# Patient Record
Sex: FEMALE | Race: BLACK OR AFRICAN AMERICAN | URBAN - METROPOLITAN AREA
[De-identification: names, ages, dates, MRNs, and addresses within clinical notes are randomized per-mention and may not be internally consistent; named-entity substitution may affect disease eponyms.]

---

## 2017-08-22 ENCOUNTER — ED HISTORICAL/CONVERTED ENCOUNTER (OUTPATIENT)
Dept: OTHER | Age: 57
End: 2017-08-22

## 2018-02-01 ENCOUNTER — ED HISTORICAL/CONVERTED ENCOUNTER (OUTPATIENT)
Dept: OTHER | Age: 58
End: 2018-02-01

## 2023-02-13 ENCOUNTER — APPOINTMENT (OUTPATIENT)
Dept: GENERAL RADIOLOGY | Age: 63
DRG: 137 | End: 2023-02-13
Attending: EMERGENCY MEDICINE
Payer: MEDICAID

## 2023-02-13 ENCOUNTER — APPOINTMENT (OUTPATIENT)
Dept: CT IMAGING | Age: 63
DRG: 137 | End: 2023-02-13
Attending: EMERGENCY MEDICINE
Payer: MEDICAID

## 2023-02-13 ENCOUNTER — HOSPITAL ENCOUNTER (INPATIENT)
Age: 63
LOS: 4 days | Discharge: HOME | DRG: 137 | End: 2023-02-17
Attending: EMERGENCY MEDICINE | Admitting: FAMILY MEDICINE
Payer: MEDICAID

## 2023-02-13 DIAGNOSIS — J96.01 ACUTE RESPIRATORY FAILURE WITH HYPOXIA (HCC): Primary | ICD-10-CM

## 2023-02-13 PROBLEM — J18.9 PNEUMONIA: Status: ACTIVE | Noted: 2023-02-13

## 2023-02-13 PROBLEM — J96.91 RESPIRATORY FAILURE WITH HYPOXIA (HCC): Status: ACTIVE | Noted: 2023-02-13

## 2023-02-13 LAB
ALBUMIN SERPL-MCNC: 3.3 G/DL (ref 3.5–5)
ALBUMIN/GLOB SERPL: 0.7 (ref 1.1–2.2)
ALP SERPL-CCNC: 68 U/L (ref 45–117)
ALT SERPL-CCNC: 38 U/L (ref 12–78)
ANION GAP SERPL CALC-SCNC: 9 MMOL/L (ref 5–15)
ARTERIAL PATENCY WRIST A: YES
ARTERIAL PATENCY WRIST A: YES
AST SERPL W P-5'-P-CCNC: 33 U/L (ref 15–37)
BASE EXCESS BLDA CALC-SCNC: 3.5 MMOL/L (ref 0–3)
BASE EXCESS BLDA CALC-SCNC: 4.9 MMOL/L (ref 0–3)
BASOPHILS # BLD: 0 K/UL (ref 0–0.1)
BASOPHILS NFR BLD: 1 % (ref 0–1)
BDY SITE: ABNORMAL
BDY SITE: ABNORMAL
BILIRUB SERPL-MCNC: 0.6 MG/DL (ref 0.2–1)
BODY TEMPERATURE: 98.3
BODY TEMPERATURE: 98.6
BUN SERPL-MCNC: 14 MG/DL (ref 6–20)
BUN/CREAT SERPL: 14 (ref 12–20)
CA-I BLD-MCNC: 9.2 MG/DL (ref 8.5–10.1)
CHLORIDE SERPL-SCNC: 89 MMOL/L (ref 97–108)
CO2 SERPL-SCNC: 28 MMOL/L (ref 21–32)
CREAT SERPL-MCNC: 0.97 MG/DL (ref 0.55–1.02)
DIFFERENTIAL METHOD BLD: ABNORMAL
EOSINOPHIL # BLD: 0 K/UL (ref 0–0.4)
EOSINOPHIL NFR BLD: 0 % (ref 0–7)
ERYTHROCYTE [DISTWIDTH] IN BLOOD BY AUTOMATED COUNT: 13.6 % (ref 11.5–14.5)
FIO2 ON VENT: 36 %
FLUAV AG NPH QL IA: NEGATIVE
FLUBV AG NOSE QL IA: NEGATIVE
GAS FLOW.O2 O2 DELIVERY SYS: 4 L/MIN
GAS FLOW.O2 O2 DELIVERY SYS: 4 L/MIN
GLOBULIN SER CALC-MCNC: 4.9 G/DL (ref 2–4)
GLUCOSE BLD STRIP.AUTO-MCNC: 195 MG/DL (ref 65–100)
GLUCOSE SERPL-MCNC: 209 MG/DL (ref 65–100)
HCO3 BLDA-SCNC: 27 MMOL/L (ref 22–26)
HCO3 BLDA-SCNC: 30 MMOL/L (ref 22–26)
HCT VFR BLD AUTO: 37.6 % (ref 35–47)
HGB BLD-MCNC: 12.5 G/DL (ref 11.5–16)
IMM GRANULOCYTES # BLD AUTO: 0 K/UL (ref 0–0.04)
IMM GRANULOCYTES NFR BLD AUTO: 1 % (ref 0–0.5)
LACTATE SERPL-SCNC: 1.2 MMOL/L (ref 0.4–2)
LYMPHOCYTES # BLD: 0.5 K/UL (ref 0.8–3.5)
LYMPHOCYTES NFR BLD: 18 % (ref 12–49)
MCH RBC QN AUTO: 28.8 PG (ref 26–34)
MCHC RBC AUTO-ENTMCNC: 33.2 G/DL (ref 30–36.5)
MCV RBC AUTO: 86.6 FL (ref 80–99)
MONOCYTES # BLD: 0.1 K/UL (ref 0–1)
MONOCYTES NFR BLD: 3 % (ref 5–13)
NEUTS SEG # BLD: 2.1 K/UL (ref 1.8–8)
NEUTS SEG NFR BLD: 77 % (ref 32–75)
NRBC # BLD: 0 K/UL (ref 0–0.01)
NRBC BLD-RTO: 0 PER 100 WBC
PCO2 BLDA: 39 MMHG (ref 35–45)
PCO2 BLDA: 46 MMHG (ref 35–45)
PERFORMED BY, TECHID: ABNORMAL
PH BLDA: 7.43 (ref 7.35–7.45)
PH BLDA: 7.47 (ref 7.35–7.45)
PLATELET # BLD AUTO: 196 K/UL (ref 150–400)
PMV BLD AUTO: 10.4 FL (ref 8.9–12.9)
PO2 BLDA: 54 MMHG (ref 80–100)
PO2 BLDA: 70 MMHG (ref 80–100)
POTASSIUM SERPL-SCNC: 3.4 MMOL/L (ref 3.5–5.1)
PROT SERPL-MCNC: 8.2 G/DL (ref 6.4–8.2)
RBC # BLD AUTO: 4.34 M/UL (ref 3.8–5.2)
SAO2% DEVICE SAO2% SENSOR NAME: ABNORMAL
SAO2% DEVICE SAO2% SENSOR NAME: ABNORMAL
SARS-COV-2 RDRP RESP QL NAA+PROBE: NOT DETECTED
SODIUM SERPL-SCNC: 126 MMOL/L (ref 136–145)
SPECIMEN SITE: ABNORMAL
SPECIMEN SITE: ABNORMAL
WBC # BLD AUTO: 2.6 K/UL (ref 3.6–11)

## 2023-02-13 PROCEDURE — 74011250636 HC RX REV CODE- 250/636: Performed by: FAMILY MEDICINE

## 2023-02-13 PROCEDURE — 80053 COMPREHEN METABOLIC PANEL: CPT

## 2023-02-13 PROCEDURE — 74011000258 HC RX REV CODE- 258: Performed by: FAMILY MEDICINE

## 2023-02-13 PROCEDURE — 83605 ASSAY OF LACTIC ACID: CPT

## 2023-02-13 PROCEDURE — 74011250636 HC RX REV CODE- 250/636: Performed by: EMERGENCY MEDICINE

## 2023-02-13 PROCEDURE — 74011250637 HC RX REV CODE- 250/637: Performed by: EMERGENCY MEDICINE

## 2023-02-13 PROCEDURE — 71275 CT ANGIOGRAPHY CHEST: CPT

## 2023-02-13 PROCEDURE — 74011250637 HC RX REV CODE- 250/637: Performed by: FAMILY MEDICINE

## 2023-02-13 PROCEDURE — 74011000636 HC RX REV CODE- 636: Performed by: FAMILY MEDICINE

## 2023-02-13 PROCEDURE — 87635 SARS-COV-2 COVID-19 AMP PRB: CPT

## 2023-02-13 PROCEDURE — 82962 GLUCOSE BLOOD TEST: CPT

## 2023-02-13 PROCEDURE — 99285 EMERGENCY DEPT VISIT HI MDM: CPT

## 2023-02-13 PROCEDURE — 36600 WITHDRAWAL OF ARTERIAL BLOOD: CPT

## 2023-02-13 PROCEDURE — 85025 COMPLETE CBC W/AUTO DIFF WBC: CPT

## 2023-02-13 PROCEDURE — 36415 COLL VENOUS BLD VENIPUNCTURE: CPT

## 2023-02-13 PROCEDURE — 71045 X-RAY EXAM CHEST 1 VIEW: CPT

## 2023-02-13 PROCEDURE — 96374 THER/PROPH/DIAG INJ IV PUSH: CPT

## 2023-02-13 PROCEDURE — 87804 INFLUENZA ASSAY W/OPTIC: CPT

## 2023-02-13 PROCEDURE — 87040 BLOOD CULTURE FOR BACTERIA: CPT

## 2023-02-13 PROCEDURE — 81003 URINALYSIS AUTO W/O SCOPE: CPT

## 2023-02-13 PROCEDURE — 82803 BLOOD GASES ANY COMBINATION: CPT

## 2023-02-13 PROCEDURE — 74011000250 HC RX REV CODE- 250: Performed by: EMERGENCY MEDICINE

## 2023-02-13 PROCEDURE — 83036 HEMOGLOBIN GLYCOSYLATED A1C: CPT

## 2023-02-13 PROCEDURE — 65270000029 HC RM PRIVATE

## 2023-02-13 RX ORDER — INSULIN LISPRO 100 [IU]/ML
INJECTION, SOLUTION INTRAVENOUS; SUBCUTANEOUS
Status: DISCONTINUED | OUTPATIENT
Start: 2023-02-13 | End: 2023-02-17 | Stop reason: HOSPADM

## 2023-02-13 RX ORDER — ENOXAPARIN SODIUM 100 MG/ML
40 INJECTION SUBCUTANEOUS DAILY
Status: DISCONTINUED | OUTPATIENT
Start: 2023-02-14 | End: 2023-02-17 | Stop reason: HOSPADM

## 2023-02-13 RX ORDER — POTASSIUM CHLORIDE 20 MEQ/1
40 TABLET, EXTENDED RELEASE ORAL
Status: COMPLETED | OUTPATIENT
Start: 2023-02-13 | End: 2023-02-13

## 2023-02-13 RX ORDER — LEVOFLOXACIN 5 MG/ML
750 INJECTION, SOLUTION INTRAVENOUS EVERY 24 HOURS
Status: DISCONTINUED | OUTPATIENT
Start: 2023-02-13 | End: 2023-02-17 | Stop reason: HOSPADM

## 2023-02-13 RX ORDER — ONDANSETRON 4 MG/1
4 TABLET, ORALLY DISINTEGRATING ORAL
Status: DISCONTINUED | OUTPATIENT
Start: 2023-02-13 | End: 2023-02-17 | Stop reason: HOSPADM

## 2023-02-13 RX ORDER — SODIUM CHLORIDE 0.9 % (FLUSH) 0.9 %
5-10 SYRINGE (ML) INJECTION AS NEEDED
Status: DISCONTINUED | OUTPATIENT
Start: 2023-02-13 | End: 2023-02-17 | Stop reason: HOSPADM

## 2023-02-13 RX ORDER — ACETAMINOPHEN 500 MG
1000 TABLET ORAL
Status: COMPLETED | OUTPATIENT
Start: 2023-02-13 | End: 2023-02-13

## 2023-02-13 RX ORDER — POLYETHYLENE GLYCOL 3350 17 G/17G
17 POWDER, FOR SOLUTION ORAL DAILY PRN
Status: DISCONTINUED | OUTPATIENT
Start: 2023-02-13 | End: 2023-02-17 | Stop reason: HOSPADM

## 2023-02-13 RX ORDER — IPRATROPIUM BROMIDE AND ALBUTEROL SULFATE 2.5; .5 MG/3ML; MG/3ML
3 SOLUTION RESPIRATORY (INHALATION)
Status: COMPLETED | OUTPATIENT
Start: 2023-02-13 | End: 2023-02-13

## 2023-02-13 RX ORDER — IBUPROFEN 200 MG
4 TABLET ORAL AS NEEDED
Status: DISCONTINUED | OUTPATIENT
Start: 2023-02-13 | End: 2023-02-17 | Stop reason: HOSPADM

## 2023-02-13 RX ORDER — ACETAMINOPHEN 650 MG/1
650 SUPPOSITORY RECTAL
Status: DISCONTINUED | OUTPATIENT
Start: 2023-02-13 | End: 2023-02-17 | Stop reason: HOSPADM

## 2023-02-13 RX ORDER — BUDESONIDE AND FORMOTEROL FUMARATE DIHYDRATE 160; 4.5 UG/1; UG/1
2 AEROSOL RESPIRATORY (INHALATION) 2 TIMES DAILY
Status: DISCONTINUED | OUTPATIENT
Start: 2023-02-13 | End: 2023-02-17 | Stop reason: HOSPADM

## 2023-02-13 RX ORDER — ACETAMINOPHEN 325 MG/1
650 TABLET ORAL
Status: DISCONTINUED | OUTPATIENT
Start: 2023-02-13 | End: 2023-02-17 | Stop reason: HOSPADM

## 2023-02-13 RX ORDER — ONDANSETRON 2 MG/ML
4 INJECTION INTRAMUSCULAR; INTRAVENOUS
Status: DISCONTINUED | OUTPATIENT
Start: 2023-02-13 | End: 2023-02-17 | Stop reason: HOSPADM

## 2023-02-13 RX ORDER — IPRATROPIUM BROMIDE AND ALBUTEROL SULFATE 2.5; .5 MG/3ML; MG/3ML
3 SOLUTION RESPIRATORY (INHALATION)
Status: DISCONTINUED | OUTPATIENT
Start: 2023-02-13 | End: 2023-02-17

## 2023-02-13 RX ORDER — SODIUM CHLORIDE 9 MG/ML
75 INJECTION, SOLUTION INTRAVENOUS CONTINUOUS
Status: DISCONTINUED | OUTPATIENT
Start: 2023-02-13 | End: 2023-02-16

## 2023-02-13 RX ADMIN — LEVOFLOXACIN 750 MG: 5 INJECTION, SOLUTION INTRAVENOUS at 18:35

## 2023-02-13 RX ADMIN — ACETAMINOPHEN 1000 MG: 500 TABLET ORAL at 18:35

## 2023-02-13 RX ADMIN — IOPAMIDOL 100 ML: 755 INJECTION, SOLUTION INTRAVENOUS at 19:35

## 2023-02-13 RX ADMIN — IPRATROPIUM BROMIDE AND ALBUTEROL SULFATE 3 ML: 2.5; .5 SOLUTION RESPIRATORY (INHALATION) at 18:03

## 2023-02-13 RX ADMIN — SODIUM CHLORIDE 75 ML/HR: 9 INJECTION, SOLUTION INTRAVENOUS at 22:38

## 2023-02-13 RX ADMIN — PIPERACILLIN AND TAZOBACTAM 4.5 G: 4; .5 INJECTION, POWDER, FOR SOLUTION INTRAVENOUS at 22:35

## 2023-02-13 RX ADMIN — POTASSIUM CHLORIDE 40 MEQ: 1500 TABLET, EXTENDED RELEASE ORAL at 22:43

## 2023-02-13 NOTE — ED TRIAGE NOTES
C/o SOB, diarrhea, decreased appetite x 4 days  Hx lung Ca  Bg 224    75% SpO2 on room air- duoneb given by EMS.  Now up to 94% on room air

## 2023-02-13 NOTE — Clinical Note
Status[de-identified] INPATIENT [101]   Type of Bed: Remote Telemetry [29]   Cardiac Monitoring Required?: Yes   Inpatient Hospitalization Certified Necessary for the Following Reasons: 9.  Other (further clarification in H&P documentation)   Admitting Diagnosis: Respiratory failure with hypoxia [facility]) [[id number]]   Admitting Physician: Yfn Garcia [[id number]]   Attending Physician: Yfn Garcia [[id number]]   Estimated Length of Stay: 2 Midnights   Discharge Plan[de-identified] Home with Office Follow-up

## 2023-02-14 LAB
ALBUMIN SERPL-MCNC: 2.8 G/DL (ref 3.5–5)
ALBUMIN/GLOB SERPL: 0.6 (ref 1.1–2.2)
ALP SERPL-CCNC: 59 U/L (ref 45–117)
ALT SERPL-CCNC: 51 U/L (ref 12–78)
ANION GAP SERPL CALC-SCNC: 4 MMOL/L (ref 5–15)
APPEARANCE UR: CLEAR
AST SERPL W P-5'-P-CCNC: 55 U/L (ref 15–37)
BILIRUB SERPL-MCNC: 0.4 MG/DL (ref 0.2–1)
BILIRUB UR QL: NEGATIVE
BUN SERPL-MCNC: 9 MG/DL (ref 6–20)
BUN/CREAT SERPL: 10 (ref 12–20)
CA-I BLD-MCNC: 8.6 MG/DL (ref 8.5–10.1)
CHLORIDE SERPL-SCNC: 100 MMOL/L (ref 97–108)
CO2 SERPL-SCNC: 29 MMOL/L (ref 21–32)
COLOR UR: ABNORMAL
CREAT SERPL-MCNC: 0.93 MG/DL (ref 0.55–1.02)
EST. AVERAGE GLUCOSE BLD GHB EST-MCNC: 186 MG/DL
GLOBULIN SER CALC-MCNC: 4.9 G/DL (ref 2–4)
GLUCOSE BLD STRIP.AUTO-MCNC: 109 MG/DL (ref 65–100)
GLUCOSE BLD STRIP.AUTO-MCNC: 220 MG/DL (ref 65–100)
GLUCOSE BLD STRIP.AUTO-MCNC: 244 MG/DL (ref 65–100)
GLUCOSE BLD STRIP.AUTO-MCNC: 298 MG/DL (ref 65–100)
GLUCOSE SERPL-MCNC: 211 MG/DL (ref 65–100)
GLUCOSE UR STRIP.AUTO-MCNC: >1000 MG/DL
HBA1C MFR BLD: 8.1 % (ref 4–5.6)
HGB UR QL STRIP: NEGATIVE
KETONES UR QL STRIP.AUTO: NEGATIVE MG/DL
LACTATE SERPL-SCNC: 1.1 MMOL/L (ref 0.4–2)
LEUKOCYTE ESTERASE UR QL STRIP.AUTO: NEGATIVE
NITRITE UR QL STRIP.AUTO: NEGATIVE
PERFORMED BY, TECHID: ABNORMAL
PH UR STRIP: 5
POTASSIUM SERPL-SCNC: 3.4 MMOL/L (ref 3.5–5.1)
PROT SERPL-MCNC: 7.7 G/DL (ref 6.4–8.2)
PROT UR STRIP-MCNC: NEGATIVE MG/DL
SODIUM SERPL-SCNC: 133 MMOL/L (ref 136–145)
SP GR UR REFRACTOMETRY: 1.02 (ref 1–1.03)
UROBILINOGEN UR QL STRIP.AUTO: 0.1 EU/DL (ref 0.2–1)

## 2023-02-14 PROCEDURE — 74011250636 HC RX REV CODE- 250/636: Performed by: FAMILY MEDICINE

## 2023-02-14 PROCEDURE — 36415 COLL VENOUS BLD VENIPUNCTURE: CPT

## 2023-02-14 PROCEDURE — 65270000029 HC RM PRIVATE

## 2023-02-14 PROCEDURE — 74011000258 HC RX REV CODE- 258: Performed by: FAMILY MEDICINE

## 2023-02-14 PROCEDURE — 74011636637 HC RX REV CODE- 636/637: Performed by: FAMILY MEDICINE

## 2023-02-14 PROCEDURE — 94640 AIRWAY INHALATION TREATMENT: CPT

## 2023-02-14 PROCEDURE — 74011000250 HC RX REV CODE- 250: Performed by: FAMILY MEDICINE

## 2023-02-14 PROCEDURE — 83605 ASSAY OF LACTIC ACID: CPT

## 2023-02-14 PROCEDURE — 77010033678 HC OXYGEN DAILY

## 2023-02-14 PROCEDURE — 94761 N-INVAS EAR/PLS OXIMETRY MLT: CPT

## 2023-02-14 PROCEDURE — 74011250636 HC RX REV CODE- 250/636: Performed by: INTERNAL MEDICINE

## 2023-02-14 PROCEDURE — 80053 COMPREHEN METABOLIC PANEL: CPT

## 2023-02-14 PROCEDURE — 74011250637 HC RX REV CODE- 250/637: Performed by: FAMILY MEDICINE

## 2023-02-14 PROCEDURE — 82962 GLUCOSE BLOOD TEST: CPT

## 2023-02-14 RX ORDER — POTASSIUM CHLORIDE 750 MG/1
40 TABLET, FILM COATED, EXTENDED RELEASE ORAL
Status: COMPLETED | OUTPATIENT
Start: 2023-02-14 | End: 2023-02-14

## 2023-02-14 RX ADMIN — ENOXAPARIN SODIUM 40 MG: 100 INJECTION SUBCUTANEOUS at 08:18

## 2023-02-14 RX ADMIN — PIPERACILLIN AND TAZOBACTAM 3.38 G: 3; .375 INJECTION, POWDER, FOR SOLUTION INTRAVENOUS at 12:39

## 2023-02-14 RX ADMIN — LEVOFLOXACIN 750 MG: 5 INJECTION, SOLUTION INTRAVENOUS at 17:21

## 2023-02-14 RX ADMIN — INSULIN LISPRO 4 UNITS: 100 INJECTION, SOLUTION INTRAVENOUS; SUBCUTANEOUS at 17:21

## 2023-02-14 RX ADMIN — IPRATROPIUM BROMIDE AND ALBUTEROL SULFATE 3 ML: 2.5; .5 SOLUTION RESPIRATORY (INHALATION) at 02:12

## 2023-02-14 RX ADMIN — METHYLPREDNISOLONE SODIUM SUCCINATE 40 MG: 40 INJECTION, POWDER, FOR SOLUTION INTRAMUSCULAR; INTRAVENOUS at 20:47

## 2023-02-14 RX ADMIN — PIPERACILLIN AND TAZOBACTAM 3.38 G: 3; .375 INJECTION, POWDER, FOR SOLUTION INTRAVENOUS at 04:48

## 2023-02-14 RX ADMIN — ACETAMINOPHEN 650 MG: 325 TABLET ORAL at 12:39

## 2023-02-14 RX ADMIN — BUDESONIDE AND FORMOTEROL FUMARATE DIHYDRATE 2 PUFF: 160; 4.5 AEROSOL RESPIRATORY (INHALATION) at 09:23

## 2023-02-14 RX ADMIN — ACETAMINOPHEN 650 MG: 325 TABLET ORAL at 23:53

## 2023-02-14 RX ADMIN — BUDESONIDE AND FORMOTEROL FUMARATE DIHYDRATE 2 PUFF: 160; 4.5 AEROSOL RESPIRATORY (INHALATION) at 20:11

## 2023-02-14 RX ADMIN — SODIUM CHLORIDE 75 ML/HR: 9 INJECTION, SOLUTION INTRAVENOUS at 12:40

## 2023-02-14 RX ADMIN — IPRATROPIUM BROMIDE AND ALBUTEROL SULFATE 3 ML: 2.5; .5 SOLUTION RESPIRATORY (INHALATION) at 09:23

## 2023-02-14 RX ADMIN — METHYLPREDNISOLONE SODIUM SUCCINATE 40 MG: 40 INJECTION, POWDER, FOR SOLUTION INTRAMUSCULAR; INTRAVENOUS at 14:35

## 2023-02-14 RX ADMIN — INSULIN LISPRO 4 UNITS: 100 INJECTION, SOLUTION INTRAVENOUS; SUBCUTANEOUS at 08:18

## 2023-02-14 RX ADMIN — POTASSIUM CHLORIDE 40 MEQ: 750 TABLET, FILM COATED, EXTENDED RELEASE ORAL at 14:38

## 2023-02-14 RX ADMIN — IPRATROPIUM BROMIDE AND ALBUTEROL SULFATE 3 ML: 2.5; .5 SOLUTION RESPIRATORY (INHALATION) at 14:48

## 2023-02-14 RX ADMIN — IPRATROPIUM BROMIDE AND ALBUTEROL SULFATE 3 ML: 2.5; .5 SOLUTION RESPIRATORY (INHALATION) at 20:11

## 2023-02-14 RX ADMIN — PIPERACILLIN AND TAZOBACTAM 3.38 G: 3; .375 INJECTION, POWDER, FOR SOLUTION INTRAVENOUS at 20:47

## 2023-02-14 RX ADMIN — ACETAMINOPHEN 650 MG: 325 TABLET ORAL at 04:39

## 2023-02-14 RX ADMIN — INSULIN LISPRO 4 UNITS: 100 INJECTION, SOLUTION INTRAVENOUS; SUBCUTANEOUS at 20:46

## 2023-02-14 NOTE — H&P
History and Physical    NAME: Cheryl Puri   :  1960   MRN:  [id number]     Date/Time:  2023 9:21 PM    Patient PCP: Shonna Nick MD  ______________________________________________________________________             Subjective:     CHIEF COMPLAINT:     Shortness of breath    HISTORY OF PRESENT ILLNESS:       Patient is a 60y.o. year old female signal past medical history of type 2 diabetes hypertension history of lung cancer status post chemoradiation patient on as needed home oxygen on 4 L came to emergency room complaining of shortness of breath decreased appetite for last 4 days denies any fever no chills no chest pain  Came to emergency room with temperature of 102.1 patient oxygen saturation was 75% initially DuoNeb was given by the EMS saturation improved to 94%CT scan of the chest done shows perihilar basilar opacity suggestive of airspace disease    No past medical history on file. No past surgical history on file. Social History     Tobacco Use    Smoking status: Not on file    Smokeless tobacco: Not on file   Substance Use Topics    Alcohol use: Not on file        No family history on file.     Allergies   Allergen Reactions    Lisinopril Unknown (comments)        Prior to Admission medications    Not on File         Current Facility-Administered Medications:     sodium chloride (NS) flush 5-10 mL, 5-10 mL, IntraVENous, PRN, Suzi Benavidez MD    levoFLOXacin (LEVAQUIN) 750 mg in D5W IVPB, 750 mg, IntraVENous, Q24H, Suzi Benavidez MD, IV Completed at 23    insulin lispro (HUMALOG) injection, , SubCUTAneous, AC&HS, Suzi Benavidez MD    glucose chewable tablet 16 g, 4 Tablet, Oral, PRN, Suzi Benavidez MD    glucagon (GLUCAGEN) injection 1 mg, 1 mg, IntraMUSCular, PRN, Radha Rollins MD    0.9% sodium chloride infusion, 75 mL/hr, IntraVENous, CONTINUOUS, Suzi Benavidez MD    acetaminophen (TYLENOL) tablet 650 mg, 650 mg, Oral, Q6H PRN **OR** acetaminophen (TYLENOL) suppository 650 mg, 650 mg, Rectal, Q6H PRN, Suzi Benavidez MD    polyethylene glycol (MIRALAX) packet 17 g, 17 g, Oral, DAILY PRN, Suzi Benavidez MD    ondansetron (ZOFRAN ODT) tablet 4 mg, 4 mg, Oral, Q8H PRN **OR** ondansetron (ZOFRAN) injection 4 mg, 4 mg, IntraVENous, Q6H PRN, Suzi Benavidez MD    [START ON 2/14/2023] enoxaparin (LOVENOX) injection 40 mg, 40 mg, SubCUTAneous, DAILY, Suzi Benavidez MD    piperacillin-tazobactam (ZOSYN) 3.375 g in 0.9% sodium chloride (MBP/ADV) 100 mL MBP, 3.375 g, IntraVENous, Q8H, Suzi Benavidez MD    albuterol-ipratropium (DUO-NEB) 2.5 MG-0.5 MG/3 ML, 3 mL, Nebulization, Q6H RT, Suzi Benavidez MD    budesonide-formoteroL (SYMBICORT) 160-4.5 mcg/actuation HFA inhaler 2 Puff, 2 Puff, Inhalation, BID, Suzi Benavidez MD    potassium chloride SR (KLOR-CON 10) tablet 40 mEq, 40 mEq, Oral, NOW, Demetrio Benavidez MD  No current outpatient medications on file. LAB DATA REVIEWED:    Recent Results (from the past 24 hour(s))   LACTIC ACID    Collection Time: 02/13/23  4:53 PM   Result Value Ref Range    Lactic acid 1.2 0.4 - 2.0 mmol/L   METABOLIC PANEL, COMPREHENSIVE    Collection Time: 02/13/23  4:53 PM   Result Value Ref Range    Sodium 126 (L) 136 - 145 mmol/L    Potassium 3.4 (L) 3.5 - 5.1 mmol/L    Chloride 89 (L) 97 - 108 mmol/L    CO2 28 21 - 32 mmol/L    Anion gap 9 5 - 15 mmol/L    Glucose 209 (H) 65 - 100 mg/dL    BUN 14 6 - 20 mg/dL    Creatinine 0.97 0.55 - 1.02 mg/dL    BUN/Creatinine ratio 14 12 - 20      eGFR >60 >60 ml/min/1.73m2    Calcium 9.2 8.5 - 10.1 mg/dL    Bilirubin, total 0.6 0.2 - 1.0 mg/dL    AST (SGOT) 33 15 - 37 U/L    ALT (SGPT) 38 12 - 78 U/L    Alk.  phosphatase 68 45 - 117 U/L    Protein, total 8.2 6.4 - 8.2 g/dL    Albumin 3.3 (L) 3.5 - 5.0 g/dL    Globulin 4.9 (H) 2.0 - 4.0 g/dL    A-G Ratio 0.7 (L) 1.1 - 2.2     CBC WITH AUTOMATED DIFF    Collection Time: 02/13/23  4:53 PM   Result Value Ref Range    WBC 2.6 (L) 3.6 - 11.0 K/uL    RBC 4.34 3.80 - 5.20 M/uL    HGB 12.5 11.5 - 16.0 g/dL    HCT 37.6 35.0 - 47.0 %    MCV 86.6 80.0 - 99.0 FL    MCH 28.8 26.0 - 34.0 PG    MCHC 33.2 30.0 - 36.5 g/dL    RDW 13.6 11.5 - 14.5 %    PLATELET 340 303 - 137 K/uL    MPV 10.4 8.9 - 12.9 FL    NRBC 0.0 0.0  WBC    ABSOLUTE NRBC 0.00 0.00 - 0.01 K/uL    NEUTROPHILS 77 (H) 32 - 75 %    LYMPHOCYTES 18 12 - 49 %    MONOCYTES 3 (L) 5 - 13 %    EOSINOPHILS 0 0 - 7 %    BASOPHILS 1 0 - 1 %    IMMATURE GRANULOCYTES 1 (H) 0 - 0.5 %    ABS. NEUTROPHILS 2.1 1.8 - 8.0 K/UL    ABS. LYMPHOCYTES 0.5 (L) 0.8 - 3.5 K/UL    ABS. MONOCYTES 0.1 0.0 - 1.0 K/UL    ABS. EOSINOPHILS 0.0 0.0 - 0.4 K/UL    ABS. BASOPHILS 0.0 0.0 - 0.1 K/UL    ABS. IMM. GRANS. 0.0 0.00 - 0.04 K/UL    DF AUTOMATED     COVID-19 RAPID TEST    Collection Time: 02/13/23  4:53 PM   Result Value Ref Range    COVID-19 rapid test Not Detected Not Detected     INFLUENZA A & B AG (RAPID TEST)    Collection Time: 02/13/23  4:53 PM   Result Value Ref Range    Influenza A Antigen Negative Negative      Influenza B Antigen Negative Negative     BLOOD GAS, ARTERIAL    Collection Time: 02/13/23  6:36 PM   Result Value Ref Range    pH 7.47 (H) 7.35 - 7.45      PCO2 39 35 - 45 mmHg    PO2 70 (L) 80 - 100 mmHg    BICARBONATE 27 (H) 22 - 26 mmol/L    BASE EXCESS 3.5 (H) 0 - 3 mmol/L    O2 METHOD Nasal Cannula      O2 FLOW RATE 4.00 L/min    FIO2 36.0 %    Sample source Arterial      SITE Right Radial      JUANY'S TEST YES      Carboxy-Hgb PENDING %    Oxyhemoglobin PENDING %    Performed by Melva Ill     TEMPERATURE 98.6         XR Results (most recent):  Results from Hospital Encounter encounter on 02/13/23    XR CHEST PORT    Narrative  INDICATION: . Sepsis  Additional history:  COMPARISON: None. LIMITATIONS: Portable technique. Dinah Loco   FINDINGS: Single frontal view of the chest.  .  Lines/tubes/surgical: There is a port in the right chest with a subclavian  approach catheter that projects to terminate in the mid SVC. Cardiac monitor  leads overly the patient  Heart/mediastinum: Calcifications in the aortic arch. .  Lungs/pleura: Hazy opacity over the midlungs and bases with partially obscured  hemidiaphragms. Minimal linear opacities in both bases suggesting  scarring/atelectasis. No visible pneumothorax. Additional Comments: None. .    Impression  1. Bilateral pleural effusions with associated passive atelectasis and/or  consolidation. 2. Bibasilar opacities most suggestive of scarring/atelectasis. Developing  airspace disease cannot be excluded         CTA CHEST W OR W WO CONT   Final Result   Respiratory motion limits evaluation   1. No visualized pulmonary embolus. 2. Perihilar and bibasilar opacity suggesting airspace disease. 3. Left basilar mass. 4. Incidental findings as above      XR CHEST PORT   Final Result   1. Bilateral pleural effusions with associated passive atelectasis and/or   consolidation. 2. Bibasilar opacities most suggestive of scarring/atelectasis. Developing   airspace disease cannot be excluded                  Review of Systems:  Constitutional: Negative for chills and fever. HENT: Negative. Eyes: Negative. Respiratory: Shortness of breath  Cardiovascular: Negative. Gastrointestinal: Negative for abdominal pain and nausea. Skin: Negative. Neurological: Negative. Objective:   VITALS:    Visit Vitals  BP (!) 135/49   Pulse 83   Temp 98.3 °F (36.8 °C)   Resp (!) 34   Ht 5' 6\" (1.676 m)   Wt 93.4 kg (206 lb)   SpO2 93%   BMI 33.25 kg/m²       Physical Exam:   Constitutional: pt is oriented to person, place, and time. HENT:   Head: Normocephalic and atraumatic. Eyes: Pupils are equal, round, and reactive to light. EOM are normal.   Cardiovascular: Normal rate, regular rhythm and normal heart sounds. Pulmonary/Chest: Breath sounds normal. No wheezes. No rales. Exhibits no tenderness. Abdominal: Soft.  Bowel sounds are normal. There is no abdominal tenderness. There is no rebound and no guarding. Musculoskeletal: Normal range of motion. Neurological: pt is alert and oriented to person, place, and time. Alert. Normal strength. No cranial nerve deficit or sensory deficit. Displays a negative Romberg sign.         ASSESSMENT & PLAN:  Acute hypoxic respiratory failure  Gram-negative pneumonia  History of lung cancer  Type 2 diabetes  Hypertension  Hyponatremia  Hypokalemia        Current Facility-Administered Medications:     sodium chloride (NS) flush 5-10 mL, 5-10 mL, IntraVENous, PRN, Dimas Benavidez MD    levoFLOXacin (LEVAQUIN) 750 mg in D5W IVPB, 750 mg, IntraVENous, Q24H, Suzi Benavidez MD, IV Completed at 02/13/23 2005    insulin lispro (HUMALOG) injection, , SubCUTAneous, AC&HS, Dimas Benavidez MD    glucose chewable tablet 16 g, 4 Tablet, Oral, PRN, Dimas Benavidez MD    glucagon (GLUCAGEN) injection 1 mg, 1 mg, IntraMUSCular, PRN, Maddison Blackburn MD    0.9% sodium chloride infusion, 75 mL/hr, IntraVENous, CONTINUOUS, Dimas Benavidez MD    acetaminophen (TYLENOL) tablet 650 mg, 650 mg, Oral, Q6H PRN **OR** acetaminophen (TYLENOL) suppository 650 mg, 650 mg, Rectal, Q6H PRN, Suzi Benavidez MD    polyethylene glycol (MIRALAX) packet 17 g, 17 g, Oral, DAILY PRN, Suzi Benavidez MD    ondansetron (ZOFRAN ODT) tablet 4 mg, 4 mg, Oral, Q8H PRN **OR** ondansetron (ZOFRAN) injection 4 mg, 4 mg, IntraVENous, Q6H PRN, Suzi Benavidez MD    [START ON 2/14/2023] enoxaparin (LOVENOX) injection 40 mg, 40 mg, SubCUTAneous, DAILY, Suzi Benavidez MD    piperacillin-tazobactam (ZOSYN) 3.375 g in 0.9% sodium chloride (MBP/ADV) 100 mL MBP, 3.375 g, IntraVENous, Q8H, Suzi Benavidez MD    albuterol-ipratropium (DUO-NEB) 2.5 MG-0.5 MG/3 ML, 3 mL, Nebulization, Q6H RT, Dimas Benavidez MD    budesonide-formoteroL (SYMBICORT) 160-4.5 mcg/actuation HFA inhaler 2 Puff, 2 Puff, Inhalation, BID, Dimas Benavidez MD    potassium chloride SR (KLOR-CON 10) tablet 40 mEq, 40 mEq, Oral, NOW, Emory Benavidez MD  No current outpatient medications on file.     ________________________________________________________________________    Signed: Micheal Fuentes MD

## 2023-02-14 NOTE — PROGRESS NOTES
Reason for Admission:  diarrhea and dizziness                     RUR Score:          5%           Plan for utilizing home health:      n/a    PCP: First and Last name:  [address]   Name of Practice:    Are you a current patient: Yes/No: 2 months ago   Approximate date of last visit:    Can you participate in a virtual visit with your PCP:                     Current Advanced Directive/Advance Care Plan: Full Code      Healthcare Decision Maker:   Click here to complete [address] including selection of the Healthcare Decision Maker Relationship (ie \"Primary\")         DaughterTino [phone]                    Transition of Care Plan:                    Patient lives at home with her daughter in a 3 story home, but patient states she primarily stays on the first and second floor and the third level is dedicated to her grandson. Patient states she has been so dizzy that she feels she needs something to assist her in getting around the home. Patient reports she has oxygen at home at 2 LPM and adjusts as needed. Verified demographic info with patient. Patient reports her daughter will transport her home at discharge. Patient uses the [facility] on [address] For her scripts. [facility]  Cardiology  [address] [place] [id number]  Phone: [phone]  Fax:   Follow Up Time:

## 2023-02-14 NOTE — CONSULTS
PULMONARY CONSULT  [facility]    Name: Corrine Asif MRN: [id number]   : 1960 Hospital: [facility]   Date: 2023  Admission date: 2023 Hospital Day: 2       HPI:     Hospital Problems  Never Reviewed            Codes Class Noted POA    Respiratory failure with hypoxia [facility]) ICD-10-CM: J96.91  ICD-9-CM: 518.81  2023 Unknown        Pneumonia ICD-10-CM: J18.9  ICD-9-CM: 597  2023 Unknown                [x] High complexity decision making was performed  [x] See my orders for details      Subjective/Initial History:     I was asked by Marjorie Pierce MD to see Corrine Asif  a 60 y.o.    female in consultation     Excerpts from admission 2023 or consult notes as follows:   58-year-old lady came to hospital because of shortness of breath and dyspnea, she was seen at the oncology office recommend to come to the hospital she is chronically on home oxygen history of lung cancer status post chemoradiation treatment she was short of breath came to the hospital now she is on 4 L nasal cannula she was also febrile temperature of 102 CAT scan of the chest was done which shows left basilar mass and perihilar infiltrate possible atelectasis she is weak and tired so admitted and pulmonary consult was called, she is complaining about diarrhea      Allergies   Allergen Reactions    Lisinopril Unknown (comments)        MAR reviewed and pertinent medications noted or modified as needed     Current Facility-Administered Medications   Medication    sodium chloride (NS) flush 5-10 mL    levoFLOXacin (LEVAQUIN) 750 mg in D5W IVPB    insulin lispro (HUMALOG) injection    glucose chewable tablet 16 g    glucagon (GLUCAGEN) injection 1 mg    0.9% sodium chloride infusion    acetaminophen (TYLENOL) tablet 650 mg    Or    acetaminophen (TYLENOL) suppository 650 mg    polyethylene glycol (MIRALAX) packet 17 g    ondansetron (ZOFRAN ODT) tablet 4 mg    Or    ondansetron [facility]) injection 4 mg    enoxaparin (LOVENOX) injection 40 mg    albuterol-ipratropium (DUO-NEB) 2.5 MG-0.5 MG/3 ML    budesonide-formoteroL (SYMBICORT) 160-4.5 mcg/actuation HFA inhaler 2 Puff    piperacillin-tazobactam (ZOSYN) 3.375 g in 0.9% sodium chloride (MBP/ADV) 100 mL MBP      Patient PCP: Shonna Nick MD  PMH:  has no past medical history on file. PSH:   has no past surgical history on file. FHX: family history is not on file. SHX:       ROS:    Review of system as per HPI and physical exam    Objective:     Vital Signs: Telemetry:    normal sinus rhythm Intake/Output:   Visit Vitals  BP (!) 119/56 (BP 1 Location: Right upper arm, BP Patient Position: Semi fowlers)   Pulse (!) 102   Temp 99.1 °F (37.3 °C)   Resp 18   Ht 5' 6\" (1.676 m)   Wt 93.4 kg (206 lb)   SpO2 94%   BMI 33.25 kg/m²       Temp (24hrs), Av °F (37.8 °C), Min:98.3 °F (36.8 °C), Max:102.5 °F (39.2 °C)        O2 Device: Nasal cannula O2 Flow Rate (L/min): 2 l/min       Wt Readings from Last 4 Encounters:   23 93.4 kg (206 lb)          Intake/Output Summary (Last 24 hours) at 2023 1013  Last data filed at 2023 2353  Gross per 24 hour   Intake 150 ml   Output 750 ml   Net -600 ml       Last shift:      No intake/output data recorded. Last 3 shifts:  1901 -  0700  In: 150 [I.V.:150]  Out: 750 [Urine:750]       Physical Exam:     Physical Exam  Constitutional:       Appearance: She is obese. HENT:      Head: Normocephalic and atraumatic. Nose: Nose normal.      Mouth/Throat:      Mouth: Mucous membranes are moist.   Cardiovascular:      Rate and Rhythm: Normal rate and regular rhythm. Pulses: Normal pulses. Heart sounds: Normal heart sounds. Pulmonary:      Effort: Pulmonary effort is normal.      Breath sounds: Rhonchi and rales present. Abdominal:      General: Abdomen is flat. Bowel sounds are normal.      Palpations: Abdomen is soft.    Musculoskeletal:         General: Normal range of motion. Cervical back: Normal range of motion and neck supple. Skin:     General: Skin is warm. Neurological:      General: No focal deficit present. Mental Status: She is alert. Labs:    Recent Labs     02/13/23  1653   WBC 2.6*   HGB 12.5        Recent Labs     02/14/23  0905 02/13/23  1653   * 126*   K 3.4* 3.4*    89*   CO2 29 28   * 209*   BUN 9 14   CREA 0.93 0.97   CA 8.6 9.2   LAC 1.1 1.2   ALB 2.8* 3.3*   ALT 51 38     Recent Labs     02/13/23  2238 02/13/23  1836   PH 7.43 7.47*   PCO2 46* 39   PO2 54* 70*   HCO3 30* 27*   FIO2  --  36.0     No results for input(s): CPK, CKNDX, TROIQ in the last 72 hours. No lab exists for component: CPKMB  No results found for: BNPP, BNP   No results found for: CULTNo results found for: TSH, TSHEXT    Imaging:    CXR Results  (Last 48 hours)                 02/13/23 1718  XR CHEST PORT Final result    Impression:  1. Bilateral pleural effusions with associated passive atelectasis and/or   consolidation. 2. Bibasilar opacities most suggestive of scarring/atelectasis. Developing   airspace disease cannot be excluded               Narrative:  INDICATION: . Sepsis   Additional history:   COMPARISON: None. LIMITATIONS: Portable technique. Srikanth Cast FINDINGS: Single frontal view of the chest.    .   Lines/tubes/surgical: There is a port in the right chest with a subclavian   approach catheter that projects to terminate in the mid SVC. Cardiac monitor   leads overly the patient   Heart/mediastinum: Calcifications in the aortic arch. .    Lungs/pleura: Hazy opacity over the midlungs and bases with partially obscured   hemidiaphragms. Minimal linear opacities in both bases suggesting   scarring/atelectasis. No visible pneumothorax. Additional Comments: None.     .             Results from [place] encounter on 02/13/23    XR CHEST PORT    Narrative  INDICATION: . Sepsis  Additional history:  COMPARISON: None.  LIMITATIONS: Portable technique. [facility] FINDINGS: Single frontal view of the chest.  .  Lines/tubes/surgical: There is a port in the right chest with a subclavian  approach catheter that projects to terminate in the mid SVC. Cardiac monitor  leads overly the patient  Heart/mediastinum: Calcifications in the aortic arch. .  Lungs/pleura: Hazy opacity over the midlungs and bases with partially obscured  hemidiaphragms. Minimal linear opacities in both bases suggesting  scarring/atelectasis. No visible pneumothorax. Additional Comments: None. .    Impression  1. Bilateral pleural effusions with associated passive atelectasis and/or  consolidation. 2. Bibasilar opacities most suggestive of scarring/atelectasis. Developing  airspace disease cannot be excluded    Results from Hospital Encounter encounter on 02/13/23    CTA CHEST W OR W WO CONT    Narrative  EXAM:  CTA CHEST W OR W WO CONT  INDICATION:  hypoxia, hx Cancer. Additional history:  COMPARISON: Chest x-ray, 2/13/2023. Carly Ceredo TECHNIQUE:  Precontrast  images were obtained to localize the volume for acquisition. Multislice helical CT arteriography was performed from the diaphragm to the  thoracic inlet during uneventful rapid bolus intravenous contrast  administration. Lung and soft tissue windows were generated. Coronal and  sagittal images were generated and 3D post processing consisting of coronal  maximum intensity images was performed. CT dose reduction was achieved through use of a standardized protocol tailored  for this examination and automatic exposure control for dose modulation. [facility] FINDINGS:  CHEST:  Chest wall/thoracic inlet: Within normal limits. Thyroid: Within normal limits. Mediastinum/manolo: Bilateral hilar adenopathy. Subcarinal adenopathy Calcified  lymph nodes in the right hilum and in the mediastinum. Patulous esophagus. Heart/vessels:  There is a port in the right chest with a right subclavian  approach catheter that terminates in the mid SVC. Coronary artery  calcifications: Not able to be assessed  Lungs/Pleura: Respiratory motion limiting visualization. Mass in the left lower  lobe at the left hemidiaphragm laterally measuring 2.7 x 3.9 x 3 cm. Perihilar  opacities in both lungs. Bibasilar opacities. .  INCIDENTALLY IMAGED ABDOMEN:  Status post cholecystectomy  . MSK:  Minimal degenerative change in the mid thoracic spine. .    Impression  Respiratory motion limits evaluation  1. No visualized pulmonary embolus. 2. Perihilar and bibasilar opacity suggesting airspace disease. 3. Left basilar mass.   4. Incidental findings as above        IMPRESSION:   Acute on chronic hypoxic respiratory failure  Chronic Obstructive Pulmonary Disease   History of stage IV small cell lung cancer  Diarrhea  Postobstructive pneumonia  Hyponatremia and hypokalemia  Pt is requiring Drug therapy requiring intensive monitoring for toxicity  Pt is unstable, unpredictable needing inpatient monitoring; is acutely ill and at high risk of sudden decline and decompensation with severe consequenses and continued end organ dysfunction and failure  Prognosis guarded       RECOMMENDATIONS/PLAN:     28-year-old lady with stage IV small cell lung cancer  She is on chronically on home oxygen we will continue to wean to her baseline oxygen  Arterial blood gases shows mild CO2 retention PCO2 was low she is on 4 L nasal cannula we will continue to wean  Patient is on Zosyn and Levaquin  Oncology evaluation  Getting IV fluid we will check repeat lab for low sodium and potassium supplement  Diarrhea start patient on antidiarrheal medication possibility of C. difficile  Supplemental O2 to keep sats > 93%  Aspiration precautions  Labs to follow electrolytes, renal function and and blood counts  Glucose monitoring and SSI  Bronchial hygiene with respiratory therapy techniques, bronchodilators  DVT, SUP prophylaxis  Pt needs IV fluids with additives and Drug therapy requiring intensive monitoring for toxicity  Prescription drug management with home med reconciliation reviewed       This care involved high complexity medical decision making: I personally:  Reviewed the flowsheet and previous days notes  Reviewed and summarized records or history from previous days note or discussions with staff, family  High Risk Drug therapy requiring intensive monitoring for toxicity: eg steroids, pressors, antibiotics  Reviewed and/or ordered Clinical lab tests  Reviewed images and/or ordered Radiology tests  Reviewed the patients ECG / Telemetry  Reviewed and/or adjusted NiPPV settings  Called and arranged for Radiologic procedures or interventions  performed or ordered Diagnostic endoscopies with identified risk factors.   discussed my assessment/management with : Nursing, Hospitalist and Family for coordination of care          Joe Walker MD

## 2023-02-14 NOTE — ED PROVIDER NOTES
[facility] EMERGENCY DEPT  EMERGENCY DEPARTMENT HISTORY AND PHYSICAL EXAM      Date: 2/13/2023  Patient Name: Kaden Harkins  MRN: [id number]  YOB: 1960  Date of evaluation: 2/13/2023  Provider: Francisco Mccann DO   Note Started: 8:28 PM 2/13/23    HISTORY OF PRESENT ILLNESS     Chief Complaint   Patient presents with    Shortness of Breath       History Provided By: Patient    HPI: Kaden Harkins, 60 y.o. female presenting to the emergency department from her oncology office for further evaluation of shortness of breath and hypoxia. Patient has a history of stage IV small cell lung cancer as well as COPD. To be hypoxic into the 70% at oncology office and referred to the ED for further evaluation/treatment. Patient reports cough, shortness of breath ongoing for the last 3 days. Reports associated fever. No confirmed sick contacts. PAST MEDICAL HISTORY   Past Medical History:  No past medical history on file. Past Surgical History:  No past surgical history on file. Family History:  No family history on file. Social History: Allergies: Allergies   Allergen Reactions    Lisinopril Unknown (comments)       PCP: Shonna Nick MD    Current Meds:   Previous Medications    No medications on file       REVIEW OF SYSTEMS   Review of Systems   Constitutional:  Positive for fatigue and fever. Negative for chills. HENT:  Negative for congestion and sore throat. Eyes:  Negative for pain and visual disturbance. Respiratory:  Positive for cough and shortness of breath. Cardiovascular:  Negative for chest pain and palpitations. Gastrointestinal:  Negative for constipation, diarrhea, nausea and vomiting. Genitourinary:  Negative for dysuria and frequency. Musculoskeletal:  Negative for arthralgias and myalgias. Skin:  Negative for color change and rash. Neurological:  Negative for dizziness, weakness, light-headedness and headaches.    Positives and Pertinent negatives as per HPI.    PHYSICAL EXAM     ED Triage Vitals [02/13/23 1610]   ED Encounter Vitals Group      BP (!) 152/72      Pulse (Heart Rate) (!) 102      Resp Rate 22      Temp (!) 102.1 °F (38.9 °C)      Temp src       O2 Sat (%) (!) 79 %      Weight 206 lb      Height 5' 6\"      Physical Exam  Constitutional:       Appearance: Normal appearance. She is ill-appearing. HENT:      Head: Normocephalic and atraumatic. Right Ear: External ear normal.      Left Ear: External ear normal.      Nose: Nose normal.      Mouth/Throat:      Mouth: Mucous membranes are moist.   Eyes:      Extraocular Movements: Extraocular movements intact. Conjunctiva/sclera: Conjunctivae normal.   Cardiovascular:      Rate and Rhythm: Normal rate and regular rhythm. Pulses: Normal pulses. Heart sounds: Normal heart sounds. Pulmonary:      Effort: Pulmonary effort is normal. Tachypnea present. Breath sounds: Examination of the right-lower field reveals decreased breath sounds. Examination of the left-lower field reveals decreased breath sounds. Decreased breath sounds present. Abdominal:      General: Abdomen is flat. There is no distension. Palpations: Abdomen is soft. Tenderness: There is no abdominal tenderness. Musculoskeletal:         General: Normal range of motion. Cervical back: Normal range of motion. Skin:     General: Skin is warm and dry. Capillary Refill: Capillary refill takes less than 2 seconds. Neurological:      General: No focal deficit present. Mental Status: She is alert and oriented to person, place, and time. Mental status is at baseline.    Psychiatric:         Mood and Affect: Mood normal.         Behavior: Behavior normal.       SCREENINGS               No data recorded      LAB, EKG AND DIAGNOSTIC RESULTS   Labs:  Recent Results (from the past 12 hour(s))   LACTIC ACID    Collection Time: 02/13/23  4:53 PM   Result Value Ref Range    Lactic acid 1.2 0.4 - 2.0 mmol/L METABOLIC PANEL, COMPREHENSIVE    Collection Time: 02/13/23  4:53 PM   Result Value Ref Range    Sodium 126 (L) 136 - 145 mmol/L    Potassium 3.4 (L) 3.5 - 5.1 mmol/L    Chloride 89 (L) 97 - 108 mmol/L    CO2 28 21 - 32 mmol/L    Anion gap 9 5 - 15 mmol/L    Glucose 209 (H) 65 - 100 mg/dL    BUN 14 6 - 20 mg/dL    Creatinine 0.97 0.55 - 1.02 mg/dL    BUN/Creatinine ratio 14 12 - 20      eGFR >60 >60 ml/min/1.73m2    Calcium 9.2 8.5 - 10.1 mg/dL    Bilirubin, total 0.6 0.2 - 1.0 mg/dL    AST (SGOT) 33 15 - 37 U/L    ALT (SGPT) 38 12 - 78 U/L    Alk. phosphatase 68 45 - 117 U/L    Protein, total 8.2 6.4 - 8.2 g/dL    Albumin 3.3 (L) 3.5 - 5.0 g/dL    Globulin 4.9 (H) 2.0 - 4.0 g/dL    A-G Ratio 0.7 (L) 1.1 - 2.2     CBC WITH AUTOMATED DIFF    Collection Time: 02/13/23  4:53 PM   Result Value Ref Range    WBC 2.6 (L) 3.6 - 11.0 K/uL    RBC 4.34 3.80 - 5.20 M/uL    HGB 12.5 11.5 - 16.0 g/dL    HCT 37.6 35.0 - 47.0 %    MCV 86.6 80.0 - 99.0 FL    MCH 28.8 26.0 - 34.0 PG    MCHC 33.2 30.0 - 36.5 g/dL    RDW 13.6 11.5 - 14.5 %    PLATELET 009 039 - 222 K/uL    MPV 10.4 8.9 - 12.9 FL    NRBC 0.0 0.0  WBC    ABSOLUTE NRBC 0.00 0.00 - 0.01 K/uL    NEUTROPHILS 77 (H) 32 - 75 %    LYMPHOCYTES 18 12 - 49 %    MONOCYTES 3 (L) 5 - 13 %    EOSINOPHILS 0 0 - 7 %    BASOPHILS 1 0 - 1 %    IMMATURE GRANULOCYTES 1 (H) 0 - 0.5 %    ABS. NEUTROPHILS 2.1 1.8 - 8.0 K/UL    ABS. LYMPHOCYTES 0.5 (L) 0.8 - 3.5 K/UL    ABS. MONOCYTES 0.1 0.0 - 1.0 K/UL    ABS. EOSINOPHILS 0.0 0.0 - 0.4 K/UL    ABS. BASOPHILS 0.0 0.0 - 0.1 K/UL    ABS. IMM.  GRANS. 0.0 0.00 - 0.04 K/UL    DF AUTOMATED     COVID-19 RAPID TEST    Collection Time: 02/13/23  4:53 PM   Result Value Ref Range    COVID-19 rapid test Not Detected Not Detected     INFLUENZA A & B AG (RAPID TEST)    Collection Time: 02/13/23  4:53 PM   Result Value Ref Range    Influenza A Antigen Negative Negative      Influenza B Antigen Negative Negative     BLOOD GAS, ARTERIAL    Collection Time: 02/13/23  6:36 PM   Result Value Ref Range    pH 7.47 (H) 7.35 - 7.45      PCO2 39 35 - 45 mmHg    PO2 70 (L) 80 - 100 mmHg    BICARBONATE 27 (H) 22 - 26 mmol/L    BASE EXCESS 3.5 (H) 0 - 3 mmol/L    O2 METHOD Nasal Cannula      O2 FLOW RATE 4.00 L/min    FIO2 36.0 %    Sample source Arterial      SITE Right Radial      JUANY'S TEST YES      Carboxy-Hgb PENDING %    Oxyhemoglobin PENDING %    Performed by Paddy Vanegas     TEMPERATURE 98.6         EKG: Initial EKG interpreted by me. Shows     Radiologic Studies:  Non-plain film images such as CT, Ultrasound and MRI are read by the radiologist. Plain radiographic images are visualized and preliminarily interpreted by the ED Provider with the below findings:    *    Interpretation per the Radiologist below, if available at the time of this note:  CTA CHEST W OR W WO CONT    Result Date: 2/13/2023  EXAM:  CTA CHEST W OR W WO CONT INDICATION:  hypoxia, hx Cancer. Additional history: COMPARISON: Chest x-ray, 2/13/2023. Wicho Damon TECHNIQUE: Precontrast  images were obtained to localize the volume for acquisition. Multislice helical CT arteriography was performed from the diaphragm to the thoracic inlet during uneventful rapid bolus intravenous contrast administration. Lung and soft tissue windows were generated. Coronal and sagittal images were generated and 3D post processing consisting of coronal maximum intensity images was performed. CT dose reduction was achieved through use of a standardized protocol tailored for this examination and automatic exposure control for dose modulation. Wicho Damon FINDINGS: CHEST: Chest wall/thoracic inlet: Within normal limits. Thyroid: Within normal limits. Mediastinum/manolo: Bilateral hilar adenopathy. Subcarinal adenopathy Calcified lymph nodes in the right hilum and in the mediastinum. Patulous esophagus. Heart/vessels: There is a port in the right chest with a right subclavian approach catheter that terminates in the mid SVC.  Coronary artery calcifications: Not able to be assessed Lungs/Pleura: Respiratory motion limiting visualization. Mass in the left lower lobe at the left hemidiaphragm laterally measuring 2.7 x 3.9 x 3 cm. Perihilar opacities in both lungs. Bibasilar opacities. . INCIDENTALLY IMAGED ABDOMEN: Status post cholecystectomy . MSK: Minimal degenerative change in the mid thoracic spine. Tunde Jorgensen Respiratory motion limits evaluation 1. No visualized pulmonary embolus. 2. Perihilar and bibasilar opacity suggesting airspace disease. 3. Left basilar mass. 4. Incidental findings as above    XR CHEST PORT    Result Date: 2/13/2023  INDICATION: . Sepsis Additional history: COMPARISON: None. LIMITATIONS: Portable technique. Tunde Jorgensen FINDINGS: Single frontal view of the chest. . Lines/tubes/surgical: There is a port in the right chest with a subclavian approach catheter that projects to terminate in the mid SVC. Cardiac monitor leads overly the patient Heart/mediastinum: Calcifications in the aortic arch. . Lungs/pleura: Hazy opacity over the midlungs and bases with partially obscured hemidiaphragms. Minimal linear opacities in both bases suggesting scarring/atelectasis. No visible pneumothorax. Additional Comments: None. .    1. Bilateral pleural effusions with associated passive atelectasis and/or consolidation. 2. Bibasilar opacities most suggestive of scarring/atelectasis. Developing airspace disease cannot be excluded        PROCEDURES   Unless otherwise noted below, none.   Performed by: Hemanth May, DO   Procedures      CRITICAL CARE TIME       ED COURSE and DIFFERENTIAL DIAGNOSIS/MDM   Vitals:    Vitals:    02/13/23 1800 02/13/23 1809 02/13/23 1842 02/13/23 1845   BP: (!) 169/64   (!) 172/37   Pulse:       Resp:       Temp:       SpO2:  97% 94%    Weight:       Height:            Patient was given the following medications:  Medications   sodium chloride (NS) flush 5-10 mL (has no administration in time range)   levoFLOXacin (LEVAQUIN) 750 mg in D5W IVPB (750 mg IntraVENous New Bag 2/13/23 1835)   albuterol-ipratropium (DUO-NEB) 2.5 MG-0.5 MG/3 ML (3 mL Nebulization Given 2/13/23 1803)   acetaminophen (TYLENOL) tablet 1,000 mg (1,000 mg Oral Given 2/13/23 1835)   iopamidoL (ISOVUE-370) 370 mg iodine /mL (76 %) injection 100 mL (100 mL IntraVENous Given 2/13/23 1935)       CONSULTS: (Who and What was discussed)  None     Chronic Conditions: Lung cancer, COPD  Social Determinants affecting Dx or Tx: None  Counseling:      Records Reviewed (source and summary of external notes): Nursing notes    CC/HPI Summary, DDx, ED Course, and Reassessment: 76-year-old female presenting to the emergency department for evaluation of hypoxia. Patient with history of lung cancer as well as COPD.  SPO2 on room air in the 70s. Shortness of breath with cough x3 days. Imaging demonstrating bilateral pleural effusions with suspected early developing airspace disease. Will initiate empiric antibiotic for suspected pneumonia and admit to the hospital for further treatment/evaluation given patient's immunocompromise status and hypoxia. No evidence of PE on CTA. Disposition Considerations (Tests not done, Shared Decision Making, Pt Expectation of Test or Treatment.):      FINAL IMPRESSION     1. Acute respiratory failure with hypoxia [facility])          DISPOSITION/PLAN   Admitted    Admit Note: Pt is being admitted by Reema. The results of their tests and reason(s) for their admission have been discussed with pt and/or available family. They convey agreement and understanding for the need to be admitted and for the admission diagnosis. PATIENT REFERRED TO:  Follow-up Information    None           DISCHARGE MEDICATIONS:  There are no discharge medications for this patient. DISCONTINUED MEDICATIONS:  There are no discharge medications for this patient.       I am the Primary Clinician of Record: Heladio Coreas DO (electronically signed)    (Please note that parts of this dictation were completed with voice recognition software. Quite often unanticipated grammatical, syntax, homophones, and other interpretive errors are inadvertently transcribed by the computer software. Please disregards these errors.  Please excuse any errors that have escaped final proofreading.)

## 2023-02-14 NOTE — CONSULTS
Hematology/Oncology Consult    Patient: Maria C Maldonado MRN: [id number]     YOB: 1960  Age: 60 y.o. Sex: female      HPI      Maria C Maldonado is a 60 y.o. female who is being seen for history of small cell lung cancer. Ms. Yanick Rogers is a 77-year-old female patient with recent diagnosis of extensive stage small cell lung cancer who was recently started on systemic treatment with carboplatin, etoposide and atezolizumab on 2/6/2023, was admitted with worsening shortness of breath. She is being treated for COPD exacerbation and possible postobstructive pneumonia. Reportedly feeling better today. She has also had diarrhea.       Past medical history:  Hypertension  Diabetes mellitus    Family history:  2 sisters with breast cancer       Social History     Tobacco Use    Smoking status: Not on file    Smokeless tobacco: Not on file   Substance Use Topics    Alcohol use: Not on file      Current Facility-Administered Medications   Medication Dose Route Frequency Provider Last Rate Last Admin    methylPREDNISolone (PF) (SOLU-MEDROL) injection 40 mg  40 mg IntraVENous Q8H Mary Jo Palomares MD   40 mg at 02/14/23 1435    sodium chloride (NS) flush 5-10 mL  5-10 mL IntraVENous PRN Tristan Benavidez MD        levoFLOXacin (LEVAQUIN) 750 mg in D5W IVPB  750 mg IntraVENous Q24H Suzi Benavidez MD   IV Completed at 02/13/23 2005    insulin lispro (HUMALOG) injection   SubCUTAneous AC&HS Suzi Benavidez MD   4 Units at 02/14/23 0818    glucose chewable tablet 16 g  4 Tablet Oral PRN Tristan Benavidez MD        glucagon (GLUCAGEN) injection 1 mg  1 mg IntraMUSCular PRN Louis Garza MD        0.9% sodium chloride infusion  75 mL/hr IntraVENous CONTINUOUS Suzi Benavidez MD 75 mL/hr at 02/14/23 1240 75 mL/hr at 02/14/23 1240    acetaminophen (TYLENOL) tablet 650 mg  650 mg Oral Q6H PRN Suzi Benavidze MD   650 mg at 02/14/23 1239    Or    acetaminophen (TYLENOL) suppository 650 mg  650 mg Rectal Q6H PRN Darrell Higuera MD        polyethylene glycol (MIRALAX) packet 17 g  17 g Oral DAILY PRN Ethan Benavidez MD        ondansetron (ZOFRAN ODT) tablet 4 mg  4 mg Oral Q8H PRN Ethan Bneavidez MD        Or    ondansetron [facility] injection 4 mg  4 mg IntraVENous Q6H PRN Suzi Benavidez MD        enoxaparin (LOVENOX) injection 40 mg  40 mg SubCUTAneous DAILY uSzi Benavidez MD   40 mg at 02/14/23 0818    albuterol-ipratropium (DUO-NEB) 2.5 MG-0.5 MG/3 ML  3 mL Nebulization Q6H RT Suzi Benavidez MD   3 mL at 02/14/23 1448    budesonide-formoteroL (SYMBICORT) 160-4.5 mcg/actuation HFA inhaler 2 Puff  2 Puff Inhalation BID Darrell Higuera MD   2 Puff at 02/14/23 0923    piperacillin-tazobactam (ZOSYN) 3.375 g in 0.9% sodium chloride (MBP/ADV) 100 mL MBP  3.375 g IntraVENous Q8H Suzi Benavidez MD 25 mL/hr at 02/14/23 1239 3.375 g at 02/14/23 1239        Allergies   Allergen Reactions    Lisinopril Unknown (comments)       Review of Systems:  Constitutional No fevers, chills, night sweats, excessive fatigue or weight loss. Allergic/Immunologic No recent allergic reactions   Eyes No significant visual difficulties. No diplopia. ENMT No problems with hearing, no sore throat, no sinus drainage. Endocrine No hot flashes or night sweats. No cold intolerance, polyuria, or polydipsia   Hematologic/Lymphatic No easy bruising or bleeding. The patient denies any tender or palpable lymph nodes   Breasts No abnormal masses of breast, nipple discharge or pain. Respiratory No dyspnea on exertion, orthopnea, chest pain, cough or hemoptysis. Cardiovascular No anginal chest pain, irregular heart beat, tachycardia, palpitations or orthopnea. Gastrointestinal No nausea, vomiting, diarrhea, constipation, cramping, dysphagia, reflux, heartburn, GI bleeding, or early satiety. No change in bowel habits. Genitourinary (M) No hematuria, dysuria, increased frequency, urgency, hesitancy or incontinence. Musculoskeletal No joint pain, swelling or redness. No decreased range of motion. Integumentary No chronic rashes, inflammation, ulcerations, pruritus, petechiae, purpura, ecchymoses, or skin changes. Neurologic No headache, blurred vision, and no areas of focal weakness or numbness. Normal gait. No sensory problems. Psychiatric No insomnia, depression, victoria or mood swings. No psychotropic drugs. Objective:     Vitals:    02/14/23 1200 02/14/23 1416 02/14/23 1449 02/14/23 1456   BP:  (!) 114/55     Pulse: (!) 108 99     Resp:  20     Temp:  98.2 °F (36.8 °C)     SpO2:  (!) 87% 91% 92%   Weight:       Height:            Physical Exam:  Constitutional Alert, cooperative, oriented. Mood and affect appropriate. Appears close to chronological age. Well nourished. Well developed. Head Normocephalic; no scars   Eyes Conjunctivae and sclerae are clear and without icterus. Pupils are reactive and equal.   ENMT Sinuses are nontender. No oral exudates, ulcers, masses, thrush or mucositis. Oropharynx clear. Tongue normal.   Neck Supple without masses or thyromegaly. No jugular venous distension. Hematologic/Lymphatic No petechiae or purpura. No tender or palpable lymph nodes in the cervical, supraclavicular, axillary or inguinal area. Respiratory Lungs are clear to auscultation without rhonchi or wheezing. Cardiovascular Regular rate and rhythm of heart without murmurs, gallops or rubs. Chest / Line Site Chest is symmetric with no chest wall deformities. Abdomen Non-tender, non-distended, no masses, ascites or hepatosplenomegaly. Good bowel sounds. No guarding or rebound tenderness. No pulsatile masses. Musculoskeletal No tenderness or swelling, normal range of motion without obvious weakness. Extremities No visible deformities, no cyanosis, clubbing or edema. Skin No rashes, scars, or lesions suggestive of malignancy. No petechiae, purpura, or ecchymoses. No excoriations.     Neurologic No sensory or motor deficits, normal cerebellar function, normal gait, cranial nerves intact. Psychiatric Alert and oriented times three. Coherent speech. Verbalizes understanding of our discussions today. Lab/Data Review:  Recent Labs     02/13/23  1653   WBC 2.6*   HGB 12.5   HCT 37.6        Recent Labs     02/14/23  0905 02/13/23  1653   * 126*   K 3.4* 3.4*    89*   CO2 29 28   * 209*   BUN 9 14   CREA 0.93 0.97   CA 8.6 9.2   ALB 2.8* 3.3*   TBILI 0.4 0.6   ALT 51 38     Recent Labs     02/13/23  2238 02/13/23  1836   PH 7.43 7.47*   PCO2 46* 39   PO2 54* 70*   HCO3 30* 27*   FIO2  --  36.0     Recent Results (from the past 24 hour(s))   CULTURE, BLOOD    Collection Time: 02/13/23  4:53 PM    Specimen: Blood   Result Value Ref Range    Special Requests: No Special Requests      Culture result: No growth after 5 hours     LACTIC ACID    Collection Time: 02/13/23  4:53 PM   Result Value Ref Range    Lactic acid 1.2 0.4 - 2.0 mmol/L   CULTURE, BLOOD    Collection Time: 02/13/23  4:53 PM    Specimen: Blood   Result Value Ref Range    Special Requests: No Special Requests      Culture result: No growth after 5 hours     METABOLIC PANEL, COMPREHENSIVE    Collection Time: 02/13/23  4:53 PM   Result Value Ref Range    Sodium 126 (L) 136 - 145 mmol/L    Potassium 3.4 (L) 3.5 - 5.1 mmol/L    Chloride 89 (L) 97 - 108 mmol/L    CO2 28 21 - 32 mmol/L    Anion gap 9 5 - 15 mmol/L    Glucose 209 (H) 65 - 100 mg/dL    BUN 14 6 - 20 mg/dL    Creatinine 0.97 0.55 - 1.02 mg/dL    BUN/Creatinine ratio 14 12 - 20      eGFR >60 >60 ml/min/1.73m2    Calcium 9.2 8.5 - 10.1 mg/dL    Bilirubin, total 0.6 0.2 - 1.0 mg/dL    AST (SGOT) 33 15 - 37 U/L    ALT (SGPT) 38 12 - 78 U/L    Alk.  phosphatase 68 45 - 117 U/L    Protein, total 8.2 6.4 - 8.2 g/dL    Albumin 3.3 (L) 3.5 - 5.0 g/dL    Globulin 4.9 (H) 2.0 - 4.0 g/dL    A-G Ratio 0.7 (L) 1.1 - 2.2     CBC WITH AUTOMATED DIFF    Collection Time: 02/13/23 4:53 PM   Result Value Ref Range    WBC 2.6 (L) 3.6 - 11.0 K/uL    RBC 4.34 3.80 - 5.20 M/uL    HGB 12.5 11.5 - 16.0 g/dL    HCT 37.6 35.0 - 47.0 %    MCV 86.6 80.0 - 99.0 FL    MCH 28.8 26.0 - 34.0 PG    MCHC 33.2 30.0 - 36.5 g/dL    RDW 13.6 11.5 - 14.5 %    PLATELET 561 444 - 992 K/uL    MPV 10.4 8.9 - 12.9 FL    NRBC 0.0 0.0  WBC    ABSOLUTE NRBC 0.00 0.00 - 0.01 K/uL    NEUTROPHILS 77 (H) 32 - 75 %    LYMPHOCYTES 18 12 - 49 %    MONOCYTES 3 (L) 5 - 13 %    EOSINOPHILS 0 0 - 7 %    BASOPHILS 1 0 - 1 %    IMMATURE GRANULOCYTES 1 (H) 0 - 0.5 %    ABS. NEUTROPHILS 2.1 1.8 - 8.0 K/UL    ABS. LYMPHOCYTES 0.5 (L) 0.8 - 3.5 K/UL    ABS. MONOCYTES 0.1 0.0 - 1.0 K/UL    ABS. EOSINOPHILS 0.0 0.0 - 0.4 K/UL    ABS. BASOPHILS 0.0 0.0 - 0.1 K/UL    ABS. IMM.  GRANS. 0.0 0.00 - 0.04 K/UL    DF AUTOMATED     COVID-19 RAPID TEST    Collection Time: 02/13/23  4:53 PM   Result Value Ref Range    COVID-19 rapid test Not Detected Not Detected     INFLUENZA A & B AG (RAPID TEST)    Collection Time: 02/13/23  4:53 PM   Result Value Ref Range    Influenza A Antigen Negative Negative      Influenza B Antigen Negative Negative     HEMOGLOBIN A1C WITH EAG    Collection Time: 02/13/23  4:53 PM   Result Value Ref Range    Hemoglobin A1c 8.1 (H) 4.0 - 5.6 %    Est. average glucose 186 mg/dL   BLOOD GAS, ARTERIAL    Collection Time: 02/13/23  6:36 PM   Result Value Ref Range    pH 7.47 (H) 7.35 - 7.45      PCO2 39 35 - 45 mmHg    PO2 70 (L) 80 - 100 mmHg    BICARBONATE 27 (H) 22 - 26 mmol/L    BASE EXCESS 3.5 (H) 0 - 3 mmol/L    O2 METHOD Nasal Cannula      O2 FLOW RATE 4.00 L/min    FIO2 36.0 %    Sample source Arterial      SITE Right Radial      JUANY'S TEST YES      Carboxy-Hgb PENDING %    Oxyhemoglobin PENDING %    Performed by Chioma Whitlock     TEMPERATURE 98.6     GLUCOSE, POC    Collection Time: 02/13/23 10:31 PM   Result Value Ref Range    Glucose (POC) 195 (H) 65 - 100 mg/dL    Performed by [address] [place] [address], ARTERIAL    Collection Time: 02/13/23 10:38 PM   Result Value Ref Range    pH 7.43 7.35 - 7.45      PCO2 46 (H) 35 - 45 mmHg    PO2 54 (L) 80 - 100 mmHg    BICARBONATE 30 (H) 22 - 26 mmol/L    BASE EXCESS 4.9 (H) 0 - 3 mmol/L    O2 METHOD Nasal Cannula      O2 FLOW RATE 4.00 L/min    Sample source Arterial      SITE Right Radial      JUANY'S TEST YES      Carboxy-Hgb PENDING %    Oxyhemoglobin PENDING %    Performed by Hosea Fabry Minor     TEMPERATURE 98.3     URINALYSIS W/ RFLX MICROSCOPIC    Collection Time: 02/13/23 11:19 PM   Result Value Ref Range    Color Yellow/Straw      Appearance Clear Clear      Specific gravity 1.022 1.003 - 1.030      pH (UA) 5.0      Protein Negative Negative mg/dL    Glucose >1,000 (A) Negative mg/dL    Ketone Negative Negative mg/dL    Bilirubin Negative Negative      Blood Negative Negative      Urobilinogen 0.1 (L) 0.2 - 1.0 EU/dL    Nitrites Negative Negative      Leukocyte Esterase Negative Negative     GLUCOSE, POC    Collection Time: 02/14/23  7:22 AM   Result Value Ref Range    Glucose (POC) 244 (H) 65 - 100 mg/dL    Performed by Mikaela Rao    LACTIC ACID    Collection Time: 02/14/23  9:05 AM   Result Value Ref Range    Lactic acid 1.1 0.4 - 2.0 mmol/L   METABOLIC PANEL, COMPREHENSIVE    Collection Time: 02/14/23  9:05 AM   Result Value Ref Range    Sodium 133 (L) 136 - 145 mmol/L    Potassium 3.4 (L) 3.5 - 5.1 mmol/L    Chloride 100 97 - 108 mmol/L    CO2 29 21 - 32 mmol/L    Anion gap 4 (L) 5 - 15 mmol/L    Glucose 211 (H) 65 - 100 mg/dL    BUN 9 6 - 20 mg/dL    Creatinine 0.93 0.55 - 1.02 mg/dL    BUN/Creatinine ratio 10 (L) 12 - 20      eGFR >60 >60 ml/min/1.73m2    Calcium 8.6 8.5 - 10.1 mg/dL    Bilirubin, total 0.4 0.2 - 1.0 mg/dL    AST (SGOT) 55 (H) 15 - 37 U/L    ALT (SGPT) 51 12 - 78 U/L    Alk.  phosphatase 59 45 - 117 U/L    Protein, total 7.7 6.4 - 8.2 g/dL    Albumin 2.8 (L) 3.5 - 5.0 g/dL    Globulin 4.9 (H) 2.0 - 4.0 g/dL    A-G Ratio 0.6 (L) 1.1 - 2.2 GLUCOSE, POC    Collection Time: 02/14/23 11:22 AM   Result Value Ref Range    Glucose (POC) 109 (H) 65 - 100 mg/dL    Performed by Alexandr Palomo         CTA CHEST W OR W WO CONT    Result Date: 2/13/2023  Respiratory motion limits evaluation 1. No visualized pulmonary embolus. 2. Perihilar and bibasilar opacity suggesting airspace disease. 3. Left basilar mass. 4. Incidental findings as above    XR CHEST PORT    Result Date: 2/13/2023  1. Bilateral pleural effusions with associated passive atelectasis and/or consolidation. 2. Bibasilar opacities most suggestive of scarring/atelectasis. Developing airspace disease cannot be excluded        Assessment and Plan:     Hospital Problems  Never Reviewed            Codes Class Noted POA    Respiratory failure with hypoxia ([facility] [address] ICD-10-CM: J96.91  ICD-9-CM: 518.81  2/13/2023 Unknown        Pneumonia ICD-10-CM: J18.9  ICD-9-CM: 424  2/13/2023 Unknown           Extensive stage small cell lung cancer:  -Started carboplatin, etoposide, atezolizumab on 2/6/2023  -ANC is 2.1: Would expect ANC to decline due to recent chemotherapy  -Neutropenic precautions for ANC less than 1.0  -Follows with Dr. Hernandez Queen    Brain metastasis:  -Status post gamma knife treatment. Dyspnea:  -Secondary to COPD and possible postobstructive pneumonia  -On steroids and antibiotics. DVT prophylaxis:  -On Lovenox. Thank you for the consult.     Signed By: Heather Nicolas MD     February 14, 2023

## 2023-02-14 NOTE — PROGRESS NOTES
General Daily Progress Note          Patient Name:   Bette Warner       YOB: 1960       Age:  60 y.o. Admit Date: 2/13/2023      Subjective:     CHIEF COMPLAINT:      Shortness of breath     HISTORY OF PRESENT ILLNESS:        Patient is a 60y.o. year old female with a past medical history of type 2 diabetes, hypertension, history of lung cancer status post chemoradiation on home oxygen PRN who presented to emergency room complaining of shortness of breath with associated decreased appetite for last 4 days. In the ED, pt had a temperature of 102. 1. Her oxygen saturation was 75% initially. DuoNeb was given by EMS and her saturation improved to 94%. CT scan of the chest done shows perihilar basilar opacity suggestive of airspace disease. Pt was negative for flu and COVID. She denies any fever, chills or chest pain. CTA Chest shows  1. No visualized pulmonary embolus. 2. Perihilar and bibasilar opacity suggesting airspace disease. 3. Left basilar mass. 2/14  Pt reports that she has been having diarrhea while in the hospital. She notes that the lightheadedness and shortness of breath that she had upon presentation has resolved. Pt mentions that she had a recently diagnosis of DM. Pt's urine had >1000 mg/dL of glucose and her potassium is 3.4. Pt was found to have gram negative PNA.       Objective:     Visit Vitals  BP (!) 119/56 (BP 1 Location: Right upper arm, BP Patient Position: Semi fowlers)   Pulse (!) 102   Temp 99.1 °F (37.3 °C)   Resp 18   Ht 5' 6\" (1.676 m)   Wt 206 lb (93.4 kg)   SpO2 94%   BMI 33.25 kg/m²        Recent Results (from the past 24 hour(s))   LACTIC ACID    Collection Time: 02/13/23  4:53 PM   Result Value Ref Range    Lactic acid 1.2 0.4 - 2.0 mmol/L   METABOLIC PANEL, COMPREHENSIVE    Collection Time: 02/13/23  4:53 PM   Result Value Ref Range    Sodium 126 (L) 136 - 145 mmol/L    Potassium 3.4 (L) 3.5 - 5.1 mmol/L    Chloride 89 (L) 97 - 108 mmol/L    CO2 28 21 - 32 mmol/L    Anion gap 9 5 - 15 mmol/L    Glucose 209 (H) 65 - 100 mg/dL    BUN 14 6 - 20 mg/dL    Creatinine 0.97 0.55 - 1.02 mg/dL    BUN/Creatinine ratio 14 12 - 20      eGFR >60 >60 ml/min/1.73m2    Calcium 9.2 8.5 - 10.1 mg/dL    Bilirubin, total 0.6 0.2 - 1.0 mg/dL    AST (SGOT) 33 15 - 37 U/L    ALT (SGPT) 38 12 - 78 U/L    Alk. phosphatase 68 45 - 117 U/L    Protein, total 8.2 6.4 - 8.2 g/dL    Albumin 3.3 (L) 3.5 - 5.0 g/dL    Globulin 4.9 (H) 2.0 - 4.0 g/dL    A-G Ratio 0.7 (L) 1.1 - 2.2     CBC WITH AUTOMATED DIFF    Collection Time: 02/13/23  4:53 PM   Result Value Ref Range    WBC 2.6 (L) 3.6 - 11.0 K/uL    RBC 4.34 3.80 - 5.20 M/uL    HGB 12.5 11.5 - 16.0 g/dL    HCT 37.6 35.0 - 47.0 %    MCV 86.6 80.0 - 99.0 FL    MCH 28.8 26.0 - 34.0 PG    MCHC 33.2 30.0 - 36.5 g/dL    RDW 13.6 11.5 - 14.5 %    PLATELET 749 258 - 095 K/uL    MPV 10.4 8.9 - 12.9 FL    NRBC 0.0 0.0  WBC    ABSOLUTE NRBC 0.00 0.00 - 0.01 K/uL    NEUTROPHILS 77 (H) 32 - 75 %    LYMPHOCYTES 18 12 - 49 %    MONOCYTES 3 (L) 5 - 13 %    EOSINOPHILS 0 0 - 7 %    BASOPHILS 1 0 - 1 %    IMMATURE GRANULOCYTES 1 (H) 0 - 0.5 %    ABS. NEUTROPHILS 2.1 1.8 - 8.0 K/UL    ABS. LYMPHOCYTES 0.5 (L) 0.8 - 3.5 K/UL    ABS. MONOCYTES 0.1 0.0 - 1.0 K/UL    ABS. EOSINOPHILS 0.0 0.0 - 0.4 K/UL    ABS. BASOPHILS 0.0 0.0 - 0.1 K/UL    ABS. IMM.  GRANS. 0.0 0.00 - 0.04 K/UL    DF AUTOMATED     COVID-19 RAPID TEST    Collection Time: 02/13/23  4:53 PM   Result Value Ref Range    COVID-19 rapid test Not Detected Not Detected     INFLUENZA A & B AG (RAPID TEST)    Collection Time: 02/13/23  4:53 PM   Result Value Ref Range    Influenza A Antigen Negative Negative      Influenza B Antigen Negative Negative     BLOOD GAS, ARTERIAL    Collection Time: 02/13/23  6:36 PM   Result Value Ref Range    pH 7.47 (H) 7.35 - 7.45      PCO2 39 35 - 45 mmHg    PO2 70 (L) 80 - 100 mmHg    BICARBONATE 27 (H) 22 - 26 mmol/L    BASE EXCESS 3.5 (H) 0 - 3 mmol/L    O2 METHOD Nasal Cannula      O2 FLOW RATE 4.00 L/min    FIO2 36.0 %    Sample source Arterial      SITE Right Radial      JUANY'S TEST YES      Carboxy-Hgb PENDING %    Oxyhemoglobin PENDING %    Performed by Abril Walsh     TEMPERATURE 98.6     GLUCOSE, POC    Collection Time: 02/13/23 10:31 PM   Result Value Ref Range    Glucose (POC) 195 (H) 65 - 100 mg/dL    Performed by [address] [place] [address], ARTERIAL    Collection Time: 02/13/23 10:38 PM   Result Value Ref Range    pH 7.43 7.35 - 7.45      PCO2 46 (H) 35 - 45 mmHg    PO2 54 (L) 80 - 100 mmHg    BICARBONATE 30 (H) 22 - 26 mmol/L    BASE EXCESS 4.9 (H) 0 - 3 mmol/L    O2 METHOD Nasal Cannula      O2 FLOW RATE 4.00 L/min    Sample source Arterial      SITE Right Radial      JUANY'S TEST YES      Carboxy-Hgb PENDING %    Oxyhemoglobin PENDING %    Performed by Jimmie Modi [place]     TEMPERATURE 98.3     URINALYSIS W/ RFLX MICROSCOPIC    Collection Time: 02/13/23 11:19 PM   Result Value Ref Range    Color Yellow/Straw      Appearance Clear Clear      Specific gravity 1.022 1.003 - 1.030      pH (UA) 5.0      Protein Negative Negative mg/dL    Glucose >1,000 (A) Negative mg/dL    Ketone Negative Negative mg/dL    Bilirubin Negative Negative      Blood Negative Negative      Urobilinogen 0.1 (L) 0.2 - 1.0 EU/dL    Nitrites Negative Negative      Leukocyte Esterase Negative Negative     GLUCOSE, POC    Collection Time: 02/14/23  7:22 AM   Result Value Ref Range    Glucose (POC) 244 (H) 65 - 100 mg/dL    Performed by Kathryn Hoffmann    LACTIC ACID    Collection Time: 02/14/23  9:05 AM   Result Value Ref Range    Lactic acid 1.1 0.4 - 2.0 mmol/L   METABOLIC PANEL, COMPREHENSIVE    Collection Time: 02/14/23  9:05 AM   Result Value Ref Range    Sodium 133 (L) 136 - 145 mmol/L    Potassium 3.4 (L) 3.5 - 5.1 mmol/L    Chloride 100 97 - 108 mmol/L    CO2 29 21 - 32 mmol/L    Anion gap 4 (L) 5 - 15 mmol/L    Glucose 211 (H) 65 - 100 mg/dL    BUN 9 6 - 20 mg/dL Creatinine 0.93 0.55 - 1.02 mg/dL    BUN/Creatinine ratio 10 (L) 12 - 20      eGFR >60 >60 ml/min/1.73m2    Calcium 8.6 8.5 - 10.1 mg/dL    Bilirubin, total 0.4 0.2 - 1.0 mg/dL    AST (SGOT) 55 (H) 15 - 37 U/L    ALT (SGPT) 51 12 - 78 U/L    Alk. phosphatase 59 45 - 117 U/L    Protein, total 7.7 6.4 - 8.2 g/dL    Albumin 2.8 (L) 3.5 - 5.0 g/dL    Globulin 4.9 (H) 2.0 - 4.0 g/dL    A-G Ratio 0.6 (L) 1.1 - 2.2         Review of Systems    Constitutional: Negative for chills and fever. HENT: Negative. Eyes: Negative. Respiratory: Shortness of breath. Cardiovascular: Negative. Gastrointestinal: Negative for abdominal pain and nausea. Skin: Negative. Neurological: Negative. Physical Exam:      Constitutional: pt is oriented to person, place, and time. HENT:   Head: Normocephalic and atraumatic. Eyes: Pupils are equal, round, and reactive to light. EOM are normal.   Cardiovascular: Normal rate, regular rhythm and normal heart sounds. Pulmonary/Chest: Moderate wheezing diffusely. Abdominal: Soft. Bowel sounds are normal. There is no abdominal tenderness. There is no rebound and no guarding. Musculoskeletal: Normal range of motion. Neurological: pt is alert and oriented to person, place, and time. CTA CHEST W OR W WO CONT   Final Result   Respiratory motion limits evaluation   1. No visualized pulmonary embolus. 2. Perihilar and bibasilar opacity suggesting airspace disease. 3. Left basilar mass. 4. Incidental findings as above      XR CHEST PORT   Final Result   1. Bilateral pleural effusions with associated passive atelectasis and/or   consolidation. 2. Bibasilar opacities most suggestive of scarring/atelectasis.  Developing   airspace disease cannot be excluded                  Recent Results (from the past 24 hour(s))   LACTIC ACID    Collection Time: 02/13/23  4:53 PM   Result Value Ref Range    Lactic acid 1.2 0.4 - 2.0 mmol/L   METABOLIC PANEL, COMPREHENSIVE Collection Time: 02/13/23  4:53 PM   Result Value Ref Range    Sodium 126 (L) 136 - 145 mmol/L    Potassium 3.4 (L) 3.5 - 5.1 mmol/L    Chloride 89 (L) 97 - 108 mmol/L    CO2 28 21 - 32 mmol/L    Anion gap 9 5 - 15 mmol/L    Glucose 209 (H) 65 - 100 mg/dL    BUN 14 6 - 20 mg/dL    Creatinine 0.97 0.55 - 1.02 mg/dL    BUN/Creatinine ratio 14 12 - 20      eGFR >60 >60 ml/min/1.73m2    Calcium 9.2 8.5 - 10.1 mg/dL    Bilirubin, total 0.6 0.2 - 1.0 mg/dL    AST (SGOT) 33 15 - 37 U/L    ALT (SGPT) 38 12 - 78 U/L    Alk. phosphatase 68 45 - 117 U/L    Protein, total 8.2 6.4 - 8.2 g/dL    Albumin 3.3 (L) 3.5 - 5.0 g/dL    Globulin 4.9 (H) 2.0 - 4.0 g/dL    A-G Ratio 0.7 (L) 1.1 - 2.2     CBC WITH AUTOMATED DIFF    Collection Time: 02/13/23  4:53 PM   Result Value Ref Range    WBC 2.6 (L) 3.6 - 11.0 K/uL    RBC 4.34 3.80 - 5.20 M/uL    HGB 12.5 11.5 - 16.0 g/dL    HCT 37.6 35.0 - 47.0 %    MCV 86.6 80.0 - 99.0 FL    MCH 28.8 26.0 - 34.0 PG    MCHC 33.2 30.0 - 36.5 g/dL    RDW 13.6 11.5 - 14.5 %    PLATELET 592 723 - 144 K/uL    MPV 10.4 8.9 - 12.9 FL    NRBC 0.0 0.0  WBC    ABSOLUTE NRBC 0.00 0.00 - 0.01 K/uL    NEUTROPHILS 77 (H) 32 - 75 %    LYMPHOCYTES 18 12 - 49 %    MONOCYTES 3 (L) 5 - 13 %    EOSINOPHILS 0 0 - 7 %    BASOPHILS 1 0 - 1 %    IMMATURE GRANULOCYTES 1 (H) 0 - 0.5 %    ABS. NEUTROPHILS 2.1 1.8 - 8.0 K/UL    ABS. LYMPHOCYTES 0.5 (L) 0.8 - 3.5 K/UL    ABS. MONOCYTES 0.1 0.0 - 1.0 K/UL    ABS. EOSINOPHILS 0.0 0.0 - 0.4 K/UL    ABS. BASOPHILS 0.0 0.0 - 0.1 K/UL    ABS. IMM.  GRANS. 0.0 0.00 - 0.04 K/UL    DF AUTOMATED     COVID-19 RAPID TEST    Collection Time: 02/13/23  4:53 PM   Result Value Ref Range    COVID-19 rapid test Not Detected Not Detected     INFLUENZA A & B AG (RAPID TEST)    Collection Time: 02/13/23  4:53 PM   Result Value Ref Range    Influenza A Antigen Negative Negative      Influenza B Antigen Negative Negative     BLOOD GAS, ARTERIAL    Collection Time: 02/13/23  6:36 PM   Result Value Ref Range    pH 7.47 (H) 7.35 - 7.45      PCO2 39 35 - 45 mmHg    PO2 70 (L) 80 - 100 mmHg    BICARBONATE 27 (H) 22 - 26 mmol/L    BASE EXCESS 3.5 (H) 0 - 3 mmol/L    O2 METHOD Nasal Cannula      O2 FLOW RATE 4.00 L/min    FIO2 36.0 %    Sample source Arterial      SITE Right Radial      JUANY'S TEST YES      Carboxy-Hgb PENDING %    Oxyhemoglobin PENDING %    Performed by Nany Matthews     TEMPERATURE 98.6     GLUCOSE, POC    Collection Time: 02/13/23 10:31 PM   Result Value Ref Range    Glucose (POC) 195 (H) 65 - 100 mg/dL    Performed by [address] [place] [address], ARTERIAL    Collection Time: 02/13/23 10:38 PM   Result Value Ref Range    pH 7.43 7.35 - 7.45      PCO2 46 (H) 35 - 45 mmHg    PO2 54 (L) 80 - 100 mmHg    BICARBONATE 30 (H) 22 - 26 mmol/L    BASE EXCESS 4.9 (H) 0 - 3 mmol/L    O2 METHOD Nasal Cannula      O2 FLOW RATE 4.00 L/min    Sample source Arterial      SITE Right Radial      JUANY'S TEST YES      Carboxy-Hgb PENDING %    Oxyhemoglobin PENDING %    Performed by Yanet Pimentel     TEMPERATURE 98.3     URINALYSIS W/ RFLX MICROSCOPIC    Collection Time: 02/13/23 11:19 PM   Result Value Ref Range    Color Yellow/Straw      Appearance Clear Clear      Specific gravity 1.022 1.003 - 1.030      pH (UA) 5.0      Protein Negative Negative mg/dL    Glucose >1,000 (A) Negative mg/dL    Ketone Negative Negative mg/dL    Bilirubin Negative Negative      Blood Negative Negative      Urobilinogen 0.1 (L) 0.2 - 1.0 EU/dL    Nitrites Negative Negative      Leukocyte Esterase Negative Negative     GLUCOSE, POC    Collection Time: 02/14/23  7:22 AM   Result Value Ref Range    Glucose (POC) 244 (H) 65 - 100 mg/dL    Performed by Alec Shields    LACTIC ACID    Collection Time: 02/14/23  9:05 AM   Result Value Ref Range    Lactic acid 1.1 0.4 - 2.0 mmol/L   METABOLIC PANEL, COMPREHENSIVE    Collection Time: 02/14/23  9:05 AM   Result Value Ref Range    Sodium 133 (L) 136 - 145 mmol/L    Potassium 3.4 (L) 3.5 - 5.1 mmol/L    Chloride 100 97 - 108 mmol/L    CO2 29 21 - 32 mmol/L    Anion gap 4 (L) 5 - 15 mmol/L    Glucose 211 (H) 65 - 100 mg/dL    BUN 9 6 - 20 mg/dL    Creatinine 0.93 0.55 - 1.02 mg/dL    BUN/Creatinine ratio 10 (L) 12 - 20      eGFR >60 >60 ml/min/1.73m2    Calcium 8.6 8.5 - 10.1 mg/dL    Bilirubin, total 0.4 0.2 - 1.0 mg/dL    AST (SGOT) 55 (H) 15 - 37 U/L    ALT (SGPT) 51 12 - 78 U/L    Alk. phosphatase 59 45 - 117 U/L    Protein, total 7.7 6.4 - 8.2 g/dL    Albumin 2.8 (L) 3.5 - 5.0 g/dL    Globulin 4.9 (H) 2.0 - 4.0 g/dL    A-G Ratio 0.6 (L) 1.1 - 2.2         Results       Procedure Component Value Units Date/Time    CULTURE, BLOOD #1 [[id number]]     Order Status: Canceled Specimen: Blood     CULTURE, BLOOD #2 [[id number]]     Order Status: Canceled Specimen: Blood     CULTURE, BLOOD [[id number]] Collected: 02/13/23 1653    Order Status: Sent Specimen: Blood Updated: 02/13/23 1708    CULTURE, BLOOD [[id number]] Collected: 02/13/23 1653    Order Status: Sent Specimen: Blood Updated: 02/13/23 1709    COVID-19 RAPID TEST [[id number]] Collected: 02/13/23 1653    Order Status: Completed Specimen: Nasopharyngeal Updated: 02/13/23 1745     COVID-19 rapid test Not Detected        Comment: Rapid [employer] ID Now   The specimen is NEGATIVE for SARS-CoV2, the novel coronavirus associated with COVID-19. A negative result does not rule out COVID-19. This test has been authorized by the FDA under an Emergency Use Authorization (EUA) for use by authorized laboratories.    Fact sheet for Healthcare Providers:  http://www.naif.juaquin/ Fact sheet for Patients: http://www.marta-nataly.bialan/   Methodology: Isothermal Nucleic Acid Amplification       INFLUENZA A & B AG (RAPID TEST) [[id number]] Collected: 02/13/23 1653    Order Status: Canceled Specimen: Nasopharyngeal from Nasal washing     INFLUENZA A & B AG (RAPID TEST) [[id number]] Collected: 02/13/23 1653    Order Status: Completed Specimen: Nasal washing Updated: 02/13/23 1748     Influenza A Antigen Negative        Influenza B Antigen Negative                Labs:     Recent Labs     02/13/23  1653   WBC 2.6*   HGB 12.5   HCT 37.6        Recent Labs     02/14/23  0905 02/13/23  1653   * 126*   K 3.4* 3.4*    89*   CO2 29 28   BUN 9 14   CREA 0.93 0.97   * 209*   CA 8.6 9.2     Recent Labs     02/14/23  0905 02/13/23  1653   ALT 51 38   AP 59 68   TBILI 0.4 0.6   TP 7.7 8.2   ALB 2.8* 3.3*   GLOB 4.9* 4.9*     No results for input(s): INR, PTP, APTT, INREXT in the last 72 hours. No results for input(s): FE, TIBC, PSAT, FERR in the last 72 hours. No results found for: FOL, RBCF   Recent Labs     02/13/23 2238 02/13/23  1836   PH 7.43 7.47*   PCO2 46* 39   PO2 54* 70*     No results for input(s): CPK, CKNDX, TROIQ in the last 72 hours.     No lab exists for component: CPKMB  No results found for: CHOL, CHOLX, CHLST, CHOLV, HDL, HDLP, LDL, LDLC, DLDLP, TGLX, TRIGL, TRIGP, CHHD, CHHDX  Lab Results   Component Value Date/Time    Glucose (POC) 244 (H) 02/14/2023 07:22 AM    Glucose (POC) 195 (H) 02/13/2023 10:31 PM     Lab Results   Component Value Date/Time    Color Yellow/Straw 02/13/2023 11:19 PM    Appearance Clear 02/13/2023 11:19 PM    Specific gravity 1.022 02/13/2023 11:19 PM    pH (UA) 5.0 02/13/2023 11:19 PM    Protein Negative 02/13/2023 11:19 PM    Glucose >1,000 (A) 02/13/2023 11:19 PM    Ketone Negative 02/13/2023 11:19 PM    Bilirubin Negative 02/13/2023 11:19 PM    Urobilinogen 0.1 (L) 02/13/2023 11:19 PM    Nitrites Negative 02/13/2023 11:19 PM    Leukocyte Esterase Negative 02/13/2023 11:19 PM         Assessment:     Gram negative PNA  Levaquin 750 mg every 24 hours  3.375 g IV Zosyn every 8 hours     Acute hypoxic respiratory failure on 4L nasal cannula O2 sat 98%  Duo neb 3 mL every 6 hours  Symbicort 2 puffs BID      History of lung cancer  Heme/onc consult  Pulmonology consult     Type 2 diabetes  Humalog injection 4 times daily    Hypertension  Monitor blood pressure, may need to start on metoprolol    Hyponatremia  Sodium 133, pt receiving sodium chloride IV fluids    Hypokalemia  Replete potassium with 40 mg potassium      Plan:     Lovenox for DVT prophylaxis         Current Facility-Administered Medications:     methylPREDNISolone (PF) (SOLU-MEDROL) injection 40 mg, 40 mg, IntraVENous, Q8H, Javier Palomares MD    sodium chloride (NS) flush 5-10 mL, 5-10 mL, IntraVENous, PRN, Dimas Benavidez MD    levoFLOXacin (LEVAQUIN) 750 mg in D5W IVPB, 750 mg, IntraVENous, Q24H, Suzi Benavidez MD, IV Completed at 02/13/23 2005    insulin lispro (HUMALOG) injection, , SubCUTAneous, AC&HS, Suzi Benavidez MD, 4 Units at 02/14/23 0818    glucose chewable tablet 16 g, 4 Tablet, Oral, PRN, Dimas Benavidez MD    glucagon (GLUCAGEN) injection 1 mg, 1 mg, IntraMUSCular, PRN, Maddison Blackburn MD    0.9% sodium chloride infusion, 75 mL/hr, IntraVENous, CONTINUOUS, Suzi Benavidez MD, Last Rate: 75 mL/hr at 02/13/23 2238, 75 mL/hr at 02/13/23 2238    acetaminophen (TYLENOL) tablet 650 mg, 650 mg, Oral, Q6H PRN, 650 mg at 02/14/23 0439 **OR** acetaminophen (TYLENOL) suppository 650 mg, 650 mg, Rectal, Q6H PRN, Suzi Benavidez MD    polyethylene glycol (MIRALAX) packet 17 g, 17 g, Oral, DAILY PRN, Suzi Benavidez MD    ondansetron (ZOFRAN ODT) tablet 4 mg, 4 mg, Oral, Q8H PRN **OR** ondansetron (ZOFRAN) injection 4 mg, 4 mg, IntraVENous, Q6H PRN, Suzi Benavidez MD    enoxaparin (LOVENOX) injection 40 mg, 40 mg, SubCUTAneous, DAILY, Suzi Benavidez MD, 40 mg at 02/14/23 0818    albuterol-ipratropium (DUO-NEB) 2.5 MG-0.5 MG/3 ML, 3 mL, Nebulization, Q6H RT, Suzi Benavidez MD, 3 mL at 02/14/23 0923    budesonide-formoteroL (SYMBICORT) 160-4.5 mcg/actuation HFA inhaler 2 Puff, 2 Puff, Inhalation, BID, Suzi Benavidez MD, 2 Puff at 02/14/23 0923    piperacillin-tazobactam (ZOSYN) 3.375 g in 0.9% sodium chloride (MBP/ADV) 100 mL MBP, 3.375 g, IntraVENous, Q8H, Suzi Benavidez MD, Last Rate: 25 mL/hr at 02/14/23 0448, 3.375 g at 02/14/23 0448

## 2023-02-14 NOTE — ED NOTES
Assumed care of patient. Bedside shift report received from [facility]. Patient resting on stretcher. No acute distress noted. Connected to continuous cardiac and SpO2 monitor. Purewick in place.

## 2023-02-14 NOTE — PROGRESS NOTES
General Daily Progress Note          Patient Name:   Poly Walker       YOB: 1960       Age:  60 y.o. Admit Date: 2/13/2023      Subjective:     CHIEF COMPLAINT:      Shortness of breath     HISTORY OF PRESENT ILLNESS:        Patient is a 60y.o. year old female with a past medical history of type 2 diabetes, hypertension, history of lung cancer status post chemoradiation on home oxygen PRN who presented to emergency room complaining of shortness of breath with associated decreased appetite for last 4 days. In the ED, pt had a temperature of 102. 1. Her oxygen saturation was 75% initially. DuoNeb was given by EMS and her saturation improved to 94%. CT scan of the chest done shows perihilar basilar opacity suggestive of airspace disease. Pt was negative for flu and COVID. She denies any fever, chills or chest pain. CTA Chest shows  1. No visualized pulmonary embolus. 2. Perihilar and bibasilar opacity suggesting airspace disease. 3. Left basilar mass. 2/14  She notes that the lightheadedness and shortness of breath that she had upon presentation has resolved. Pt mentions that she had a recently diagnosis of DM. Pt's urine had >1000 mg/dL of glucose and her potassium is 3.4. Pt was found to have gram negative PNA.       Objective:     Visit Vitals  BP (!) 143/68 (BP 1 Location: Left upper arm, BP Patient Position: Semi fowlers)   Pulse 88   Temp 100.3 °F (37.9 °C)   Resp 20   Ht 5' 6\" (1.676 m)   Wt 93.4 kg (206 lb)   SpO2 98%   BMI 33.25 kg/m²        Recent Results (from the past 24 hour(s))   LACTIC ACID    Collection Time: 02/13/23  4:53 PM   Result Value Ref Range    Lactic acid 1.2 0.4 - 2.0 mmol/L   METABOLIC PANEL, COMPREHENSIVE    Collection Time: 02/13/23  4:53 PM   Result Value Ref Range    Sodium 126 (L) 136 - 145 mmol/L    Potassium 3.4 (L) 3.5 - 5.1 mmol/L    Chloride 89 (L) 97 - 108 mmol/L    CO2 28 21 - 32 mmol/L    Anion gap 9 5 - 15 mmol/L    Glucose 209 (H) 65 - 100 mg/dL    BUN 14 6 - 20 mg/dL    Creatinine 0.97 0.55 - 1.02 mg/dL    BUN/Creatinine ratio 14 12 - 20      eGFR >60 >60 ml/min/1.73m2    Calcium 9.2 8.5 - 10.1 mg/dL    Bilirubin, total 0.6 0.2 - 1.0 mg/dL    AST (SGOT) 33 15 - 37 U/L    ALT (SGPT) 38 12 - 78 U/L    Alk. phosphatase 68 45 - 117 U/L    Protein, total 8.2 6.4 - 8.2 g/dL    Albumin 3.3 (L) 3.5 - 5.0 g/dL    Globulin 4.9 (H) 2.0 - 4.0 g/dL    A-G Ratio 0.7 (L) 1.1 - 2.2     CBC WITH AUTOMATED DIFF    Collection Time: 02/13/23  4:53 PM   Result Value Ref Range    WBC 2.6 (L) 3.6 - 11.0 K/uL    RBC 4.34 3.80 - 5.20 M/uL    HGB 12.5 11.5 - 16.0 g/dL    HCT 37.6 35.0 - 47.0 %    MCV 86.6 80.0 - 99.0 FL    MCH 28.8 26.0 - 34.0 PG    MCHC 33.2 30.0 - 36.5 g/dL    RDW 13.6 11.5 - 14.5 %    PLATELET 275 920 - 872 K/uL    MPV 10.4 8.9 - 12.9 FL    NRBC 0.0 0.0  WBC    ABSOLUTE NRBC 0.00 0.00 - 0.01 K/uL    NEUTROPHILS 77 (H) 32 - 75 %    LYMPHOCYTES 18 12 - 49 %    MONOCYTES 3 (L) 5 - 13 %    EOSINOPHILS 0 0 - 7 %    BASOPHILS 1 0 - 1 %    IMMATURE GRANULOCYTES 1 (H) 0 - 0.5 %    ABS. NEUTROPHILS 2.1 1.8 - 8.0 K/UL    ABS. LYMPHOCYTES 0.5 (L) 0.8 - 3.5 K/UL    ABS. MONOCYTES 0.1 0.0 - 1.0 K/UL    ABS. EOSINOPHILS 0.0 0.0 - 0.4 K/UL    ABS. BASOPHILS 0.0 0.0 - 0.1 K/UL    ABS. IMM.  GRANS. 0.0 0.00 - 0.04 K/UL    DF AUTOMATED     COVID-19 RAPID TEST    Collection Time: 02/13/23  4:53 PM   Result Value Ref Range    COVID-19 rapid test Not Detected Not Detected     INFLUENZA A & B AG (RAPID TEST)    Collection Time: 02/13/23  4:53 PM   Result Value Ref Range    Influenza A Antigen Negative Negative      Influenza B Antigen Negative Negative     HEMOGLOBIN A1C WITH EAG    Collection Time: 02/13/23  4:53 PM   Result Value Ref Range    Hemoglobin A1c 8.1 (H) 4.0 - 5.6 %    Est. average glucose 186 mg/dL   BLOOD GAS, ARTERIAL    Collection Time: 02/13/23  6:36 PM   Result Value Ref Range    pH 7.47 (H) 7.35 - 7.45      PCO2 39 35 - 45 mmHg    PO2 70 (L) 80 - 100 mmHg    BICARBONATE 27 (H) 22 - 26 mmol/L    BASE EXCESS 3.5 (H) 0 - 3 mmol/L    O2 METHOD Nasal Cannula      O2 FLOW RATE 4.00 L/min    FIO2 36.0 %    Sample source Arterial      SITE Right Radial      JUANY'S TEST YES      Carboxy-Hgb PENDING %    Oxyhemoglobin PENDING %    Performed by Sakina Ibarra     TEMPERATURE 98.6     GLUCOSE, POC    Collection Time: 02/13/23 10:31 PM   Result Value Ref Range    Glucose (POC) 195 (H) 65 - 100 mg/dL    Performed by [address] [place] [address], ARTERIAL    Collection Time: 02/13/23 10:38 PM   Result Value Ref Range    pH 7.43 7.35 - 7.45      PCO2 46 (H) 35 - 45 mmHg    PO2 54 (L) 80 - 100 mmHg    BICARBONATE 30 (H) 22 - 26 mmol/L    BASE EXCESS 4.9 (H) 0 - 3 mmol/L    O2 METHOD Nasal Cannula      O2 FLOW RATE 4.00 L/min    Sample source Arterial      SITE Right Radial      JUANY'S TEST YES      Carboxy-Hgb PENDING %    Oxyhemoglobin PENDING %    Performed by Og Cedars Minor     TEMPERATURE 98.3     URINALYSIS W/ RFLX MICROSCOPIC    Collection Time: 02/13/23 11:19 PM   Result Value Ref Range    Color Yellow/Straw      Appearance Clear Clear      Specific gravity 1.022 1.003 - 1.030      pH (UA) 5.0      Protein Negative Negative mg/dL    Glucose >1,000 (A) Negative mg/dL    Ketone Negative Negative mg/dL    Bilirubin Negative Negative      Blood Negative Negative      Urobilinogen 0.1 (L) 0.2 - 1.0 EU/dL    Nitrites Negative Negative      Leukocyte Esterase Negative Negative     GLUCOSE, POC    Collection Time: 02/14/23  7:22 AM   Result Value Ref Range    Glucose (POC) 244 (H) 65 - 100 mg/dL    Performed by Wicho Schmidt    LACTIC ACID    Collection Time: 02/14/23  9:05 AM   Result Value Ref Range    Lactic acid 1.1 0.4 - 2.0 mmol/L   METABOLIC PANEL, COMPREHENSIVE    Collection Time: 02/14/23  9:05 AM   Result Value Ref Range    Sodium 133 (L) 136 - 145 mmol/L    Potassium 3.4 (L) 3.5 - 5.1 mmol/L    Chloride 100 97 - 108 mmol/L    CO2 29 21 - 32 mmol/L    Anion gap 4 (L) 5 - 15 mmol/L    Glucose 211 (H) 65 - 100 mg/dL    BUN 9 6 - 20 mg/dL    Creatinine 0.93 0.55 - 1.02 mg/dL    BUN/Creatinine ratio 10 (L) 12 - 20      eGFR >60 >60 ml/min/1.73m2    Calcium 8.6 8.5 - 10.1 mg/dL    Bilirubin, total 0.4 0.2 - 1.0 mg/dL    AST (SGOT) 55 (H) 15 - 37 U/L    ALT (SGPT) 51 12 - 78 U/L    Alk. phosphatase 59 45 - 117 U/L    Protein, total 7.7 6.4 - 8.2 g/dL    Albumin 2.8 (L) 3.5 - 5.0 g/dL    Globulin 4.9 (H) 2.0 - 4.0 g/dL    A-G Ratio 0.6 (L) 1.1 - 2.2     GLUCOSE, POC    Collection Time: 02/14/23 11:22 AM   Result Value Ref Range    Glucose (POC) 109 (H) 65 - 100 mg/dL    Performed by Mini Ornelas        Review of Systems    Constitutional: Negative for chills and fever. HENT: Negative. Eyes: Negative. Respiratory: Shortness of breath. Cardiovascular: Negative. Gastrointestinal: Negative for abdominal pain and nausea. Skin: Negative. Neurological: Negative. Physical Exam:      Constitutional: pt is oriented to person, place, and time. HENT:   Head: Normocephalic and atraumatic. Eyes: Pupils are equal, round, and reactive to light. EOM are normal.   Cardiovascular: Normal rate, regular rhythm and normal heart sounds. Pulmonary/Chest: Moderate wheezing diffusely. Abdominal: Soft. Bowel sounds are normal. There is no abdominal tenderness. There is no rebound and no guarding. Musculoskeletal: Normal range of motion. Neurological: pt is alert and oriented to person, place, and time. CTA CHEST W OR W WO CONT   Final Result   Respiratory motion limits evaluation   1. No visualized pulmonary embolus. 2. Perihilar and bibasilar opacity suggesting airspace disease. 3. Left basilar mass. 4. Incidental findings as above      XR CHEST PORT   Final Result   1. Bilateral pleural effusions with associated passive atelectasis and/or   consolidation. 2. Bibasilar opacities most suggestive of scarring/atelectasis.  Developing   airspace disease cannot be excluded                  Recent Results (from the past 24 hour(s))   LACTIC ACID    Collection Time: 02/13/23  4:53 PM   Result Value Ref Range    Lactic acid 1.2 0.4 - 2.0 mmol/L   METABOLIC PANEL, COMPREHENSIVE    Collection Time: 02/13/23  4:53 PM   Result Value Ref Range    Sodium 126 (L) 136 - 145 mmol/L    Potassium 3.4 (L) 3.5 - 5.1 mmol/L    Chloride 89 (L) 97 - 108 mmol/L    CO2 28 21 - 32 mmol/L    Anion gap 9 5 - 15 mmol/L    Glucose 209 (H) 65 - 100 mg/dL    BUN 14 6 - 20 mg/dL    Creatinine 0.97 0.55 - 1.02 mg/dL    BUN/Creatinine ratio 14 12 - 20      eGFR >60 >60 ml/min/1.73m2    Calcium 9.2 8.5 - 10.1 mg/dL    Bilirubin, total 0.6 0.2 - 1.0 mg/dL    AST (SGOT) 33 15 - 37 U/L    ALT (SGPT) 38 12 - 78 U/L    Alk. phosphatase 68 45 - 117 U/L    Protein, total 8.2 6.4 - 8.2 g/dL    Albumin 3.3 (L) 3.5 - 5.0 g/dL    Globulin 4.9 (H) 2.0 - 4.0 g/dL    A-G Ratio 0.7 (L) 1.1 - 2.2     CBC WITH AUTOMATED DIFF    Collection Time: 02/13/23  4:53 PM   Result Value Ref Range    WBC 2.6 (L) 3.6 - 11.0 K/uL    RBC 4.34 3.80 - 5.20 M/uL    HGB 12.5 11.5 - 16.0 g/dL    HCT 37.6 35.0 - 47.0 %    MCV 86.6 80.0 - 99.0 FL    MCH 28.8 26.0 - 34.0 PG    MCHC 33.2 30.0 - 36.5 g/dL    RDW 13.6 11.5 - 14.5 %    PLATELET 934 667 - 024 K/uL    MPV 10.4 8.9 - 12.9 FL    NRBC 0.0 0.0  WBC    ABSOLUTE NRBC 0.00 0.00 - 0.01 K/uL    NEUTROPHILS 77 (H) 32 - 75 %    LYMPHOCYTES 18 12 - 49 %    MONOCYTES 3 (L) 5 - 13 %    EOSINOPHILS 0 0 - 7 %    BASOPHILS 1 0 - 1 %    IMMATURE GRANULOCYTES 1 (H) 0 - 0.5 %    ABS. NEUTROPHILS 2.1 1.8 - 8.0 K/UL    ABS. LYMPHOCYTES 0.5 (L) 0.8 - 3.5 K/UL    ABS. MONOCYTES 0.1 0.0 - 1.0 K/UL    ABS. EOSINOPHILS 0.0 0.0 - 0.4 K/UL    ABS. BASOPHILS 0.0 0.0 - 0.1 K/UL    ABS. IMM.  GRANS. 0.0 0.00 - 0.04 K/UL    DF AUTOMATED     COVID-19 RAPID TEST    Collection Time: 02/13/23  4:53 PM   Result Value Ref Range    COVID-19 rapid test Not Detected Not Detected     INFLUENZA A & B AG (RAPID TEST) Collection Time: 02/13/23  4:53 PM   Result Value Ref Range    Influenza A Antigen Negative Negative      Influenza B Antigen Negative Negative     HEMOGLOBIN A1C WITH EAG    Collection Time: 02/13/23  4:53 PM   Result Value Ref Range    Hemoglobin A1c 8.1 (H) 4.0 - 5.6 %    Est. average glucose 186 mg/dL   BLOOD GAS, ARTERIAL    Collection Time: 02/13/23  6:36 PM   Result Value Ref Range    pH 7.47 (H) 7.35 - 7.45      PCO2 39 35 - 45 mmHg    PO2 70 (L) 80 - 100 mmHg    BICARBONATE 27 (H) 22 - 26 mmol/L    BASE EXCESS 3.5 (H) 0 - 3 mmol/L    O2 METHOD Nasal Cannula      O2 FLOW RATE 4.00 L/min    FIO2 36.0 %    Sample source Arterial      SITE Right Radial      JUANY'S TEST YES      Carboxy-Hgb PENDING %    Oxyhemoglobin PENDING %    Performed by Nica Garg     TEMPERATURE 98.6     GLUCOSE, POC    Collection Time: 02/13/23 10:31 PM   Result Value Ref Range    Glucose (POC) 195 (H) 65 - 100 mg/dL    Performed by [address] [place] [address], ARTERIAL    Collection Time: 02/13/23 10:38 PM   Result Value Ref Range    pH 7.43 7.35 - 7.45      PCO2 46 (H) 35 - 45 mmHg    PO2 54 (L) 80 - 100 mmHg    BICARBONATE 30 (H) 22 - 26 mmol/L    BASE EXCESS 4.9 (H) 0 - 3 mmol/L    O2 METHOD Nasal Cannula      O2 FLOW RATE 4.00 L/min    Sample source Arterial      SITE Right Radial      JUANY'S TEST YES      Carboxy-Hgb PENDING %    Oxyhemoglobin PENDING %    Performed by Hosea Fabry Minor     TEMPERATURE 98.3     URINALYSIS W/ RFLX MICROSCOPIC    Collection Time: 02/13/23 11:19 PM   Result Value Ref Range    Color Yellow/Straw      Appearance Clear Clear      Specific gravity 1.022 1.003 - 1.030      pH (UA) 5.0      Protein Negative Negative mg/dL    Glucose >1,000 (A) Negative mg/dL    Ketone Negative Negative mg/dL    Bilirubin Negative Negative      Blood Negative Negative      Urobilinogen 0.1 (L) 0.2 - 1.0 EU/dL    Nitrites Negative Negative      Leukocyte Esterase Negative Negative     GLUCOSE, POC    Collection Time: 02/14/23  7:22 AM   Result Value Ref Range    Glucose (POC) 244 (H) 65 - 100 mg/dL    Performed by Mikaela Rao    LACTIC ACID    Collection Time: 02/14/23  9:05 AM   Result Value Ref Range    Lactic acid 1.1 0.4 - 2.0 mmol/L   METABOLIC PANEL, COMPREHENSIVE    Collection Time: 02/14/23  9:05 AM   Result Value Ref Range    Sodium 133 (L) 136 - 145 mmol/L    Potassium 3.4 (L) 3.5 - 5.1 mmol/L    Chloride 100 97 - 108 mmol/L    CO2 29 21 - 32 mmol/L    Anion gap 4 (L) 5 - 15 mmol/L    Glucose 211 (H) 65 - 100 mg/dL    BUN 9 6 - 20 mg/dL    Creatinine 0.93 0.55 - 1.02 mg/dL    BUN/Creatinine ratio 10 (L) 12 - 20      eGFR >60 >60 ml/min/1.73m2    Calcium 8.6 8.5 - 10.1 mg/dL    Bilirubin, total 0.4 0.2 - 1.0 mg/dL    AST (SGOT) 55 (H) 15 - 37 U/L    ALT (SGPT) 51 12 - 78 U/L    Alk. phosphatase 59 45 - 117 U/L    Protein, total 7.7 6.4 - 8.2 g/dL    Albumin 2.8 (L) 3.5 - 5.0 g/dL    Globulin 4.9 (H) 2.0 - 4.0 g/dL    A-G Ratio 0.6 (L) 1.1 - 2.2     GLUCOSE, POC    Collection Time: 02/14/23 11:22 AM   Result Value Ref Range    Glucose (POC) 109 (H) 65 - 100 mg/dL    Performed by Mikaela Rao        Results       Procedure Component Value Units Date/Time    CULTURE, BLOOD #1 [[id number]]     Order Status: Canceled Specimen: Blood     CULTURE, BLOOD #2 [[id number]]     Order Status: Canceled Specimen: Blood     CULTURE, BLOOD [[id number]] Collected: 02/13/23 1653    Order Status: Sent Specimen: Blood Updated: 02/13/23 1708    CULTURE, BLOOD [[id number]] Collected: 02/13/23 1653    Order Status: Sent Specimen: Blood Updated: 02/13/23 1709    COVID-19 RAPID TEST [[id number]] Collected: 02/13/23 1653    Order Status: Completed Specimen: Nasopharyngeal Updated: 02/13/23 1741     COVID-19 rapid test Not Detected        Comment: Rapid [employer] ID Now   The specimen is NEGATIVE for SARS-CoV2, the novel coronavirus associated with COVID-19. A negative result does not rule out COVID-19.    This test has been authorized by the FDA under an Emergency Use Authorization (EUA) for use by authorized laboratories. Fact sheet for Healthcare Providers:  http://www.naif.juaquin/ Fact sheet for Patients: http://www.naif.juaquin/   Methodology: Isothermal Nucleic Acid Amplification       INFLUENZA A & B AG (RAPID TEST) [[id number]] Collected: 02/13/23 1653    Order Status: Canceled Specimen: Nasopharyngeal from Nasal washing     INFLUENZA A & B AG (RAPID TEST) [[id number]] Collected: 02/13/23 1653    Order Status: Completed Specimen: Nasal washing Updated: 02/13/23 1748     Influenza A Antigen Negative        Influenza B Antigen Negative                Labs:     Recent Labs     02/13/23 1653   WBC 2.6*   HGB 12.5   HCT 37.6          Recent Labs     02/14/23  0905 02/13/23 1653   * 126*   K 3.4* 3.4*    89*   CO2 29 28   BUN 9 14   CREA 0.93 0.97   * 209*   CA 8.6 9.2       Recent Labs     02/14/23  0905 02/13/23 1653   ALT 51 38   AP 59 68   TBILI 0.4 0.6   TP 7.7 8.2   ALB 2.8* 3.3*   GLOB 4.9* 4.9*       No results for input(s): INR, PTP, APTT, INREXT, INREXT in the last 72 hours. No results for input(s): FE, TIBC, PSAT, FERR in the last 72 hours. No results found for: FOL, RBCF   Recent Labs     02/13/23 2238 02/13/23  1836   PH 7.43 7.47*   PCO2 46* 39   PO2 54* 70*       No results for input(s): CPK, CKNDX, TROIQ in the last 72 hours.     No lab exists for component: CPKMB  No results found for: CHOL, CHOLX, CHLST, CHOLV, HDL, HDLP, LDL, LDLC, DLDLP, TGLX, TRIGL, TRIGP, CHHD, CHHDX  Lab Results   Component Value Date/Time    Glucose (POC) 109 (H) 02/14/2023 11:22 AM    Glucose (POC) 244 (H) 02/14/2023 07:22 AM    Glucose (POC) 195 (H) 02/13/2023 10:31 PM     Lab Results   Component Value Date/Time    Color Yellow/Straw 02/13/2023 11:19 PM    Appearance Clear 02/13/2023 11:19 PM    Specific gravity 1.022 02/13/2023 11:19 PM    pH (UA) 5.0 02/13/2023 11:19 PM    Protein Negative 02/13/2023 11:19 PM    Glucose >1,000 (A) 02/13/2023 11:19 PM    Ketone Negative 02/13/2023 11:19 PM    Bilirubin Negative 02/13/2023 11:19 PM    Urobilinogen 0.1 (L) 02/13/2023 11:19 PM    Nitrites Negative 02/13/2023 11:19 PM    Leukocyte Esterase Negative 02/13/2023 11:19 PM         Assessment:     Gram negative PNA  Levaquin 750 mg every 24 hours  3.375 g IV Zosyn every 8 hours     Acute hypoxic respiratory failure on 4L nasal cannula O2 sat 98%  Duo neb 3 mL every 6 hours  Symbicort 2 puffs BID      History of lung cancer  Heme/onc consult  Pulmonology consult     Type 2 diabetes  Humalog injection 4 times daily    Hypertension  Monitor blood pressure, may need to start on metoprolol    Hyponatremia  Sodium 133, pt receiving sodium chloride IV fluids    Hypokalemia  Replete potassium with 40 meq potassium      Plan:     Lovenox for DVT prophylaxis   Replace potassium with 40 mEq        Current Facility-Administered Medications:     methylPREDNISolone (PF) (SOLU-MEDROL) injection 40 mg, 40 mg, IntraVENous, Q8H, Javier Palomares MD    sodium chloride (NS) flush 5-10 mL, 5-10 mL, IntraVENous, PRN, Suzi Benavidez MD    levoFLOXacin (LEVAQUIN) 750 mg in D5W IVPB, 750 mg, IntraVENous, Q24H, Suzi Benavidez MD, IV Completed at 02/13/23 2005    insulin lispro (HUMALOG) injection, , SubCUTAneous, AC&HS, Suzi Benavidez MD, 4 Units at 02/14/23 0818    glucose chewable tablet 16 g, 4 Tablet, Oral, PRN, Suzi Benavidez MD    glucagon (GLUCAGEN) injection 1 mg, 1 mg, IntraMUSCular, PRN, Suzi Benavidez MD    0.9% sodium chloride infusion, 75 mL/hr, IntraVENous, CONTINUOUS, Suzi Benavidez MD, Last Rate: 75 mL/hr at 02/13/23 2238, 75 mL/hr at 02/13/23 2238    acetaminophen (TYLENOL) tablet 650 mg, 650 mg, Oral, Q6H PRN, 650 mg at 02/14/23 0439 **OR** acetaminophen (TYLENOL) suppository 650 mg, 650 mg, Rectal, Q6H PRN, Suzi Benavidez MD    polyethylene glycol (MIRALAX) packet 17 g, 17 g, Oral, DAILY PRN, Reema, Ethan Chaney MD    ondansetron (ZOFRAN ODT) tablet 4 mg, 4 mg, Oral, Q8H PRN **OR** ondansetron (ZOFRAN) injection 4 mg, 4 mg, IntraVENous, Q6H PRN, Suzi Benavidez MD    enoxaparin (LOVENOX) injection 40 mg, 40 mg, SubCUTAneous, DAILY, Suzi Benavidez MD, 40 mg at 02/14/23 0818    albuterol-ipratropium (DUO-NEB) 2.5 MG-0.5 MG/3 ML, 3 mL, Nebulization, Q6H RT, Suzi Benavidez MD, 3 mL at 02/14/23 0923    budesonide-formoteroL (SYMBICORT) 160-4.5 mcg/actuation HFA inhaler 2 Puff, 2 Puff, Inhalation, BID, Suzi Benavidez MD, 2 Puff at 02/14/23 0923    piperacillin-tazobactam (ZOSYN) 3.375 g in 0.9% sodium chloride (MBP/ADV) 100 mL MBP, 3.375 g, IntraVENous, Q8H, Suzi Benavidez MD, Last Rate: 25 mL/hr at 02/14/23 0448, 3.375 g at 02/14/23 0448

## 2023-02-15 LAB
ALBUMIN SERPL-MCNC: 2.4 G/DL (ref 3.5–5)
ALBUMIN/GLOB SERPL: 0.5 (ref 1.1–2.2)
ALP SERPL-CCNC: 50 U/L (ref 45–117)
ALT SERPL-CCNC: 86 U/L (ref 12–78)
ANION GAP SERPL CALC-SCNC: 5 MMOL/L (ref 5–15)
AST SERPL W P-5'-P-CCNC: 74 U/L (ref 15–37)
BILIRUB SERPL-MCNC: 0.2 MG/DL (ref 0.2–1)
BUN SERPL-MCNC: 9 MG/DL (ref 6–20)
BUN/CREAT SERPL: 13 (ref 12–20)
CA-I BLD-MCNC: 8.8 MG/DL (ref 8.5–10.1)
CHLORIDE SERPL-SCNC: 106 MMOL/L (ref 97–108)
CO2 SERPL-SCNC: 26 MMOL/L (ref 21–32)
CREAT SERPL-MCNC: 0.68 MG/DL (ref 0.55–1.02)
ERYTHROCYTE [DISTWIDTH] IN BLOOD BY AUTOMATED COUNT: 13.5 % (ref 11.5–14.5)
GLOBULIN SER CALC-MCNC: 4.6 G/DL (ref 2–4)
GLUCOSE BLD STRIP.AUTO-MCNC: 173 MG/DL (ref 65–100)
GLUCOSE BLD STRIP.AUTO-MCNC: 270 MG/DL (ref 65–100)
GLUCOSE BLD STRIP.AUTO-MCNC: 346 MG/DL (ref 65–100)
GLUCOSE BLD STRIP.AUTO-MCNC: 365 MG/DL (ref 65–100)
GLUCOSE SERPL-MCNC: 286 MG/DL (ref 65–100)
HCT VFR BLD AUTO: 31.2 % (ref 35–47)
HGB BLD-MCNC: 10 G/DL (ref 11.5–16)
MCH RBC QN AUTO: 28.2 PG (ref 26–34)
MCHC RBC AUTO-ENTMCNC: 32.1 G/DL (ref 30–36.5)
MCV RBC AUTO: 88.1 FL (ref 80–99)
NRBC # BLD: 0 K/UL (ref 0–0.01)
NRBC BLD-RTO: 0 PER 100 WBC
PERFORMED BY, TECHID: ABNORMAL
PLATELET # BLD AUTO: 158 K/UL (ref 150–400)
PMV BLD AUTO: 10.2 FL (ref 8.9–12.9)
POTASSIUM SERPL-SCNC: 3.6 MMOL/L (ref 3.5–5.1)
PROT SERPL-MCNC: 7 G/DL (ref 6.4–8.2)
RBC # BLD AUTO: 3.54 M/UL (ref 3.8–5.2)
SODIUM SERPL-SCNC: 137 MMOL/L (ref 136–145)
WBC # BLD AUTO: 2 K/UL (ref 3.6–11)

## 2023-02-15 PROCEDURE — 74011250636 HC RX REV CODE- 250/636: Performed by: FAMILY MEDICINE

## 2023-02-15 PROCEDURE — 74011000250 HC RX REV CODE- 250: Performed by: FAMILY MEDICINE

## 2023-02-15 PROCEDURE — 36415 COLL VENOUS BLD VENIPUNCTURE: CPT

## 2023-02-15 PROCEDURE — 85027 COMPLETE CBC AUTOMATED: CPT

## 2023-02-15 PROCEDURE — 65270000029 HC RM PRIVATE

## 2023-02-15 PROCEDURE — 94761 N-INVAS EAR/PLS OXIMETRY MLT: CPT

## 2023-02-15 PROCEDURE — 94640 AIRWAY INHALATION TREATMENT: CPT

## 2023-02-15 PROCEDURE — 74011636637 HC RX REV CODE- 636/637: Performed by: FAMILY MEDICINE

## 2023-02-15 PROCEDURE — 82962 GLUCOSE BLOOD TEST: CPT

## 2023-02-15 PROCEDURE — 80053 COMPREHEN METABOLIC PANEL: CPT

## 2023-02-15 PROCEDURE — 74011250637 HC RX REV CODE- 250/637: Performed by: FAMILY MEDICINE

## 2023-02-15 PROCEDURE — 77010033678 HC OXYGEN DAILY

## 2023-02-15 PROCEDURE — 74011250636 HC RX REV CODE- 250/636: Performed by: INTERNAL MEDICINE

## 2023-02-15 PROCEDURE — 74011000258 HC RX REV CODE- 258: Performed by: FAMILY MEDICINE

## 2023-02-15 RX ADMIN — IPRATROPIUM BROMIDE AND ALBUTEROL SULFATE 3 ML: 2.5; .5 SOLUTION RESPIRATORY (INHALATION) at 01:04

## 2023-02-15 RX ADMIN — BUDESONIDE AND FORMOTEROL FUMARATE DIHYDRATE 2 PUFF: 160; 4.5 AEROSOL RESPIRATORY (INHALATION) at 19:35

## 2023-02-15 RX ADMIN — BUDESONIDE AND FORMOTEROL FUMARATE DIHYDRATE 2 PUFF: 160; 4.5 AEROSOL RESPIRATORY (INHALATION) at 08:00

## 2023-02-15 RX ADMIN — IPRATROPIUM BROMIDE AND ALBUTEROL SULFATE 3 ML: 2.5; .5 SOLUTION RESPIRATORY (INHALATION) at 07:46

## 2023-02-15 RX ADMIN — METHYLPREDNISOLONE SODIUM SUCCINATE 40 MG: 40 INJECTION, POWDER, FOR SOLUTION INTRAMUSCULAR; INTRAVENOUS at 05:21

## 2023-02-15 RX ADMIN — LEVOFLOXACIN 750 MG: 5 INJECTION, SOLUTION INTRAVENOUS at 17:15

## 2023-02-15 RX ADMIN — ENOXAPARIN SODIUM 40 MG: 100 INJECTION SUBCUTANEOUS at 08:16

## 2023-02-15 RX ADMIN — BUDESONIDE AND FORMOTEROL FUMARATE DIHYDRATE 2 PUFF: 160; 4.5 AEROSOL RESPIRATORY (INHALATION) at 09:00

## 2023-02-15 RX ADMIN — PIPERACILLIN AND TAZOBACTAM 3.38 G: 3; .375 INJECTION, POWDER, FOR SOLUTION INTRAVENOUS at 12:05

## 2023-02-15 RX ADMIN — SODIUM CHLORIDE 75 ML/HR: 9 INJECTION, SOLUTION INTRAVENOUS at 08:18

## 2023-02-15 RX ADMIN — IPRATROPIUM BROMIDE AND ALBUTEROL SULFATE 3 ML: 2.5; .5 SOLUTION RESPIRATORY (INHALATION) at 19:34

## 2023-02-15 RX ADMIN — PIPERACILLIN AND TAZOBACTAM 3.38 G: 3; .375 INJECTION, POWDER, FOR SOLUTION INTRAVENOUS at 05:25

## 2023-02-15 RX ADMIN — INSULIN LISPRO 10 UNITS: 100 INJECTION, SOLUTION INTRAVENOUS; SUBCUTANEOUS at 12:05

## 2023-02-15 RX ADMIN — INSULIN LISPRO 7 UNITS: 100 INJECTION, SOLUTION INTRAVENOUS; SUBCUTANEOUS at 08:16

## 2023-02-15 RX ADMIN — PIPERACILLIN AND TAZOBACTAM 3.38 G: 3; .375 INJECTION, POWDER, FOR SOLUTION INTRAVENOUS at 21:17

## 2023-02-15 RX ADMIN — IPRATROPIUM BROMIDE AND ALBUTEROL SULFATE 3 ML: 2.5; .5 SOLUTION RESPIRATORY (INHALATION) at 13:52

## 2023-02-15 RX ADMIN — INSULIN LISPRO 10 UNITS: 100 INJECTION, SOLUTION INTRAVENOUS; SUBCUTANEOUS at 17:15

## 2023-02-15 NOTE — CONSULTS
PULMONARY NOTE  [facility]    Name: Joann Glover MRN: [id number]   : 1960 Hospital: [facility]   Date: 2/15/2023  Admission date: 2023 Hospital Day: 3       HPI:     Hospital Problems  Never Reviewed            Codes Class Noted POA    Respiratory failure with hypoxia [facility]) ICD-10-CM: J96.91  ICD-9-CM: 518.81  2023 Unknown        Pneumonia ICD-10-CM: J18.9  ICD-9-CM: 408  2023 Unknown              [x] High complexity decision making was performed  [x] See my orders for details      Subjective/Initial History:     I was asked by Bere Kline MD to see Joann Glover  a 60 y.o.    female in consultation     Excerpts from admission 2023 or consult notes as follows:   79-year-old lady came to hospital because of shortness of breath and dyspnea, she was seen at the oncology office recommend to come to the hospital she is chronically on home oxygen history of lung cancer status post chemoradiation treatment she was short of breath came to the hospital now she is on 4 L nasal cannula she was also febrile temperature of 102 CAT scan of the chest was done which shows left basilar mass and perihilar infiltrate possible atelectasis she is weak and tired so admitted and pulmonary consult was called, she is complaining about diarrhea      Allergies   Allergen Reactions    Lisinopril Unknown (comments)        MAR reviewed and pertinent medications noted or modified as needed     Current Facility-Administered Medications   Medication    sodium chloride (NS) flush 5-10 mL    levoFLOXacin (LEVAQUIN) 750 mg in D5W IVPB    insulin lispro (HUMALOG) injection    glucose chewable tablet 16 g    glucagon (GLUCAGEN) injection 1 mg    0.9% sodium chloride infusion    acetaminophen (TYLENOL) tablet 650 mg    Or    acetaminophen (TYLENOL) suppository 650 mg    polyethylene glycol (MIRALAX) packet 17 g    ondansetron (ZOFRAN ODT) tablet 4 mg    Or    ondansetron (ZOFRAN) injection 4 mg    enoxaparin (LOVENOX) injection 40 mg    albuterol-ipratropium (DUO-NEB) 2.5 MG-0.5 MG/3 ML    budesonide-formoteroL (SYMBICORT) 160-4.5 mcg/actuation HFA inhaler 2 Puff    piperacillin-tazobactam (ZOSYN) 3.375 g in 0.9% sodium chloride (MBP/ADV) 100 mL MBP      Patient PCP: Park, MD Shonna  PMH:  has no past medical history on file. PSH:   has no past surgical history on file. FHX: family history is not on file. SHX:       ROS:    Review of system as per HPI and physical exam    Objective:     Vital Signs: Telemetry:    normal sinus rhythm Intake/Output:   Visit Vitals  /70 (BP 1 Location: Left upper arm, BP Patient Position: Lying)   Pulse 70   Temp 98.4 °F (36.9 °C)   Resp 20   Ht 5' 6\" (1.676 m)   Wt 93.4 kg (206 lb)   SpO2 94%   BMI 33.25 kg/m²       Temp (24hrs), Av.8 °F (37.1 °C), Min:98 °F (36.7 °C), Max:100.3 °F (37.9 °C)        O2 Device: Nasal cannula O2 Flow Rate (L/min): 4 l/min       Wt Readings from Last 4 Encounters:   23 93.4 kg (206 lb)          Intake/Output Summary (Last 24 hours) at 2/15/2023 1041  Last data filed at 2/15/2023 0912  Gross per 24 hour   Intake 240 ml   Output --   Net 240 ml         Last shift:      02/15 0701 - 02/15 1900  In: 240 [P.O.:240]  Out: -   Last 3 shifts: 1901 - 02/15 0700  In: 150 [I.V.:150]  Out: 750 [Urine:750]       Physical Exam:     Physical Exam  Constitutional:       Appearance: She is obese. HENT:      Head: Normocephalic and atraumatic. Nose: Nose normal.      Mouth/Throat:      Mouth: Mucous membranes are moist.   Cardiovascular:      Rate and Rhythm: Normal rate and regular rhythm. Pulses: Normal pulses. Heart sounds: Normal heart sounds. Pulmonary:      Effort: Pulmonary effort is normal.      Breath sounds: Rhonchi and rales present. Abdominal:      General: Abdomen is flat. Bowel sounds are normal.      Palpations: Abdomen is soft.    Musculoskeletal:         General: Normal range of motion. Cervical back: Normal range of motion and neck supple. Skin:     General: Skin is warm. Neurological:      General: No focal deficit present. Mental Status: She is alert. Labs:    Recent Labs     02/15/23  0726 02/13/23  1653   WBC 2.0* 2.6*   HGB 10.0* 12.5    196       Recent Labs     02/15/23  0726 02/14/23  0905 02/13/23  1653    133* 126*   K 3.6 3.4* 3.4*    100 89*   CO2 26 29 28   * 211* 209*   BUN 9 9 14   CREA 0.68 0.93 0.97   CA 8.8 8.6 9.2   LAC  --  1.1 1.2   ALB 2.4* 2.8* 3.3*   ALT 86* 51 38       Recent Labs     02/13/23  2238 02/13/23  1836   PH 7.43 7.47*   PCO2 46* 39   PO2 54* 70*   HCO3 30* 27*   FIO2  --  36.0       No results for input(s): CPK, CKNDX, TROIQ in the last 72 hours. No lab exists for component: CPKMB  No results found for: BNPP, BNP   Lab Results   Component Value Date/Time    Culture result: No growth after 5 hours 02/13/2023 04:53 PM    Culture result: No growth after 5 hours 02/13/2023 04:53 PM   No results found for: TSH, TSHEXT, TSHEXT    Imaging:    CXR Results  (Last 48 hours)                 02/13/23 1718  XR CHEST PORT Final result    Impression:  1. Bilateral pleural effusions with associated passive atelectasis and/or   consolidation. 2. Bibasilar opacities most suggestive of scarring/atelectasis. Developing   airspace disease cannot be excluded               Narrative:  INDICATION: . Sepsis   Additional history:   COMPARISON: None. LIMITATIONS: Portable technique. Sherry Waldrop FINDINGS: Single frontal view of the chest.    .   Lines/tubes/surgical: There is a port in the right chest with a subclavian   approach catheter that projects to terminate in the mid SVC. Cardiac monitor   leads overly the patient   Heart/mediastinum: Calcifications in the aortic arch. .    Lungs/pleura: Hazy opacity over the midlungs and bases with partially obscured   hemidiaphragms.  Minimal linear opacities in both bases suggesting scarring/atelectasis. No visible pneumothorax. Additional Comments: None. .             Results from [place] encounter on 02/13/23    XR CHEST PORT    Narrative  INDICATION: . Sepsis  Additional history:  COMPARISON: None. LIMITATIONS: Portable technique. Vienna Lager FINDINGS: Single frontal view of the chest.  .  Lines/tubes/surgical: There is a port in the right chest with a subclavian  approach catheter that projects to terminate in the mid SVC. Cardiac monitor  leads overly the patient  Heart/mediastinum: Calcifications in the aortic arch. .  Lungs/pleura: Hazy opacity over the midlungs and bases with partially obscured  hemidiaphragms. Minimal linear opacities in both bases suggesting  scarring/atelectasis. No visible pneumothorax. Additional Comments: None. .    Impression  1. Bilateral pleural effusions with associated passive atelectasis and/or  consolidation. 2. Bibasilar opacities most suggestive of scarring/atelectasis. Developing  airspace disease cannot be excluded    Results from Hospital Encounter encounter on 02/13/23    CTA CHEST W OR W WO CONT    Narrative  EXAM:  CTA CHEST W OR W WO CONT  INDICATION:  hypoxia, hx Cancer. Additional history:  COMPARISON: Chest x-ray, 2/13/2023. Fernanda Lager TECHNIQUE:  Precontrast  images were obtained to localize the volume for acquisition. Multislice helical CT arteriography was performed from the diaphragm to the  thoracic inlet during uneventful rapid bolus intravenous contrast  administration. Lung and soft tissue windows were generated. Coronal and  sagittal images were generated and 3D post processing consisting of coronal  maximum intensity images was performed. CT dose reduction was achieved through use of a standardized protocol tailored  for this examination and automatic exposure control for dose modulation. Vienna Lager FINDINGS:  CHEST:  Chest wall/thoracic inlet: Within normal limits. Thyroid: Within normal limits.   Mediastinum/manolo: Bilateral hilar adenopathy. Subcarinal adenopathy Calcified  lymph nodes in the right hilum and in the mediastinum. Patulous esophagus. Heart/vessels: There is a port in the right chest with a right subclavian  approach catheter that terminates in the mid SVC. Coronary artery  calcifications: Not able to be assessed  Lungs/Pleura: Respiratory motion limiting visualization. Mass in the left lower  lobe at the left hemidiaphragm laterally measuring 2.7 x 3.9 x 3 cm. Perihilar  opacities in both lungs. Bibasilar opacities. .  INCIDENTALLY IMAGED ABDOMEN:  Status post cholecystectomy  . MSK:  Minimal degenerative change in the mid thoracic spine. .    Impression  Respiratory motion limits evaluation  1. No visualized pulmonary embolus. 2. Perihilar and bibasilar opacity suggesting airspace disease. 3. Left basilar mass.   4. 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

## 2023-02-15 NOTE — PROGRESS NOTES
General Daily Progress Note          Patient Name:   Ryan Crandall       YOB: 1960       Age:  60 y.o. Admit Date: 2/13/2023      Subjective:     CHIEF COMPLAINT:      Shortness of breath     HISTORY OF PRESENT ILLNESS:        Patient is a 60y.o. year old female with a past medical history of type 2 diabetes, hypertension, history of lung cancer status post chemoradiation on home oxygen PRN who presented to emergency room complaining of shortness of breath with associated decreased appetite for last 4 days. In the ED, pt had a temperature of 102. 1. Her oxygen saturation was 75% initially. DuoNeb was given by EMS and her saturation improved to 94%. CT scan of the chest done shows perihilar basilar opacity suggestive of airspace disease. Pt was negative for flu and COVID. She denies any fever, chills or chest pain. CTA Chest shows  1. No visualized pulmonary embolus. 2. Perihilar and bibasilar opacity suggesting airspace disease. 3. Left basilar mass. 2/14  She notes that the lightheadedness and shortness of breath that she had upon presentation has resolved. Pt mentions that she had a recently diagnosis of DM. Pt's urine had >1000 mg/dL of glucose and her potassium is 3.4. Pt was found to have gram negative PNA. 2/15  Pt reports that she feels dizzy today. She mentions that she has been having 3-4 episodes of diarrhea daily. She adds that she is having some abdominal pain but it is relieved with Tylenol. Pt notes that she has still been wheezing, but the nebulizer treatment is helping. She is on 3L nasal cannula and her O2 sat is 94%. She was seen by hematology yesterday. Pt has small cell lung cancer and was started on carboplatin, etoposide and atezolizumab on 2/6/23. Glucose 286 today.       Objective:     Visit Vitals  /70 (BP 1 Location: Left upper arm, BP Patient Position: Lying)   Pulse 70   Temp 98.4 °F (36.9 °C)   Resp 20   Ht 5' 6\" (1.676 m)   Wt 206 lb (93.4 kg) SpO2 94%   BMI 33.25 kg/m²        Recent Results (from the past 24 hour(s))   LACTIC ACID    Collection Time: 02/14/23  9:05 AM   Result Value Ref Range    Lactic acid 1.1 0.4 - 2.0 mmol/L   METABOLIC PANEL, COMPREHENSIVE    Collection Time: 02/14/23  9:05 AM   Result Value Ref Range    Sodium 133 (L) 136 - 145 mmol/L    Potassium 3.4 (L) 3.5 - 5.1 mmol/L    Chloride 100 97 - 108 mmol/L    CO2 29 21 - 32 mmol/L    Anion gap 4 (L) 5 - 15 mmol/L    Glucose 211 (H) 65 - 100 mg/dL    BUN 9 6 - 20 mg/dL    Creatinine 0.93 0.55 - 1.02 mg/dL    BUN/Creatinine ratio 10 (L) 12 - 20      eGFR >60 >60 ml/min/1.73m2    Calcium 8.6 8.5 - 10.1 mg/dL    Bilirubin, total 0.4 0.2 - 1.0 mg/dL    AST (SGOT) 55 (H) 15 - 37 U/L    ALT (SGPT) 51 12 - 78 U/L    Alk. phosphatase 59 45 - 117 U/L    Protein, total 7.7 6.4 - 8.2 g/dL    Albumin 2.8 (L) 3.5 - 5.0 g/dL    Globulin 4.9 (H) 2.0 - 4.0 g/dL    A-G Ratio 0.6 (L) 1.1 - 2.2     GLUCOSE, POC    Collection Time: 02/14/23 11:22 AM   Result Value Ref Range    Glucose (POC) 109 (H) 65 - 100 mg/dL    Performed by Pollo Toribio, POC    Collection Time: 02/14/23  4:46 PM   Result Value Ref Range    Glucose (POC) 220 (H) 65 - 100 mg/dL    Performed by [id number], POC    Collection Time: 02/14/23  8:34 PM   Result Value Ref Range    Glucose (POC) 298 (H) 65 - 100 mg/dL    Performed by Laya PANTOJA, POC    Collection Time: 02/15/23  7:25 AM   Result Value Ref Range    Glucose (POC) 270 (H) 65 - 100 mg/dL    Performed by Consuelo Atkins        Review of Systems    Constitutional: Negative for chills and fever. HENT: Negative. Eyes: Negative. Respiratory: Shortness of breath, wheezing. Cardiovascular: Negative. Gastrointestinal: Positive for abdominal pain and diarrhea. Skin: Negative. Neurological: Dizzy. Physical Exam:      Constitutional: pt is oriented to person, place, and time.    HENT: on nasal cannula  Head: Normocephalic and atraumatic. Eyes: Pupils are equal, round, and reactive to light. EOM are normal.   Cardiovascular: Normal rate, regular rhythm and normal heart sounds. Pulmonary/Chest: Moderate wheezing diffusely. Abdominal: Soft. Bowel sounds are normal. There is no abdominal tenderness. There is no rebound and no guarding. Musculoskeletal: Normal range of motion. Neurological: pt is alert and oriented to person, place, and time. CTA CHEST W OR W WO CONT   Final Result   Respiratory motion limits evaluation   1. No visualized pulmonary embolus. 2. Perihilar and bibasilar opacity suggesting airspace disease. 3. Left basilar mass. 4. Incidental findings as above      XR CHEST PORT   Final Result   1. Bilateral pleural effusions with associated passive atelectasis and/or   consolidation. 2. Bibasilar opacities most suggestive of scarring/atelectasis. Developing   airspace disease cannot be excluded                  Recent Results (from the past 24 hour(s))   LACTIC ACID    Collection Time: 02/14/23  9:05 AM   Result Value Ref Range    Lactic acid 1.1 0.4 - 2.0 mmol/L   METABOLIC PANEL, COMPREHENSIVE    Collection Time: 02/14/23  9:05 AM   Result Value Ref Range    Sodium 133 (L) 136 - 145 mmol/L    Potassium 3.4 (L) 3.5 - 5.1 mmol/L    Chloride 100 97 - 108 mmol/L    CO2 29 21 - 32 mmol/L    Anion gap 4 (L) 5 - 15 mmol/L    Glucose 211 (H) 65 - 100 mg/dL    BUN 9 6 - 20 mg/dL    Creatinine 0.93 0.55 - 1.02 mg/dL    BUN/Creatinine ratio 10 (L) 12 - 20      eGFR >60 >60 ml/min/1.73m2    Calcium 8.6 8.5 - 10.1 mg/dL    Bilirubin, total 0.4 0.2 - 1.0 mg/dL    AST (SGOT) 55 (H) 15 - 37 U/L    ALT (SGPT) 51 12 - 78 U/L    Alk.  phosphatase 59 45 - 117 U/L    Protein, total 7.7 6.4 - 8.2 g/dL    Albumin 2.8 (L) 3.5 - 5.0 g/dL    Globulin 4.9 (H) 2.0 - 4.0 g/dL    A-G Ratio 0.6 (L) 1.1 - 2.2     GLUCOSE, POC    Collection Time: 02/14/23 11:22 AM   Result Value Ref Range    Glucose (POC) 109 (H) 65 - 100 mg/dL Performed by Keith Walker, POC    Collection Time: 02/14/23  4:46 PM   Result Value Ref Range    Glucose (POC) 220 (H) 65 - 100 mg/dL    Performed by Mika Carreno, POC    Collection Time: 02/14/23  8:34 PM   Result Value Ref Range    Glucose (POC) 298 (H) 65 - 100 mg/dL    Performed by Poppy Patel    GLUCOSE, POC    Collection Time: 02/15/23  7:25 AM   Result Value Ref Range    Glucose (POC) 270 (H) 65 - 100 mg/dL    Performed by Angely Bailey        Results       Procedure Component Value Units Date/Time    CULTURE, BLOOD #1 [[id number]]     Order Status: Canceled Specimen: Blood     CULTURE, BLOOD #2 [[id number]]     Order Status: Canceled Specimen: Blood     CULTURE, BLOOD [[id number]] Collected: 02/13/23 1653    Order Status: Completed Specimen: Blood Updated: 02/14/23 1412     Special Requests: No Special Requests        Culture result: No growth after 5 hours       CULTURE, BLOOD [[id number]] Collected: 02/13/23 1653    Order Status: Completed Specimen: Blood Updated: 02/14/23 1412     Special Requests: No Special Requests        Culture result: No growth after 5 hours       COVID-19 RAPID TEST [[id number]] Collected: 02/13/23 1653    Order Status: Completed Specimen: Nasopharyngeal Updated: 02/13/23 1745     COVID-19 rapid test Not Detected        Comment: Rapid Abbott ID Now   The specimen is NEGATIVE for SARS-CoV2, the novel coronavirus associated with COVID-19. A negative result does not rule out COVID-19. This test has been authorized by the FDA under an Emergency Use Authorization (EUA) for use by authorized laboratories.    Fact sheet for Healthcare Providers:  http://www.marta-ingram.biz/ Fact sheet for Patients: http://www.lozano-ingram.biz/   Methodology: Isothermal Nucleic Acid Amplification       INFLUENZA A & B AG (RAPID TEST) [[id number]] Collected: 02/13/23 1653    Order Status: Canceled Specimen: Nasopharyngeal from Nasal washing     INFLUENZA A & B AG (RAPID TEST) [[id number]] Collected: 02/13/23 1653    Order Status: Completed Specimen: Nasal washing Updated: 02/13/23 1748     Influenza A Antigen Negative        Influenza B Antigen Negative                Labs:     Recent Labs     02/13/23  1653   WBC 2.6*   HGB 12.5   HCT 37.6          Recent Labs     02/14/23  0905 02/13/23  1653   * 126*   K 3.4* 3.4*    89*   CO2 29 28   BUN 9 14   CREA 0.93 0.97   * 209*   CA 8.6 9.2       Recent Labs     02/14/23  0905 02/13/23  1653   ALT 51 38   AP 59 68   TBILI 0.4 0.6   TP 7.7 8.2   ALB 2.8* 3.3*   GLOB 4.9* 4.9*       No results for input(s): INR, PTP, APTT, INREXT, INREXT in the last 72 hours. No results for input(s): FE, TIBC, PSAT, FERR in the last 72 hours. No results found for: FOL, RBCF   Recent Labs     02/13/23 2238 02/13/23  1836   PH 7.43 7.47*   PCO2 46* 39   PO2 54* 70*       No results for input(s): CPK, CKNDX, TROIQ in the last 72 hours.     No lab exists for component: CPKMB  No results found for: CHOL, CHOLX, CHLST, CHOLV, HDL, HDLP, LDL, LDLC, DLDLP, TGLX, TRIGL, TRIGP, CHHD, CHHDX  Lab Results   Component Value Date/Time    Glucose (POC) 270 (H) 02/15/2023 07:25 AM    Glucose (POC) 298 (H) 02/14/2023 08:34 PM    Glucose (POC) 220 (H) 02/14/2023 04:46 PM    Glucose (POC) 109 (H) 02/14/2023 11:22 AM    Glucose (POC) 244 (H) 02/14/2023 07:22 AM     Lab Results   Component Value Date/Time    Color Yellow/Straw 02/13/2023 11:19 PM    Appearance Clear 02/13/2023 11:19 PM    Specific gravity 1.022 02/13/2023 11:19 PM    pH (UA) 5.0 02/13/2023 11:19 PM    Protein Negative 02/13/2023 11:19 PM    Glucose >1,000 (A) 02/13/2023 11:19 PM    Ketone Negative 02/13/2023 11:19 PM    Bilirubin Negative 02/13/2023 11:19 PM    Urobilinogen 0.1 (L) 02/13/2023 11:19 PM    Nitrites Negative 02/13/2023 11:19 PM    Leukocyte Esterase Negative 02/13/2023 11:19 PM         Assessment:     Gram negative PNA  Levaquin 750 mg every 24 hours  3.375 g IV Zosyn every 8 hours     Acute hypoxic respiratory failure on 4L nasal cannula O2 sat 98%  Duo neb 3 mL every 6 hours  Symbicort 2 puffs BID    History of lung cancer  Pt has small cell lung cancer   Was started on carboplatin, etoposide and atezolizumab on 2/6/23.    Pulmonology consult     Type 2 diabetes  Humalog injection 4 times daily  Glucose 286 today    Diarrhea  Start on 4 mg imodium   Replete fluids    Hypertension (resolved)    Hyponatremia(resolved)    Hypokalemia (resolved)    Plan:     Lovenox for DVT prophylaxis         Current Facility-Administered Medications:     sodium chloride (NS) flush 5-10 mL, 5-10 mL, IntraVENous, PRN, Jesus Benavidez MD    levoFLOXacin (LEVAQUIN) 750 mg in D5W IVPB, 750 mg, IntraVENous, Q24H, Suzi Benavidez MD, Last Rate: 100 mL/hr at 02/14/23 1721, 750 mg at 02/14/23 1721    insulin lispro (HUMALOG) injection, , SubCUTAneous, AC&HS, Suzi Benavidez MD, 4 Units at 02/14/23 2046    glucose chewable tablet 16 g, 4 Tablet, Oral, PRN, Jesus Benavidez MD    glucagon (GLUCAGEN) injection 1 mg, 1 mg, IntraMUSCular, PRN, Micha Borden MD    0.9% sodium chloride infusion, 75 mL/hr, IntraVENous, CONTINUOUS, Suzi Benavidez MD, Last Rate: 75 mL/hr at 02/14/23 1240, 75 mL/hr at 02/14/23 1240    acetaminophen (TYLENOL) tablet 650 mg, 650 mg, Oral, Q6H PRN, 650 mg at 02/14/23 8263 **OR** acetaminophen (TYLENOL) suppository 650 mg, 650 mg, Rectal, Q6H PRN, Suzi Benavidez MD    polyethylene glycol (MIRALAX) packet 17 g, 17 g, Oral, DAILY PRN, Suzi Benavidez MD    ondansetron (ZOFRAN ODT) tablet 4 mg, 4 mg, Oral, Q8H PRN **OR** ondansetron (ZOFRAN) injection 4 mg, 4 mg, IntraVENous, Q6H PRN, Suzi Benavidez MD    enoxaparin (LOVENOX) injection 40 mg, 40 mg, SubCUTAneous, DAILY, Suzi Benavidez MD, 40 mg at 02/14/23 0818    albuterol-ipratropium (DUO-NEB) 2.5 MG-0.5 MG/3 ML, 3 mL, Nebulization, Q6H RT, Suzi Benavidez MD, 3 mL at 02/15/23 3471 budesonide-formoteroL (SYMBICORT) 160-4.5 mcg/actuation HFA inhaler 2 Puff, 2 Puff, Inhalation, BID, Suzi Benavidez MD, 2 Puff at 02/15/23 0900    piperacillin-tazobactam (ZOSYN) 3.375 g in 0.9% sodium chloride (MBP/ADV) 100 mL MBP, 3.375 g, IntraVENous, Q8H, Suzi Benavidez MD, Last Rate: 25 mL/hr at 02/15/23 0525, 3.375 g at 02/15/23 9359

## 2023-02-15 NOTE — PROGRESS NOTES
General Daily Progress Note          Patient Name:   Maria C Maldonado       YOB: 1960       Age:  60 y.o. Admit Date: 2/13/2023      Subjective:     CHIEF COMPLAINT:      Shortness of breath     HISTORY OF PRESENT ILLNESS:        Patient is a 60y.o. year old female with a past medical history of type 2 diabetes, hypertension, history of lung cancer status post chemoradiation on home oxygen PRN who presented to emergency room complaining of shortness of breath with associated decreased appetite for last 4 days. In the ED, pt had a temperature of 102. 1. Her oxygen saturation was 75% initially. DuoNeb was given by EMS and her saturation improved to 94%. CT scan of the chest done shows perihilar basilar opacity suggestive of airspace disease. Pt was negative for flu and COVID. She denies any fever, chills or chest pain. CTA Chest shows  1. No visualized pulmonary embolus. 2. Perihilar and bibasilar opacity suggesting airspace disease. 3. Left basilar mass. 2/14  She notes that the lightheadedness and shortness of breath that she had upon presentation has resolved. Pt mentions that she had a recently diagnosis of DM. Pt's urine had >1000 mg/dL of glucose and her potassium is 3.4. Pt was found to have gram negative PNA. 2/15  Pt reports that she feels dizzy today. She mentions that she has been having 3-4 episodes of diarrhea daily. She adds that she is having some abdominal pain but it is relieved with Tylenol. Pt notes that she has still been wheezing, but the nebulizer treatment is helping. She is on 3L nasal cannula and her O2 sat is 94%. She was seen by hematology yesterday. Pt has small cell lung cancer and was started on carboplatin, etoposide and atezolizumab on 2/6/23. Glucose 286 today.       Objective:     Visit Vitals  /66 (BP 1 Location: Left upper arm)   Pulse 80   Temp 98.3 °F (36.8 °C)   Resp 18   Ht 5' 6\" (1.676 m)   Wt 93.4 kg (206 lb)   SpO2 94%   BMI 33.25 kg/m²        Recent Results (from the past 24 hour(s))   GLUCOSE, POC    Collection Time: 02/14/23  4:46 PM   Result Value Ref Range    Glucose (POC) 220 (H) 65 - 100 mg/dL    Performed by Truong Bhat    GLUCOSE, POC    Collection Time: 02/14/23  8:34 PM   Result Value Ref Range    Glucose (POC) 298 (H) 65 - 100 mg/dL    Performed by Delmi Arreaga    GLUCOSE, POC    Collection Time: 02/15/23  7:25 AM   Result Value Ref Range    Glucose (POC) 270 (H) 65 - 100 mg/dL    Performed by Darien Atkinson    CBC W/O DIFF    Collection Time: 02/15/23  7:26 AM   Result Value Ref Range    WBC 2.0 (L) 3.6 - 11.0 K/uL    RBC 3.54 (L) 3.80 - 5.20 M/uL    HGB 10.0 (L) 11.5 - 16.0 g/dL    HCT 31.2 (L) 35.0 - 47.0 %    MCV 88.1 80.0 - 99.0 FL    MCH 28.2 26.0 - 34.0 PG    MCHC 32.1 30.0 - 36.5 g/dL    RDW 13.5 11.5 - 14.5 %    PLATELET 221 996 - 506 K/uL    MPV 10.2 8.9 - 12.9 FL    NRBC 0.0 0.0  WBC    ABSOLUTE NRBC 0.00 0.00 - 7.70 K/uL   METABOLIC PANEL, COMPREHENSIVE    Collection Time: 02/15/23  7:26 AM   Result Value Ref Range    Sodium 137 136 - 145 mmol/L    Potassium 3.6 3.5 - 5.1 mmol/L    Chloride 106 97 - 108 mmol/L    CO2 26 21 - 32 mmol/L    Anion gap 5 5 - 15 mmol/L    Glucose 286 (H) 65 - 100 mg/dL    BUN 9 6 - 20 mg/dL    Creatinine 0.68 0.55 - 1.02 mg/dL    BUN/Creatinine ratio 13 12 - 20      eGFR >60 >60 ml/min/1.73m2    Calcium 8.8 8.5 - 10.1 mg/dL    Bilirubin, total 0.2 0.2 - 1.0 mg/dL    AST (SGOT) 74 (H) 15 - 37 U/L    ALT (SGPT) 86 (H) 12 - 78 U/L    Alk. phosphatase 50 45 - 117 U/L    Protein, total 7.0 6.4 - 8.2 g/dL    Albumin 2.4 (L) 3.5 - 5.0 g/dL    Globulin 4.6 (H) 2.0 - 4.0 g/dL    A-G Ratio 0.5 (L) 1.1 - 2.2     GLUCOSE, POC    Collection Time: 02/15/23 11:22 AM   Result Value Ref Range    Glucose (POC) 365 (H) 65 - 100 mg/dL    Performed by Darien Pain        Review of Systems    Constitutional: Negative for chills and fever. HENT: Negative. Eyes: Negative.     Respiratory: Shortness of breath, wheezing. Cardiovascular: Negative. Gastrointestinal: Positive for abdominal pain and diarrhea. Skin: Negative. Neurological: Dizzy. Physical Exam:      Constitutional: pt is oriented to person, place, and time. HENT: on nasal cannula  Head: Normocephalic and atraumatic. Eyes: Pupils are equal, round, and reactive to light. EOM are normal.   Cardiovascular: Normal rate, regular rhythm and normal heart sounds. Pulmonary/Chest: Moderate wheezing diffusely. Abdominal: Soft. Bowel sounds are normal. There is no abdominal tenderness. There is no rebound and no guarding. Musculoskeletal: Normal range of motion. Neurological: pt is alert and oriented to person, place, and time. CTA CHEST W OR W WO CONT   Final Result   Respiratory motion limits evaluation   1. No visualized pulmonary embolus. 2. Perihilar and bibasilar opacity suggesting airspace disease. 3. Left basilar mass. 4. Incidental findings as above      XR CHEST PORT   Final Result   1. Bilateral pleural effusions with associated passive atelectasis and/or   consolidation. 2. Bibasilar opacities most suggestive of scarring/atelectasis.  Developing   airspace disease cannot be excluded                  Recent Results (from the past 24 hour(s))   GLUCOSE, POC    Collection Time: 02/14/23  4:46 PM   Result Value Ref Range    Glucose (POC) 220 (H) 65 - 100 mg/dL    Performed by Thai Sen, POC    Collection Time: 02/14/23  8:34 PM   Result Value Ref Range    Glucose (POC) 298 (H) 65 - 100 mg/dL    Performed by Elida Lamas    GLUCOSE, POC    Collection Time: 02/15/23  7:25 AM   Result Value Ref Range    Glucose (POC) 270 (H) 65 - 100 mg/dL    Performed by Karime Preutt    CBC W/O DIFF    Collection Time: 02/15/23  7:26 AM   Result Value Ref Range    WBC 2.0 (L) 3.6 - 11.0 K/uL    RBC 3.54 (L) 3.80 - 5.20 M/uL    HGB 10.0 (L) 11.5 - 16.0 g/dL    HCT 31.2 (L) 35.0 - 47.0 %    MCV 88.1 80.0 - 99.0 FL    MCH 28.2 26.0 - 34.0 PG    MCHC 32.1 30.0 - 36.5 g/dL    RDW 13.5 11.5 - 14.5 %    PLATELET 201 080 - 549 K/uL    MPV 10.2 8.9 - 12.9 FL    NRBC 0.0 0.0  WBC    ABSOLUTE NRBC 0.00 0.00 - 0.68 K/uL   METABOLIC PANEL, COMPREHENSIVE    Collection Time: 02/15/23  7:26 AM   Result Value Ref Range    Sodium 137 136 - 145 mmol/L    Potassium 3.6 3.5 - 5.1 mmol/L    Chloride 106 97 - 108 mmol/L    CO2 26 21 - 32 mmol/L    Anion gap 5 5 - 15 mmol/L    Glucose 286 (H) 65 - 100 mg/dL    BUN 9 6 - 20 mg/dL    Creatinine 0.68 0.55 - 1.02 mg/dL    BUN/Creatinine ratio 13 12 - 20      eGFR >60 >60 ml/min/1.73m2    Calcium 8.8 8.5 - 10.1 mg/dL    Bilirubin, total 0.2 0.2 - 1.0 mg/dL    AST (SGOT) 74 (H) 15 - 37 U/L    ALT (SGPT) 86 (H) 12 - 78 U/L    Alk.  phosphatase 50 45 - 117 U/L    Protein, total 7.0 6.4 - 8.2 g/dL    Albumin 2.4 (L) 3.5 - 5.0 g/dL    Globulin 4.6 (H) 2.0 - 4.0 g/dL    A-G Ratio 0.5 (L) 1.1 - 2.2     GLUCOSE, POC    Collection Time: 02/15/23 11:22 AM   Result Value Ref Range    Glucose (POC) 365 (H) 65 - 100 mg/dL    Performed by Skippy Stage        Results       Procedure Component Value Units Date/Time    CULTURE, BLOOD #1 [[id number]]     Order Status: Canceled Specimen: Blood     CULTURE, BLOOD #2 [[id number]]     Order Status: Canceled Specimen: Blood     CULTURE, BLOOD [[id number]] Collected: 02/13/23 1653    Order Status: Completed Specimen: Blood Updated: 02/14/23 1412     Special Requests: No Special Requests        Culture result: No growth after 5 hours       CULTURE, BLOOD [[id number]] Collected: 02/13/23 1653    Order Status: Completed Specimen: Blood Updated: 02/14/23 1412     Special Requests: No Special Requests        Culture result: No growth after 5 hours       COVID-19 RAPID TEST [[id number]] Collected: 02/13/23 1653    Order Status: Completed Specimen: Nasopharyngeal Updated: 02/13/23 1748     COVID-19 rapid test Not Detected        Comment: Rapid [employer] ID Now   The specimen is NEGATIVE for SARS-CoV2, the novel coronavirus associated with COVID-19. A negative result does not rule out COVID-19. This test has been authorized by the FDA under an Emergency Use Authorization (EUA) for use by authorized laboratories. Fact sheet for Healthcare Providers:  http://www.naif.juaquin/ Fact sheet for Patients: http://www.naif.juaquin/   Methodology: Isothermal Nucleic Acid Amplification       INFLUENZA A & B AG (RAPID TEST) [[id number]] Collected: 02/13/23 1653    Order Status: Canceled Specimen: Nasopharyngeal from Nasal washing     INFLUENZA A & B AG (RAPID TEST) [[id number]] Collected: 02/13/23 1653    Order Status: Completed Specimen: Nasal washing Updated: 02/13/23 1748     Influenza A Antigen Negative        Influenza B Antigen Negative                Labs:     Recent Labs     02/15/23  0726 02/13/23  1653   WBC 2.0* 2.6*   HGB 10.0* 12.5   HCT 31.2* 37.6    196       Recent Labs     02/15/23  0726 02/14/23  0905 02/13/23  1653    133* 126*   K 3.6 3.4* 3.4*    100 89*   CO2 26 29 28   BUN 9 9 14   CREA 0.68 0.93 0.97   * 211* 209*   CA 8.8 8.6 9.2       Recent Labs     02/15/23  0726 02/14/23  0905 02/13/23  1653   ALT 86* 51 38   AP 50 59 68   TBILI 0.2 0.4 0.6   TP 7.0 7.7 8.2   ALB 2.4* 2.8* 3.3*   GLOB 4.6* 4.9* 4.9*       No results for input(s): INR, PTP, APTT, INREXT, INREXT in the last 72 hours. No results for input(s): FE, TIBC, PSAT, FERR in the last 72 hours. No results found for: FOL, RBCF   Recent Labs     02/13/23 2238 02/13/23  1836   PH 7.43 7.47*   PCO2 46* 39   PO2 54* 70*       No results for input(s): CPK, CKNDX, TROIQ in the last 72 hours.     No lab exists for component: CPKMB  No results found for: CHOL, CHOLX, CHLST, CHOLV, HDL, HDLP, LDL, LDLC, DLDLP, TGLX, TRIGL, TRIGP, CHHD, CHHDX  Lab Results   Component Value Date/Time    Glucose (POC) 365 (H) 02/15/2023 11:22 AM    Glucose (POC) 270 (H) 02/15/2023 07:25 AM    Glucose (POC) 298 (H) 02/14/2023 08:34 PM    Glucose (POC) 220 (H) 02/14/2023 04:46 PM    Glucose (POC) 109 (H) 02/14/2023 11:22 AM     Lab Results   Component Value Date/Time    Color Yellow/Straw 02/13/2023 11:19 PM    Appearance Clear 02/13/2023 11:19 PM    Specific gravity 1.022 02/13/2023 11:19 PM    pH (UA) 5.0 02/13/2023 11:19 PM    Protein Negative 02/13/2023 11:19 PM    Glucose >1,000 (A) 02/13/2023 11:19 PM    Ketone Negative 02/13/2023 11:19 PM    Bilirubin Negative 02/13/2023 11:19 PM    Urobilinogen 0.1 (L) 02/13/2023 11:19 PM    Nitrites Negative 02/13/2023 11:19 PM    Leukocyte Esterase Negative 02/13/2023 11:19 PM         Assessment:     Gram negative PNA  Levaquin 750 mg every 24 hours  3.375 g IV Zosyn every 8 hours     Acute hypoxic respiratory failure on 4L nasal cannula O2 sat 98%  Duo neb 3 mL every 6 hours  Symbicort 2 puffs BID    History of lung cancer  Pt has small cell lung cancer   Was started on carboplatin, etoposide and atezolizumab on 2/6/23.    Pulmonology consult     Type 2 diabetes  Humalog injection 4 times daily  Glucose 286 today    Diarrhea  Start on 4 mg imodium   Replete fluids    Hypertension (resolved)    Hyponatremia(resolved)    Hypokalemia (resolved)    Plan:     Lovenox for DVT prophylaxis     PT OT consult and possible discharge in next 24 to 48 hours        Current Facility-Administered Medications:     sodium chloride (NS) flush 5-10 mL, 5-10 mL, IntraVENous, PRN, Suzi Benavidez MD    levoFLOXacin (LEVAQUIN) 750 mg in D5W IVPB, 750 mg, IntraVENous, Q24H, Suzi Benavidez MD, Last Rate: 100 mL/hr at 02/14/23 1721, 750 mg at 02/14/23 1721    insulin lispro (HUMALOG) injection, , SubCUTAneous, AC&HS, Suzi Benavidez MD, 7 Units at 02/15/23 0816    glucose chewable tablet 16 g, 4 Tablet, Oral, PRN, Suzi Benavidez MD    glucagon (GLUCAGEN) injection 1 mg, 1 mg, IntraMUSCular, PRN, Suzi Benavidez MD    0.9% sodium chloride infusion, 75 mL/hr, IntraVENous, CONTINUOUS, Suzi Benavidez MD, Last Rate: 75 mL/hr at 02/15/23 0818, 75 mL/hr at 02/15/23 0818    acetaminophen (TYLENOL) tablet 650 mg, 650 mg, Oral, Q6H PRN, 650 mg at 02/14/23 2353 **OR** acetaminophen (TYLENOL) suppository 650 mg, 650 mg, Rectal, Q6H PRN, Suzi Benavidez MD    polyethylene glycol (MIRALAX) packet 17 g, 17 g, Oral, DAILY PRN, Suzi Benavidez MD    ondansetron (ZOFRAN ODT) tablet 4 mg, 4 mg, Oral, Q8H PRN **OR** ondansetron (ZOFRAN) injection 4 mg, 4 mg, IntraVENous, Q6H PRN, Suzi Benavidez MD    enoxaparin (LOVENOX) injection 40 mg, 40 mg, SubCUTAneous, DAILY, Suzi Benavidez MD, 40 mg at 02/15/23 0816    albuterol-ipratropium (DUO-NEB) 2.5 MG-0.5 MG/3 ML, 3 mL, Nebulization, Q6H RT, Suzi Benavidez MD, 3 mL at 02/15/23 0746    budesonide-formoteroL (SYMBICORT) 160-4.5 mcg/actuation HFA inhaler 2 Puff, 2 Puff, Inhalation, BID, Suzi Benavidez MD, 2 Puff at 02/15/23 0900    piperacillin-tazobactam (ZOSYN) 3.375 g in 0.9% sodium chloride (MBP/ADV) 100 mL MBP, 3.375 g, IntraVENous, Q8H, Suzi Benavidez MD, Last Rate: 25 mL/hr at 02/15/23 0525, 3.375 g at 02/15/23 0403

## 2023-02-16 LAB
ALBUMIN SERPL-MCNC: 2.7 G/DL (ref 3.5–5)
ALBUMIN/GLOB SERPL: 0.6 (ref 1.1–2.2)
ALP SERPL-CCNC: 50 U/L (ref 45–117)
ALT SERPL-CCNC: 107 U/L (ref 12–78)
ANION GAP SERPL CALC-SCNC: 4 MMOL/L (ref 5–15)
AST SERPL W P-5'-P-CCNC: 76 U/L (ref 15–37)
BASOPHILS # BLD: 0 K/UL (ref 0–0.1)
BASOPHILS NFR BLD: 0 % (ref 0–1)
BILIRUB SERPL-MCNC: 0.2 MG/DL (ref 0.2–1)
BUN SERPL-MCNC: 11 MG/DL (ref 6–20)
BUN/CREAT SERPL: 16 (ref 12–20)
CA-I BLD-MCNC: 9 MG/DL (ref 8.5–10.1)
CHLORIDE SERPL-SCNC: 106 MMOL/L (ref 97–108)
CO2 SERPL-SCNC: 30 MMOL/L (ref 21–32)
CREAT SERPL-MCNC: 0.69 MG/DL (ref 0.55–1.02)
DIFFERENTIAL METHOD BLD: ABNORMAL
EOSINOPHIL # BLD: 0 K/UL (ref 0–0.4)
EOSINOPHIL NFR BLD: 0 % (ref 0–7)
ERYTHROCYTE [DISTWIDTH] IN BLOOD BY AUTOMATED COUNT: 13.7 % (ref 11.5–14.5)
GLOBULIN SER CALC-MCNC: 4.4 G/DL (ref 2–4)
GLUCOSE BLD STRIP.AUTO-MCNC: 134 MG/DL (ref 65–100)
GLUCOSE BLD STRIP.AUTO-MCNC: 185 MG/DL (ref 65–100)
GLUCOSE BLD STRIP.AUTO-MCNC: 244 MG/DL (ref 65–100)
GLUCOSE BLD STRIP.AUTO-MCNC: 261 MG/DL (ref 65–100)
GLUCOSE SERPL-MCNC: 163 MG/DL (ref 65–100)
HCT VFR BLD AUTO: 31.5 % (ref 35–47)
HGB BLD-MCNC: 10.3 G/DL (ref 11.5–16)
IMM GRANULOCYTES # BLD AUTO: 0 K/UL
IMM GRANULOCYTES NFR BLD AUTO: 0 %
LYMPHOCYTES # BLD: 2 K/UL (ref 0.8–3.5)
LYMPHOCYTES NFR BLD: 54 % (ref 12–49)
MCH RBC QN AUTO: 29 PG (ref 26–34)
MCHC RBC AUTO-ENTMCNC: 32.7 G/DL (ref 30–36.5)
MCV RBC AUTO: 88.7 FL (ref 80–99)
MONOCYTES # BLD: 0 K/UL (ref 0–1)
MONOCYTES NFR BLD: 1 % (ref 5–13)
NEUTS BAND NFR BLD MANUAL: 2 % (ref 0–6)
NEUTS SEG # BLD: 1.7 K/UL (ref 1.8–8)
NEUTS SEG NFR BLD: 43 % (ref 32–75)
NRBC # BLD: 0 K/UL (ref 0–0.01)
NRBC BLD-RTO: 0 PER 100 WBC
PATH REV BLD -IMP: ABNORMAL
PERFORMED BY, TECHID: ABNORMAL
PLATELET # BLD AUTO: 185 K/UL (ref 150–400)
PLATELET COMMENTS,PCOM: ABNORMAL
PMV BLD AUTO: 10.1 FL (ref 8.9–12.9)
POTASSIUM SERPL-SCNC: 2.9 MMOL/L (ref 3.5–5.1)
PROT SERPL-MCNC: 7.1 G/DL (ref 6.4–8.2)
RBC # BLD AUTO: 3.55 M/UL (ref 3.8–5.2)
RBC MORPH BLD: ABNORMAL
SODIUM SERPL-SCNC: 140 MMOL/L (ref 136–145)
WBC # BLD AUTO: 3.7 K/UL (ref 3.6–11)
WBC MORPH BLD: ABNORMAL

## 2023-02-16 PROCEDURE — 94761 N-INVAS EAR/PLS OXIMETRY MLT: CPT

## 2023-02-16 PROCEDURE — 77010033678 HC OXYGEN DAILY

## 2023-02-16 PROCEDURE — 65270000029 HC RM PRIVATE

## 2023-02-16 PROCEDURE — 74011250636 HC RX REV CODE- 250/636: Performed by: FAMILY MEDICINE

## 2023-02-16 PROCEDURE — 36415 COLL VENOUS BLD VENIPUNCTURE: CPT

## 2023-02-16 PROCEDURE — 80053 COMPREHEN METABOLIC PANEL: CPT

## 2023-02-16 PROCEDURE — 74011636637 HC RX REV CODE- 636/637: Performed by: FAMILY MEDICINE

## 2023-02-16 PROCEDURE — 94640 AIRWAY INHALATION TREATMENT: CPT

## 2023-02-16 PROCEDURE — 74011000258 HC RX REV CODE- 258: Performed by: FAMILY MEDICINE

## 2023-02-16 PROCEDURE — 74011250637 HC RX REV CODE- 250/637: Performed by: FAMILY MEDICINE

## 2023-02-16 PROCEDURE — 82962 GLUCOSE BLOOD TEST: CPT

## 2023-02-16 PROCEDURE — 85025 COMPLETE CBC W/AUTO DIFF WBC: CPT

## 2023-02-16 PROCEDURE — 74011000250 HC RX REV CODE- 250: Performed by: FAMILY MEDICINE

## 2023-02-16 RX ORDER — HYDROCHLOROTHIAZIDE 25 MG/1
25 TABLET ORAL DAILY
COMMUNITY
Start: 2023-01-18

## 2023-02-16 RX ORDER — AMLODIPINE BESYLATE 10 MG/1
10 TABLET ORAL DAILY
COMMUNITY
Start: 2023-01-18

## 2023-02-16 RX ADMIN — LEVOFLOXACIN 750 MG: 5 INJECTION, SOLUTION INTRAVENOUS at 18:00

## 2023-02-16 RX ADMIN — IPRATROPIUM BROMIDE AND ALBUTEROL SULFATE 3 ML: 2.5; .5 SOLUTION RESPIRATORY (INHALATION) at 13:49

## 2023-02-16 RX ADMIN — INSULIN LISPRO 7 UNITS: 100 INJECTION, SOLUTION INTRAVENOUS; SUBCUTANEOUS at 08:08

## 2023-02-16 RX ADMIN — INSULIN LISPRO 4 UNITS: 100 INJECTION, SOLUTION INTRAVENOUS; SUBCUTANEOUS at 11:30

## 2023-02-16 RX ADMIN — IPRATROPIUM BROMIDE AND ALBUTEROL SULFATE 3 ML: 2.5; .5 SOLUTION RESPIRATORY (INHALATION) at 19:26

## 2023-02-16 RX ADMIN — IPRATROPIUM BROMIDE AND ALBUTEROL SULFATE 3 ML: 2.5; .5 SOLUTION RESPIRATORY (INHALATION) at 08:40

## 2023-02-16 RX ADMIN — PIPERACILLIN AND TAZOBACTAM 3.38 G: 3; .375 INJECTION, POWDER, FOR SOLUTION INTRAVENOUS at 12:55

## 2023-02-16 RX ADMIN — ENOXAPARIN SODIUM 40 MG: 100 INJECTION SUBCUTANEOUS at 09:52

## 2023-02-16 RX ADMIN — BUDESONIDE AND FORMOTEROL FUMARATE DIHYDRATE 2 PUFF: 160; 4.5 AEROSOL RESPIRATORY (INHALATION) at 08:40

## 2023-02-16 RX ADMIN — PIPERACILLIN AND TAZOBACTAM 3.38 G: 3; .375 INJECTION, POWDER, FOR SOLUTION INTRAVENOUS at 21:02

## 2023-02-16 RX ADMIN — PIPERACILLIN AND TAZOBACTAM 3.38 G: 3; .375 INJECTION, POWDER, FOR SOLUTION INTRAVENOUS at 03:42

## 2023-02-16 NOTE — PROGRESS NOTES
General Daily Progress Note          Patient Name:   Kaden Harkins       YOB: 1960       Age:  60 y.o. Admit Date: 2/13/2023      Subjective:     CHIEF COMPLAINT:      Shortness of breath     HISTORY OF PRESENT ILLNESS:        Patient is a 60y.o. year old female with a past medical history of type 2 diabetes, hypertension, history of lung cancer status post chemoradiation on home oxygen PRN who presented to emergency room complaining of shortness of breath with associated decreased appetite for last 4 days. In the ED, pt had a temperature of 102. 1. Her oxygen saturation was 75% initially. DuoNeb was given by EMS and her saturation improved to 94%. CT scan of the chest done shows perihilar basilar opacity suggestive of airspace disease. Pt was negative for flu and COVID. She denies any fever, chills or chest pain. CTA Chest shows  1. No visualized pulmonary embolus. 2. Perihilar and bibasilar opacity suggesting airspace disease. 3. Left basilar mass. 2/14  She notes that the lightheadedness and shortness of breath that she had upon presentation has resolved. Pt mentions that she had a recently diagnosis of DM. Pt's urine had >1000 mg/dL of glucose and her potassium is 3.4. Pt was found to have gram negative PNA. 2/15  Pt reports that she feels dizzy today. She mentions that she has been having 3-4 episodes of diarrhea daily. She adds that she is having some abdominal pain but it is relieved with Tylenol. Pt notes that she has still been wheezing, but the nebulizer treatment is helping. She is on 3L nasal cannula and her O2 sat is 94%. She was seen by hematology yesterday. Pt has small cell lung cancer and was started on carboplatin, etoposide and atezolizumab on 2/6/23. Glucose 286 today. 2/16  Pt is still having bowel movements throughout the night but reports that they are more solid now. Pt is still wheezing diffusely, with some improvement in the right apex.  Pt is still on 2L nasal cannula. She mentions that she feels mucus in her chest but cannot cough it up. She is asking for Mucinex. Pt's blood pressure has been elevated throughout admission as she has not been given any of her home medications. She states that she is on HCTZ 25 mg and amlodipine. Objective:     Visit Vitals  BP (!) 142/76 (BP 1 Location: Left upper arm, BP Patient Position: Sitting)   Pulse 82   Temp 97.8 °F (36.6 °C)   Resp 20   Ht 5' 6\" (1.676 m)   Wt 95.2 kg (209 lb 14.1 oz)   SpO2 95%   BMI 33.88 kg/m²        Recent Results (from the past 24 hour(s))   GLUCOSE, POC    Collection Time: 02/15/23  5:03 PM   Result Value Ref Range    Glucose (POC) 346 (H) 65 - 100 mg/dL    Performed by [address], POC    Collection Time: 02/15/23  8:43 PM   Result Value Ref Range    Glucose (POC) 173 (H) 65 - 100 mg/dL    Performed by Dimple January    GLUCOSE, POC    Collection Time: 02/16/23  7:44 AM   Result Value Ref Range    Glucose (POC) 261 (H) 65 - 100 mg/dL    Performed by MERARI DISLA    CBC WITH AUTOMATED DIFF    Collection Time: 02/16/23 10:33 AM   Result Value Ref Range    WBC 3.7 3.6 - 11.0 K/uL    RBC 3.55 (L) 3.80 - 5.20 M/uL    HGB 10.3 (L) 11.5 - 16.0 g/dL    HCT 31.5 (L) 35.0 - 47.0 %    MCV 88.7 80.0 - 99.0 FL    MCH 29.0 26.0 - 34.0 PG    MCHC 32.7 30.0 - 36.5 g/dL    RDW 13.7 11.5 - 14.5 %    PLATELET 013 498 - 660 K/uL    MPV 10.1 8.9 - 12.9 FL    NRBC 0.0 0.0  WBC    ABSOLUTE NRBC 0.00 0.00 - 0.01 K/uL    NEUTROPHILS PENDING %    LYMPHOCYTES PENDING %    MONOCYTES PENDING %    EOSINOPHILS PENDING %    BASOPHILS PENDING %    IMMATURE GRANULOCYTES PENDING %    ABS. NEUTROPHILS PENDING K/UL    ABS. LYMPHOCYTES PENDING K/UL    ABS. MONOCYTES PENDING K/UL    ABS. EOSINOPHILS PENDING K/UL    ABS. BASOPHILS PENDING K/UL    ABS. IMM. GRANS.  PENDING K/UL    DF PENDING    GLUCOSE, POC    Collection Time: 02/16/23 11:17 AM   Result Value Ref Range    Glucose (POC) 244 (H) 65 - 100 mg/dL Performed by Nadya Teixeira        Review of Systems    Constitutional: Negative for chills and fever. HENT: Negative. Eyes: Negative. Respiratory: Shortness of breath, wheezing, productive cough. Cardiovascular: Negative. Gastrointestinal: Positive for diarrhea. Skin: Negative. Neurological: Dizzy. Physical Exam:      Constitutional: pt is oriented to person, place, and time. HENT: on nasal cannula  Head: Normocephalic and atraumatic. Eyes: Pupils are equal, round, and reactive to light. EOM are normal.   Cardiovascular: Normal rate, regular rhythm and normal heart sounds. Pulmonary/Chest: Moderate wheezing diffusely. Abdominal: Soft. Bowel sounds are normal. There is no abdominal tenderness. There is no rebound and no guarding. Musculoskeletal: Normal range of motion. Neurological: pt is alert and oriented to person, place, and time. CTA CHEST W OR W WO CONT   Final Result   Respiratory motion limits evaluation   1. No visualized pulmonary embolus. 2. Perihilar and bibasilar opacity suggesting airspace disease. 3. Left basilar mass. 4. Incidental findings as above      XR CHEST PORT   Final Result   1. Bilateral pleural effusions with associated passive atelectasis and/or   consolidation. 2. Bibasilar opacities most suggestive of scarring/atelectasis.  Developing   airspace disease cannot be excluded                  Recent Results (from the past 24 hour(s))   GLUCOSE, POC    Collection Time: 02/15/23  5:03 PM   Result Value Ref Range    Glucose (POC) 346 (H) 65 - 100 mg/dL    Performed by [address], POC    Collection Time: 02/15/23  8:43 PM   Result Value Ref Range    Glucose (POC) 173 (H) 65 - 100 mg/dL    Performed by Ramez Washington    GLUCOSE, POC    Collection Time: 02/16/23  7:44 AM   Result Value Ref Range    Glucose (POC) 261 (H) 65 - 100 mg/dL    Performed by MERARI DISLA    CBC WITH AUTOMATED DIFF    Collection Time: 02/16/23 10:33 AM Result Value Ref Range    WBC 3.7 3.6 - 11.0 K/uL    RBC 3.55 (L) 3.80 - 5.20 M/uL    HGB 10.3 (L) 11.5 - 16.0 g/dL    HCT 31.5 (L) 35.0 - 47.0 %    MCV 88.7 80.0 - 99.0 FL    MCH 29.0 26.0 - 34.0 PG    MCHC 32.7 30.0 - 36.5 g/dL    RDW 13.7 11.5 - 14.5 %    PLATELET 285 943 - 240 K/uL    MPV 10.1 8.9 - 12.9 FL    NRBC 0.0 0.0  WBC    ABSOLUTE NRBC 0.00 0.00 - 0.01 K/uL    NEUTROPHILS PENDING %    LYMPHOCYTES PENDING %    MONOCYTES PENDING %    EOSINOPHILS PENDING %    BASOPHILS PENDING %    IMMATURE GRANULOCYTES PENDING %    ABS. NEUTROPHILS PENDING K/UL    ABS. LYMPHOCYTES PENDING K/UL    ABS. MONOCYTES PENDING K/UL    ABS. EOSINOPHILS PENDING K/UL    ABS. BASOPHILS PENDING K/UL    ABS. IMM. GRANS. PENDING K/UL    DF PENDING    GLUCOSE, POC    Collection Time: 02/16/23 11:17 AM   Result Value Ref Range    Glucose (POC) 244 (H) 65 - 100 mg/dL    Performed by Jonathan Abad        Results       Procedure Component Value Units Date/Time    CULTURE, BLOOD #1 [[id number]]     Order Status: Canceled Specimen: Blood     CULTURE, BLOOD #2 [[id number]]     Order Status: Canceled Specimen: Blood     CULTURE, BLOOD [[id number]] Collected: 02/13/23 1653    Order Status: Completed Specimen: Blood Updated: 02/16/23 1007     Special Requests: No Special Requests        Culture result: No growth 2 days       CULTURE, BLOOD [[id number]] Collected: 02/13/23 1653    Order Status: Completed Specimen: Blood Updated: 02/16/23 1007     Special Requests: No Special Requests        Culture result: No growth 2 days       COVID-19 RAPID TEST [[id number]] Collected: 02/13/23 1653    Order Status: Completed Specimen: Nasopharyngeal Updated: 02/13/23 9829     COVID-19 rapid test Not Detected        Comment: Rapid [employer] ID Now   The specimen is NEGATIVE for SARS-CoV2, the novel coronavirus associated with COVID-19. A negative result does not rule out COVID-19.    This test has been authorized by the FDA under an Emergency Use Authorization (EUA) for use by authorized laboratories. Fact sheet for Healthcare Providers:  http://www.naif.juaquin/ Fact sheet for Patients: http://www.naif.juaquin/   Methodology: Isothermal Nucleic Acid Amplification       INFLUENZA A & B AG (RAPID TEST) [[id number]] Collected: 02/13/23 1653    Order Status: Canceled Specimen: Nasopharyngeal from Nasal washing     INFLUENZA A & B AG (RAPID TEST) [[id number]] Collected: 02/13/23 1653    Order Status: Completed Specimen: Nasal washing Updated: 02/13/23 1748     Influenza A Antigen Negative        Influenza B Antigen Negative                Labs:     Recent Labs     02/16/23  1033 02/15/23  0726   WBC 3.7 2.0*   HGB 10.3* 10.0*   HCT 31.5* 31.2*    158       Recent Labs     02/15/23  0726 02/14/23  0905 02/13/23  1653    133* 126*   K 3.6 3.4* 3.4*    100 89*   CO2 26 29 28   BUN 9 9 14   CREA 0.68 0.93 0.97   * 211* 209*   CA 8.8 8.6 9.2       Recent Labs     02/15/23  0726 02/14/23  0905 02/13/23  1653   ALT 86* 51 38   AP 50 59 68   TBILI 0.2 0.4 0.6   TP 7.0 7.7 8.2   ALB 2.4* 2.8* 3.3*   GLOB 4.6* 4.9* 4.9*       No results for input(s): INR, PTP, APTT, INREXT, INREXT in the last 72 hours. No results for input(s): FE, TIBC, PSAT, FERR in the last 72 hours. No results found for: FOL, RBCF   Recent Labs     02/13/23 2238 02/13/23  1836   PH 7.43 7.47*   PCO2 46* 39   PO2 54* 70*       No results for input(s): CPK, CKNDX, TROIQ in the last 72 hours.     No lab exists for component: CPKMB  No results found for: CHOL, CHOLX, CHLST, CHOLV, HDL, HDLP, LDL, LDLC, DLDLP, TGLX, TRIGL, TRIGP, CHHD, CHHDX  Lab Results   Component Value Date/Time    Glucose (POC) 244 (H) 02/16/2023 11:17 AM    Glucose (POC) 261 (H) 02/16/2023 07:44 AM    Glucose (POC) 173 (H) 02/15/2023 08:43 PM    Glucose (POC) 346 (H) 02/15/2023 05:03 PM    Glucose (POC) 365 (H) 02/15/2023 11:22 AM     Lab Results   Component Value Date/Time    Color Yellow/Straw 02/13/2023 11:19 PM    Appearance Clear 02/13/2023 11:19 PM    Specific gravity 1.022 02/13/2023 11:19 PM    pH (UA) 5.0 02/13/2023 11:19 PM    Protein Negative 02/13/2023 11:19 PM    Glucose >1,000 (A) 02/13/2023 11:19 PM    Ketone Negative 02/13/2023 11:19 PM    Bilirubin Negative 02/13/2023 11:19 PM    Urobilinogen 0.1 (L) 02/13/2023 11:19 PM    Nitrites Negative 02/13/2023 11:19 PM    Leukocyte Esterase Negative 02/13/2023 11:19 PM         Assessment:     Gram negative PNA  Levaquin 750 mg every 24 hours  3.375 g IV Zosyn every 8 hours   Start on Mucinex 200 mg every 4 hours    Acute hypoxic respiratory failure on 2L nasal cannula O2 sat 94%  Duo neb 3 mL every 6 hours  Symbicort 2 puffs BID    History of lung cancer  Pt has small cell lung cancer   Was started on carboplatin, etoposide and atezolizumab on 2/6/23.    Followed by pulmonology and hematology    Type 2 diabetes  Humalog injection 4 times daily  Glucose 261 today    Hypertension   Start on home dosage of HCTZ  Start on amlodipine    Diarrhea (resolved)    Hyponatremia(resolved)    Hypokalemia (resolved)    Plan:     Lovenox for DVT prophylaxis     Repeat labs    PT OT consult and possible discharge in next 24 to 48 hours        Current Facility-Administered Medications:     sodium chloride (NS) flush 5-10 mL, 5-10 mL, IntraVENous, PRN, Suzi Benavidez MD    levoFLOXacin (LEVAQUIN) 750 mg in D5W IVPB, 750 mg, IntraVENous, Q24H, Suzi Benavidez MD, Last Rate: 100 mL/hr at 02/15/23 1715, 750 mg at 02/15/23 1715    insulin lispro (HUMALOG) injection, , SubCUTAneous, AC&HS, Suzi Benavidez MD, 7 Units at 02/16/23 0808    glucose chewable tablet 16 g, 4 Tablet, Oral, PRN, Suzi Benavidez MD    glucagon (GLUCAGEN) injection 1 mg, 1 mg, IntraMUSCular, PRN, Suzi Benavidez MD    0.9% sodium chloride infusion, 75 mL/hr, IntraVENous, CONTINUOUS, Suzi Benavidez MD, Last Rate: 75 mL/hr at 02/15/23 0818, 75 mL/hr at 02/15/23 [facility] acetaminophen (TYLENOL) tablet 650 mg, 650 mg, Oral, Q6H PRN, 650 mg at 02/14/23 0543 **OR** acetaminophen (TYLENOL) suppository 650 mg, 650 mg, Rectal, Q6H PRN, Suzi Benavidez MD    polyethylene glycol (MIRALAX) packet 17 g, 17 g, Oral, DAILY PRN, Suzi Benavidez MD    ondansetron (ZOFRAN ODT) tablet 4 mg, 4 mg, Oral, Q8H PRN **OR** ondansetron (ZOFRAN) injection 4 mg, 4 mg, IntraVENous, Q6H PRN, Suzi Benavidez MD    enoxaparin (LOVENOX) injection 40 mg, 40 mg, SubCUTAneous, DAILY, Suzi Benavidez MD, 40 mg at 02/16/23 6922    albuterol-ipratropium (DUO-NEB) 2.5 MG-0.5 MG/3 ML, 3 mL, Nebulization, Q6H RT, Suzi Benavidez MD, 3 mL at 02/16/23 0840    budesonide-formoteroL (SYMBICORT) 160-4.5 mcg/actuation HFA inhaler 2 Puff, 2 Puff, Inhalation, BID, Suzi Benavidez MD, 2 Puff at 02/16/23 0840    piperacillin-tazobactam (ZOSYN) 3.375 g in 0.9% sodium chloride (MBP/ADV) 100 mL MBP, 3.375 g, IntraVENous, Q8H, Suzi Benavidez MD, Last Rate: 25 mL/hr at 02/16/23 0342, 3.375 g at 02/16/23 0342

## 2023-02-16 NOTE — PROGRESS NOTES
CM in to speak with patient and discuss recs for [facility]. Patient in agreement and states she does not have a preference. Choice letter signed and placed on chart. Referral sent.

## 2023-02-16 NOTE — PROGRESS NOTES
PT eval order received and acknowledged. Pt screened and is currently presenting with at baseline I for functional mobility/transfers. Pt amb in room without AD, denies concerns or change in mobility since admission. PT evaluation order will be discontinued at this time as pt has no acute PT needs. Please reorder PT if pt functional status changes. Thank you. Functional Measure:  [employer] AM-PAC 6 Clicks         Basic Mobility Inpatient Short Form  How much difficulty does the patient currently have. .. Unable A Lot A Little None   1. Turning over in bed (including adjusting bedclothes, sheets and blankets)? [] 1   [] 2   [] 3   [x] 4   2. Sitting down on and standing up from a chair with arms ( e.g., wheelchair, bedside commode, etc.)   [] 1   [] 2   [] 3   [x] 4   3. Moving from lying on back to sitting on the side of the bed? [] 1   [] 2   [] 3   [x] 4          How much help from another person does the patient currently need. .. Total A Lot A Little None   4. Moving to and from a bed to a chair (including a wheelchair)? [] 1   [] 2   [] 3   [x] 4   5. Need to walk in hospital room? [] 1   [] 2   [] 3   [x] 4   6. Climbing 3-5 steps with a railing? [] 1   [] 2   [] 3   [x] 4   © 2007, Trustees of [employer], under license to [facility]. All rights reserved     Score:  Initial: 24/24 Most Recent: X (Date: 2/16/2023 )   Interpretation of Tool:  Represents activities that are increasingly more difficult (i.e. Bed mobility, Transfers, Gait).   Score 24 23 22-20 19-15 14-10 9-7 6   Modifier CH CI CJ CK CL CM CN

## 2023-02-16 NOTE — PROGRESS NOTES
OT eval order received and acknowledged. Pt screened and demonstrating baseline independence for self care tasks and functional mobility/transfers. Pt ambulating within room w/ no concerns and reports wearing O2 at baseline (liters vary based on need). She completed LB dressing at EOB. Pt reports no need for skilled OT services at this time. OT evaluation order will therefore be discontinued this pt has no acute OT needs. Please reorder OT if pt's functional status changes. Thank you. [address] [place] [id number] AM-PACTM \"6 Clicks\"                                                       Daily Activity Inpatient Short Form  How much help from another person does the patient currently need. .. Total; A Lot A Little None   1. Putting on and taking off regular lower body clothing? []  1 []  2 []  3 [x]  4   2. Bathing (including washing, rinsing, drying)? []  1 []  2 []  3 [x]  4   3. Toileting, which includes using toilet, bedpan or urinal? [] 1 []  2 []  3 [x]  4   4. Putting on and taking off regular upper body clothing? []  1 []  2 []  3 [x]  4   5. Taking care of personal grooming such as brushing teeth? []  1 []  2 []  3 [x]  4   6. Eating meals? []  1 []  2 []  3 [x]  4   © 2007, Trustees of [address] [place] [address] [address] [id number], under license to [employer].  All rights reserved     Score: 24/24

## 2023-02-16 NOTE — PROGRESS NOTES
General Daily Progress Note          Patient Name:   Marquita Mitchell       YOB: 1960       Age:  60 y.o. Admit Date: 2/13/2023      Subjective:     CHIEF COMPLAINT:      Shortness of breath     HISTORY OF PRESENT ILLNESS:        Patient is a 60y.o. year old female with a past medical history of type 2 diabetes, hypertension, history of lung cancer status post chemoradiation on home oxygen PRN who presented to emergency room complaining of shortness of breath with associated decreased appetite for last 4 days. In the ED, pt had a temperature of 102. 1. Her oxygen saturation was 75% initially. DuoNeb was given by EMS and her saturation improved to 94%. CT scan of the chest done shows perihilar basilar opacity suggestive of airspace disease. Pt was negative for flu and COVID. She denies any fever, chills or chest pain. CTA Chest shows  1. No visualized pulmonary embolus. 2. Perihilar and bibasilar opacity suggesting airspace disease. 3. Left basilar mass. 2/14  She notes that the lightheadedness and shortness of breath that she had upon presentation has resolved. Pt mentions that she had a recently diagnosis of DM. Pt's urine had >1000 mg/dL of glucose and her potassium is 3.4. Pt was found to have gram negative PNA. 2/15  Pt reports that she feels dizzy today. She mentions that she has been having 3-4 episodes of diarrhea daily. She adds that she is having some abdominal pain but it is relieved with Tylenol. Pt notes that she has still been wheezing, but the nebulizer treatment is helping. She is on 3L nasal cannula and her O2 sat is 94%. She was seen by hematology yesterday. Pt has small cell lung cancer and was started on carboplatin, etoposide and atezolizumab on 2/6/23. Glucose 286 today. 2/16  Pt is still having bowel movements throughout the night but reports that they are more solid now. Pt is still wheezing diffusely, with some improvement in the right apex.  Pt is still on 2L nasal cannula. She mentions that she feels mucus in her chest but cannot cough it up. She is asking for Mucinex. Pt's blood pressure has been elevated throughout admission as she has not been given any of her home medications. She states that she is on HCTZ 25 mg and amlodipine. Objective:     Visit Vitals  BP (!) 143/79 (BP 1 Location: Left upper arm, BP Patient Position: Sitting)   Pulse 64   Temp 98.5 °F (36.9 °C)   Resp 20   Ht 5' 6\" (1.676 m)   Wt 209 lb 14.1 oz (95.2 kg)   SpO2 96%   BMI 33.88 kg/m²        Recent Results (from the past 24 hour(s))   GLUCOSE, POC    Collection Time: 02/15/23 11:22 AM   Result Value Ref Range    Glucose (POC) 365 (H) 65 - 100 mg/dL    Performed by [address] [place] [address], POC    Collection Time: 02/15/23  5:03 PM   Result Value Ref Range    Glucose (POC) 346 (H) 65 - 100 mg/dL    Performed by [employer] [facility]    GLUCOSE, POC    Collection Time: 02/15/23  8:43 PM   Result Value Ref Range    Glucose (POC) 173 (H) 65 - 100 mg/dL    Performed by Karen Serrano        Review of Systems    Constitutional: Negative for chills and fever. HENT: Negative. Eyes: Negative. Respiratory: Shortness of breath, wheezing, productive cough. Cardiovascular: Negative. Gastrointestinal: Positive for diarrhea. Skin: Negative. Neurological: Dizzy. Physical Exam:      Constitutional: pt is oriented to person, place, and time. HENT: on nasal cannula  Head: Normocephalic and atraumatic. Eyes: Pupils are equal, round, and reactive to light. EOM are normal.   Cardiovascular: Normal rate, regular rhythm and normal heart sounds. Pulmonary/Chest: Moderate wheezing diffusely. Abdominal: Soft. Bowel sounds are normal. There is no abdominal tenderness. There is no rebound and no guarding. Musculoskeletal: Normal range of motion. Neurological: pt is alert and oriented to person, place, and time.      CTA CHEST W OR W WO CONT   Final Result   Respiratory motion limits evaluation   1. No visualized pulmonary embolus. 2. Perihilar and bibasilar opacity suggesting airspace disease. 3. Left basilar mass. 4. Incidental findings as above      XR CHEST PORT   Final Result   1. Bilateral pleural effusions with associated passive atelectasis and/or   consolidation. 2. Bibasilar opacities most suggestive of scarring/atelectasis. Developing   airspace disease cannot be excluded                  Recent Results (from the past 24 hour(s))   GLUCOSE, POC    Collection Time: 02/15/23 11:22 AM   Result Value Ref Range    Glucose (POC) 365 (H) 65 - 100 mg/dL    Performed by [address] [place] [address], POC    Collection Time: 02/15/23  5:03 PM   Result Value Ref Range    Glucose (POC) 346 (H) 65 - 100 mg/dL    Performed by Fanta Dejesus    GLUCOSE, POC    Collection Time: 02/15/23  8:43 PM   Result Value Ref Range    Glucose (POC) 173 (H) 65 - 100 mg/dL    Performed by Ross Ornelas        Results       Procedure Component Value Units Date/Time    CULTURE, BLOOD #1 [[id number]]     Order Status: Canceled Specimen: Blood     CULTURE, BLOOD #2 [[id number]]     Order Status: Canceled Specimen: Blood     CULTURE, BLOOD [[id number]] Collected: 02/13/23 1653    Order Status: Completed Specimen: Blood Updated: 02/15/23 1336     Special Requests: No Special Requests        Culture result: No growth 1 day       CULTURE, BLOOD [[id number]] Collected: 02/13/23 1653    Order Status: Completed Specimen: Blood Updated: 02/15/23 1336     Special Requests: No Special Requests        Culture result: No growth 1 day       COVID-19 RAPID TEST [[id number]] Collected: 02/13/23 1653    Order Status: Completed Specimen: Nasopharyngeal Updated: 02/13/23 0005     COVID-19 rapid test Not Detected        Comment: Rapid Abbott ID Now   The specimen is NEGATIVE for SARS-CoV2, the novel coronavirus associated with COVID-19. A negative result does not rule out COVID-19.    This test has been authorized by the FDA under an Emergency Use Authorization (EUA) for use by authorized laboratories. Fact sheet for Healthcare Providers:  http://www.naif.juaquin/ Fact sheet for Patients: http://www.naif.juaquin/   Methodology: Isothermal Nucleic Acid Amplification       INFLUENZA A & B AG (RAPID TEST) [[id number]] Collected: 02/13/23 1653    Order Status: Canceled Specimen: Nasopharyngeal from Nasal washing     INFLUENZA A & B AG (RAPID TEST) [[id number]] Collected: 02/13/23 1653    Order Status: Completed Specimen: Nasal washing Updated: 02/13/23 1748     Influenza A Antigen Negative        Influenza B Antigen Negative                Labs:     Recent Labs     02/15/23  0726 02/13/23  1653   WBC 2.0* 2.6*   HGB 10.0* 12.5   HCT 31.2* 37.6    196       Recent Labs     02/15/23  0726 02/14/23  0905 02/13/23  1653    133* 126*   K 3.6 3.4* 3.4*    100 89*   CO2 26 29 28   BUN 9 9 14   CREA 0.68 0.93 0.97   * 211* 209*   CA 8.8 8.6 9.2       Recent Labs     02/15/23  0726 02/14/23  0905 02/13/23  1653   ALT 86* 51 38   AP 50 59 68   TBILI 0.2 0.4 0.6   TP 7.0 7.7 8.2   ALB 2.4* 2.8* 3.3*   GLOB 4.6* 4.9* 4.9*       No results for input(s): INR, PTP, APTT, INREXT, INREXT in the last 72 hours. No results for input(s): FE, TIBC, PSAT, FERR in the last 72 hours. No results found for: FOL, RBCF   Recent Labs     02/13/23 2238 02/13/23  1836   PH 7.43 7.47*   PCO2 46* 39   PO2 54* 70*       No results for input(s): CPK, CKNDX, TROIQ in the last 72 hours.     No lab exists for component: CPKMB  No results found for: CHOL, CHOLX, CHLST, CHOLV, HDL, HDLP, LDL, LDLC, DLDLP, TGLX, TRIGL, TRIGP, CHHD, CHHDX  Lab Results   Component Value Date/Time    Glucose (POC) 173 (H) 02/15/2023 08:43 PM    Glucose (POC) 346 (H) 02/15/2023 05:03 PM    Glucose (POC) 365 (H) 02/15/2023 11:22 AM    Glucose (POC) 270 (H) 02/15/2023 07:25 AM    Glucose (POC) 298 (H) 02/14/2023 08:34 PM     Lab Results Component Value Date/Time    Color Yellow/Straw 02/13/2023 11:19 PM    Appearance Clear 02/13/2023 11:19 PM    Specific gravity 1.022 02/13/2023 11:19 PM    pH (UA) 5.0 02/13/2023 11:19 PM    Protein Negative 02/13/2023 11:19 PM    Glucose >1,000 (A) 02/13/2023 11:19 PM    Ketone Negative 02/13/2023 11:19 PM    Bilirubin Negative 02/13/2023 11:19 PM    Urobilinogen 0.1 (L) 02/13/2023 11:19 PM    Nitrites Negative 02/13/2023 11:19 PM    Leukocyte Esterase Negative 02/13/2023 11:19 PM         Assessment:     Gram negative PNA  Levaquin 750 mg every 24 hours  3.375 g IV Zosyn every 8 hours   Start on Mucinex 200 mg every 4 hours    Acute hypoxic respiratory failure on 2L nasal cannula O2 sat 94%  Duo neb 3 mL every 6 hours  Symbicort 2 puffs BID    History of lung cancer  Pt has small cell lung cancer   Was started on carboplatin, etoposide and atezolizumab on 2/6/23.    Followed by pulmonology and hematology    Type 2 diabetes  Humalog injection 4 times daily  Glucose 261 today    Hypertension   Start on home dosage of HCTZ  Start on amlodipine    Diarrhea (resolved)    Hyponatremia(resolved)    Hypokalemia (resolved)    Plan:     Lovenox for DVT prophylaxis     Repeat labs    PT OT consult and possible discharge in next 24 to 48 hours        Current Facility-Administered Medications:     sodium chloride (NS) flush 5-10 mL, 5-10 mL, IntraVENous, PRN, Suzi Benavidez MD    levoFLOXacin (LEVAQUIN) 750 mg in D5W IVPB, 750 mg, IntraVENous, Q24H, Suzi Benavidez MD, Last Rate: 100 mL/hr at 02/15/23 1715, 750 mg at 02/15/23 1715    insulin lispro (HUMALOG) injection, , SubCUTAneous, AC&HS, Suzi Benavidez MD, 10 Units at 02/15/23 1715    glucose chewable tablet 16 g, 4 Tablet, Oral, PRN, Suzi Benavidez MD    glucagon (GLUCAGEN) injection 1 mg, 1 mg, IntraMUSCular, PRN, Suzi Benavidez MD    0.9% sodium chloride infusion, 75 mL/hr, IntraVENous, CONTINUOUS, Suzi Benavidez MD, Last Rate: 75 mL/hr at 02/15/23 0818, 75 mL/hr at 02/15/23 0818    acetaminophen (TYLENOL) tablet 650 mg, 650 mg, Oral, Q6H PRN, 650 mg at 02/14/23 2353 **OR** acetaminophen (TYLENOL) suppository 650 mg, 650 mg, Rectal, Q6H PRN, Suzi Benavidez MD    polyethylene glycol (MIRALAX) packet 17 g, 17 g, Oral, DAILY PRN, Suzi Benavidez MD    ondansetron (ZOFRAN ODT) tablet 4 mg, 4 mg, Oral, Q8H PRN **OR** ondansetron (ZOFRAN) injection 4 mg, 4 mg, IntraVENous, Q6H PRN, Suzi Benavidez MD    enoxaparin (LOVENOX) injection 40 mg, 40 mg, SubCUTAneous, DAILY, Suzi Benavidez MD, 40 mg at 02/15/23 0816    albuterol-ipratropium (DUO-NEB) 2.5 MG-0.5 MG/3 ML, 3 mL, Nebulization, Q6H RT, Suzi Bneavidez MD, 3 mL at 02/15/23 1934    budesonide-formoteroL (SYMBICORT) 160-4.5 mcg/actuation HFA inhaler 2 Puff, 2 Puff, Inhalation, BID, Suzi Benavidez MD, 2 Puff at 02/15/23 1935    piperacillin-tazobactam (ZOSYN) 3.375 g in 0.9% sodium chloride (MBP/ADV) 100 mL MBP, 3.375 g, IntraVENous, Q8H, Suzi Benavidez MD, Last Rate: 25 mL/hr at 02/16/23 0342, 3.375 g at 02/16/23 0342

## 2023-02-16 NOTE — PROGRESS NOTES
Hematology/Oncology   Progress Note    Patient: Rula Cope MRN: [id number]     YOB: 1960  Age: 60 y.o. Sex: female      Admit Date: 2/13/2023    LOS: 2 days     Chief Complaint: Admitted with worsening shortness of breath    Subjective:     Feels much better today. Constitutional No fevers, chills, night sweats, excessive fatigue or weight loss. Allergic/Immunologic No recent allergic reactions   Eyes No significant visual difficulties. No diplopia. ENMT No problems with hearing, no sore throat, no sinus drainage. Endocrine No hot flashes or night sweats. No cold intolerance, polyuria, or polydipsia   Hematologic/Lymphatic No easy bruising or bleeding. The patient denies any tender or palpable lymph nodes   Breasts No abnormal masses of breast, nipple discharge or pain. Respiratory No dyspnea on exertion, orthopnea, chest pain, cough or hemoptysis. Cardiovascular No anginal chest pain, irregular heart beat, tachycardia, palpitations or orthopnea. Gastrointestinal No nausea, vomiting, diarrhea, constipation, cramping, dysphagia, reflux, heartburn, GI bleeding, or early satiety. No change in bowel habits. Genitourinary (M) No hematuria, dysuria, increased frequency, urgency, hesitancy or incontinence. Musculoskeletal No joint pain, swelling or redness. No decreased range of motion. Integumentary No chronic rashes, inflammation, ulcerations, pruritus, petechiae, purpura, ecchymoses, or skin changes. Neurologic No headache, blurred vision, and no areas of focal weakness or numbness. Normal gait. No sensory problems. Psychiatric No insomnia, depression, victoria or mood swings. No psychotropic drugs.         Current Facility-Administered Medications:     sodium chloride (NS) flush 5-10 mL, 5-10 mL, IntraVENous, PRN, Suzi Benavidez MD    levoFLOXacin (LEVAQUIN) 750 mg in D5W IVPB, 750 mg, IntraVENous, Q24H, Suzi Benavidez MD, Last Rate: 100 mL/hr at 02/15/23 1715, 750 mg at 02/15/23 1715    insulin lispro (HUMALOG) injection, , SubCUTAneous, AC&HS, Suzi Benavidez MD, 10 Units at 02/15/23 1715    glucose chewable tablet 16 g, 4 Tablet, Oral, PRN, Dian Benavidez MD    glucagon (GLUCAGEN) injection 1 mg, 1 mg, IntraMUSCular, PRN, Dian Benavidez MD    0.9% sodium chloride infusion, 75 mL/hr, IntraVENous, CONTINUOUS, Suzi Benavidez MD, Last Rate: 75 mL/hr at 02/15/23 0818, 75 mL/hr at 02/15/23 0818    acetaminophen (TYLENOL) tablet 650 mg, 650 mg, Oral, Q6H PRN, 650 mg at 02/14/23 2353 **OR** acetaminophen (TYLENOL) suppository 650 mg, 650 mg, Rectal, Q6H PRN, Suzi Benavidez MD    polyethylene glycol (MIRALAX) packet 17 g, 17 g, Oral, DAILY PRN, Suzi Benavidez MD    ondansetron (ZOFRAN ODT) tablet 4 mg, 4 mg, Oral, Q8H PRN **OR** ondansetron (ZOFRAN) injection 4 mg, 4 mg, IntraVENous, Q6H PRN, Suzi Benavidez MD    enoxaparin (LOVENOX) injection 40 mg, 40 mg, SubCUTAneous, DAILY, Suzi Benavidez MD, 40 mg at 02/15/23 0816    albuterol-ipratropium (DUO-NEB) 2.5 MG-0.5 MG/3 ML, 3 mL, Nebulization, Q6H RT, Suzi Benavidez MD, 3 mL at 02/15/23 1934    budesonide-formoteroL (SYMBICORT) 160-4.5 mcg/actuation HFA inhaler 2 Puff, 2 Puff, Inhalation, BID, Suzi Benavidez MD, 2 Puff at 02/15/23 1935    piperacillin-tazobactam (ZOSYN) 3.375 g in 0.9% sodium chloride (MBP/ADV) 100 mL MBP, 3.375 g, IntraVENous, Q8H, Suzi Benavidez MD, Last Rate: 25 mL/hr at 02/15/23 2117, 3.375 g at 02/15/23 2117     Objective:     Vitals:    02/15/23 1933 02/15/23 1935 02/15/23 2000 02/15/23 2309   BP: (!) 153/80   132/68   Pulse: 76  78 72   Resp: 19   19   Temp: 97.6 °F (36.4 °C)   98.6 °F (37 °C)   SpO2: 95% 96%  96%   Weight:       Height:              Physical Exam:   Constitutional Alert, cooperative, oriented. Mood and affect appropriate. Appears close to chronological age. Well nourished. Well developed.    Head Normocephalic; no scars   Eyes Conjunctivae and sclerae are clear and without icterus. Pupils are reactive and equal.   ENMT Sinuses are nontender. No oral exudates, ulcers, masses, thrush or mucositis. Oropharynx clear. Tongue normal.   Neck Supple without masses or thyromegaly. No jugular venous distension. Hematologic/Lymphatic No petechiae or purpura. No tender or palpable lymph nodes in the cervical, supraclavicular, axillary or inguinal area. Respiratory Lungs are clear to auscultation without rhonchi or wheezing. Cardiovascular Regular rate and rhythm of heart without murmurs, gallops or rubs. Chest / Line Site Chest is symmetric with no chest wall deformities. Abdomen Non-tender, non-distended, no masses, ascites or hepatosplenomegaly. Good bowel sounds. No guarding or rebound tenderness. No pulsatile masses. Musculoskeletal No tenderness or swelling, normal range of motion without obvious weakness. Extremities No visible deformities, no cyanosis, clubbing or edema. Skin No rashes, scars, or lesions suggestive of malignancy. No petechiae, purpura, or ecchymoses. No excoriations. Neurologic No sensory or motor deficits, normal cerebellar function, normal gait, cranial nerves intact. Psychiatric Alert and oriented times three. Coherent speech. Verbalizes understanding of our discussions today.        Lab/Data Review:  Recent Labs     02/15/23  0726 02/13/23  1653   WBC 2.0* 2.6*   HGB 10.0* 12.5   HCT 31.2* 37.6    196     Recent Labs     02/15/23  0726 02/14/23  0905 02/13/23  1653    133* 126*   K 3.6 3.4* 3.4*    100 89*   CO2 26 29 28   * 211* 209*   BUN 9 9 14   CREA 0.68 0.93 0.97   CA 8.8 8.6 9.2   ALB 2.4* 2.8* 3.3*   TBILI 0.2 0.4 0.6   ALT 86* 51 38     Recent Labs     02/13/23  2238 02/13/23  1836   PH 7.43 7.47*   PCO2 46* 39   PO2 54* 70*   HCO3 30* 27*   FIO2  --  36.0     Recent Results (from the past 24 hour(s))   GLUCOSE, POC    Collection Time: 02/15/23  7:25 AM   Result Value Ref Range    Glucose (POC) 270 (H) 65 - 100 mg/dL    Performed by Sandro Lilly    CBC W/O DIFF    Collection Time: 02/15/23  7:26 AM   Result Value Ref Range    WBC 2.0 (L) 3.6 - 11.0 K/uL    RBC 3.54 (L) 3.80 - 5.20 M/uL    HGB 10.0 (L) 11.5 - 16.0 g/dL    HCT 31.2 (L) 35.0 - 47.0 %    MCV 88.1 80.0 - 99.0 FL    MCH 28.2 26.0 - 34.0 PG    MCHC 32.1 30.0 - 36.5 g/dL    RDW 13.5 11.5 - 14.5 %    PLATELET 730 592 - 755 K/uL    MPV 10.2 8.9 - 12.9 FL    NRBC 0.0 0.0  WBC    ABSOLUTE NRBC 0.00 0.00 - 9.95 K/uL   METABOLIC PANEL, COMPREHENSIVE    Collection Time: 02/15/23  7:26 AM   Result Value Ref Range    Sodium 137 136 - 145 mmol/L    Potassium 3.6 3.5 - 5.1 mmol/L    Chloride 106 97 - 108 mmol/L    CO2 26 21 - 32 mmol/L    Anion gap 5 5 - 15 mmol/L    Glucose 286 (H) 65 - 100 mg/dL    BUN 9 6 - 20 mg/dL    Creatinine 0.68 0.55 - 1.02 mg/dL    BUN/Creatinine ratio 13 12 - 20      eGFR >60 >60 ml/min/1.73m2    Calcium 8.8 8.5 - 10.1 mg/dL    Bilirubin, total 0.2 0.2 - 1.0 mg/dL    AST (SGOT) 74 (H) 15 - 37 U/L    ALT (SGPT) 86 (H) 12 - 78 U/L    Alk. phosphatase 50 45 - 117 U/L    Protein, total 7.0 6.4 - 8.2 g/dL    Albumin 2.4 (L) 3.5 - 5.0 g/dL    Globulin 4.6 (H) 2.0 - 4.0 g/dL    A-G Ratio 0.5 (L) 1.1 - 2.2     GLUCOSE, POC    Collection Time: 02/15/23 11:22 AM   Result Value Ref Range    Glucose (POC) 365 (H) 65 - 100 mg/dL    Performed by [address] [place] [address], POC    Collection Time: 02/15/23  5:03 PM   Result Value Ref Range    Glucose (POC) 346 (H) 65 - 100 mg/dL    Performed by [address], POC    Collection Time: 02/15/23  8:43 PM   Result Value Ref Range    Glucose (POC) 173 (H) 65 - 100 mg/dL    Performed by Abbi Guallpa         Radiology:   CT Results  (Last 48 hours)      None             Assessment and Plan:      Active Problems:    Respiratory failure with hypoxia ([facility] [address] (2/13/2023)      Pneumonia (2/13/2023)    Extensive stage small cell lung cancer:  -Started carboplatin, etoposide, atezolizumab on 2/6/2023  -White count is 2.0 today. Would expect ANC to decline due to recent chemotherapy. Check CBC with differential in AM  -Neutropenic precautions for ANC less than 1.0  -Follows with Dr. Clarence Yeager    Brain metastasis:  -Status post gamma knife treatment. Dyspnea:  -Secondary to COPD and possible postobstructive pneumonia  -On steroids and antibiotics. DVT prophylaxis:  -On Lovenox.       Signed By: Jill Cohen MD     February 15, 2023

## 2023-02-16 NOTE — PROGRESS NOTES
PULMONARY NOTE  [facility]    Name: Gabriela Bell MRN: [id number]   : 1960 Hospital: [facility]   Date: 2023  Admission date: 2023 Hospital Day: 4       HPI:     Hospital Problems  Never Reviewed            Codes Class Noted POA    Respiratory failure with hypoxia [facility]) ICD-10-CM: J96.91  ICD-9-CM: 518.81  2023 Unknown        Pneumonia ICD-10-CM: J18.9  ICD-9-CM: 290  2023 Unknown            [x] High complexity decision making was performed  [x] See my orders for details      Subjective/Initial History:     I was asked by Edson Booth MD to see Gabriela Bell  a 60 y.o.    female in consultation     Excerpts from admission 2023 or consult notes as follows:   77-year-old lady came to hospital because of shortness of breath and dyspnea, she was seen at the oncology office recommend to come to the hospital she is chronically on home oxygen history of lung cancer status post chemoradiation treatment she was short of breath came to the hospital now she is on 4 L nasal cannula she was also febrile temperature of 102 CAT scan of the chest was done which shows left basilar mass and perihilar infiltrate possible atelectasis she is weak and tired so admitted and pulmonary consult was called, she is complaining about diarrhea      Allergies   Allergen Reactions    Lisinopril Unknown (comments)        MAR reviewed and pertinent medications noted or modified as needed     Current Facility-Administered Medications   Medication    sodium chloride (NS) flush 5-10 mL    levoFLOXacin (LEVAQUIN) 750 mg in D5W IVPB    insulin lispro (HUMALOG) injection    glucose chewable tablet 16 g    glucagon (GLUCAGEN) injection 1 mg    0.9% sodium chloride infusion    acetaminophen (TYLENOL) tablet 650 mg    Or    acetaminophen (TYLENOL) suppository 650 mg    polyethylene glycol (MIRALAX) packet 17 g    ondansetron (ZOFRAN ODT) tablet 4 mg    Or    ondansetron (ZOFRAN) injection 4 mg    enoxaparin (LOVENOX) injection 40 mg    albuterol-ipratropium (DUO-NEB) 2.5 MG-0.5 MG/3 ML    budesonide-formoteroL (SYMBICORT) 160-4.5 mcg/actuation HFA inhaler 2 Puff    piperacillin-tazobactam (ZOSYN) 3.375 g in 0.9% sodium chloride (MBP/ADV) 100 mL MBP      Patient PCP: Park, MD Shonna  PMH:  has no past medical history on file. PSH:   has no past surgical history on file. FHX: family history is not on file. SHX:       ROS:    Review of system as per HPI and physical exam    Objective:     Vital Signs: Telemetry:    normal sinus rhythm Intake/Output:   Visit Vitals  BP (!) 143/79 (BP 1 Location: Left upper arm, BP Patient Position: Sitting) Comment: let nurse know   Pulse 64   Temp 98.5 °F (36.9 °C)   Resp 20   Ht 5' 6\" (1.676 m)   Wt 95.2 kg (209 lb 14.1 oz)   SpO2 94%   BMI 33.88 kg/m²       Temp (24hrs), Av.3 °F (36.8 °C), Min:97.6 °F (36.4 °C), Max:98.6 °F (37 °C)        O2 Device: Nasal cannula O2 Flow Rate (L/min): 2 l/min       Wt Readings from Last 4 Encounters:   02/15/23 95.2 kg (209 lb 14.1 oz)          Intake/Output Summary (Last 24 hours) at 2023 1006  Last data filed at 2/15/2023 1817  Gross per 24 hour   Intake 855 ml   Output --   Net 855 ml         Last shift:      No intake/output data recorded. Last 3 shifts:  1901 -  0700  In: 1095 [P.O.:1095]  Out: -        Physical Exam:     Physical Exam  Constitutional:       Appearance: She is obese. HENT:      Head: Normocephalic and atraumatic. Nose: Nose normal.      Mouth/Throat:      Mouth: Mucous membranes are moist.   Cardiovascular:      Rate and Rhythm: Normal rate and regular rhythm. Pulses: Normal pulses. Heart sounds: Normal heart sounds. Pulmonary:      Effort: Pulmonary effort is normal.      Breath sounds: Rhonchi and rales present. Abdominal:      General: Abdomen is flat. Bowel sounds are normal.      Palpations: Abdomen is soft.    Musculoskeletal:         General: Normal range of motion. Cervical back: Normal range of motion and neck supple. Skin:     General: Skin is warm. Neurological:      General: No focal deficit present. Mental Status: She is alert. Labs:    Recent Labs     02/15/23  0726 02/13/23  1653   WBC 2.0* 2.6*   HGB 10.0* 12.5    196       Recent Labs     02/15/23  0726 02/14/23  0905 02/13/23  1653    133* 126*   K 3.6 3.4* 3.4*    100 89*   CO2 26 29 28   * 211* 209*   BUN 9 9 14   CREA 0.68 0.93 0.97   CA 8.8 8.6 9.2   LAC  --  1.1 1.2   ALB 2.4* 2.8* 3.3*   ALT 86* 51 38       Recent Labs     02/13/23  2238 02/13/23  1836   PH 7.43 7.47*   PCO2 46* 39   PO2 54* 70*   HCO3 30* 27*   FIO2  --  36.0       No results for input(s): CPK, CKNDX, TROIQ in the last 72 hours. No lab exists for component: CPKMB  No results found for: BNPP, BNP   Lab Results   Component Value Date/Time    Culture result: No growth 1 day 02/13/2023 04:53 PM    Culture result: No growth 1 day 02/13/2023 04:53 PM   No results found for: TSH, TSHEXT, TSHEXT    Imaging:    CXR Results  (Last 48 hours)      None          Results from Hospital Encounter encounter on 02/13/23    XR CHEST PORT    Narrative  INDICATION: . Sepsis  Additional history:  COMPARISON: None. LIMITATIONS: Portable technique. Gavin Mirza FINDINGS: Single frontal view of the chest.  .  Lines/tubes/surgical: There is a port in the right chest with a subclavian  approach catheter that projects to terminate in the mid SVC. Cardiac monitor  leads overly the patient  Heart/mediastinum: Calcifications in the aortic arch. .  Lungs/pleura: Hazy opacity over the midlungs and bases with partially obscured  hemidiaphragms. Minimal linear opacities in both bases suggesting  scarring/atelectasis. No visible pneumothorax. Additional Comments: None. .    Impression  1. Bilateral pleural effusions with associated passive atelectasis and/or  consolidation.   2. Bibasilar opacities most suggestive of scarring/atelectasis. Developing  airspace disease cannot be excluded    Results from Hospital Encounter encounter on 02/13/23    CTA CHEST W OR W WO CONT    Narrative  EXAM:  CTA CHEST W OR W WO CONT  INDICATION:  hypoxia, hx Cancer. Additional history:  COMPARISON: Chest x-ray, 2/13/2023. Rosebud Raffaele TECHNIQUE:  Precontrast  images were obtained to localize the volume for acquisition. Multislice helical CT arteriography was performed from the diaphragm to the  thoracic inlet during uneventful rapid bolus intravenous contrast  administration. Lung and soft tissue windows were generated. Coronal and  sagittal images were generated and 3D post processing consisting of coronal  maximum intensity images was performed. CT dose reduction was achieved through use of a standardized protocol tailored  for this examination and automatic exposure control for dose modulation. Rosebud Raffaele FINDINGS:  CHEST:  Chest wall/thoracic inlet: Within normal limits. Thyroid: Within normal limits. Mediastinum/manolo: Bilateral hilar adenopathy. Subcarinal adenopathy Calcified  lymph nodes in the right hilum and in the mediastinum. Patulous esophagus. Heart/vessels: There is a port in the right chest with a right subclavian  approach catheter that terminates in the mid SVC. Coronary artery  calcifications: Not able to be assessed  Lungs/Pleura: Respiratory motion limiting visualization. Mass in the left lower  lobe at the left hemidiaphragm laterally measuring 2.7 x 3.9 x 3 cm. Perihilar  opacities in both lungs. Bibasilar opacities. .  INCIDENTALLY IMAGED ABDOMEN:  Status post cholecystectomy  . MSK:  Minimal degenerative change in the mid thoracic spine. .    Impression  Respiratory motion limits evaluation  1. No visualized pulmonary embolus. 2. Perihilar and bibasilar opacity suggesting airspace disease. 3. Left basilar mass.   4. 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

## 2023-02-17 VITALS
SYSTOLIC BLOOD PRESSURE: 136 MMHG | TEMPERATURE: 98.5 F | HEART RATE: 75 BPM | WEIGHT: 209.66 LBS | OXYGEN SATURATION: 96 % | HEIGHT: 66 IN | DIASTOLIC BLOOD PRESSURE: 76 MMHG | BODY MASS INDEX: 33.69 KG/M2 | RESPIRATION RATE: 18 BRPM

## 2023-02-17 LAB
GLUCOSE BLD STRIP.AUTO-MCNC: 165 MG/DL (ref 65–100)
PERFORMED BY, TECHID: ABNORMAL

## 2023-02-17 PROCEDURE — 74011000258 HC RX REV CODE- 258: Performed by: FAMILY MEDICINE

## 2023-02-17 PROCEDURE — 74011250636 HC RX REV CODE- 250/636: Performed by: FAMILY MEDICINE

## 2023-02-17 PROCEDURE — 77010033678 HC OXYGEN DAILY

## 2023-02-17 PROCEDURE — 74011250637 HC RX REV CODE- 250/637: Performed by: FAMILY MEDICINE

## 2023-02-17 PROCEDURE — 94761 N-INVAS EAR/PLS OXIMETRY MLT: CPT

## 2023-02-17 PROCEDURE — 82962 GLUCOSE BLOOD TEST: CPT

## 2023-02-17 RX ORDER — IPRATROPIUM BROMIDE AND ALBUTEROL SULFATE 2.5; .5 MG/3ML; MG/3ML
3 SOLUTION RESPIRATORY (INHALATION)
Status: DISCONTINUED | OUTPATIENT
Start: 2023-02-17 | End: 2023-02-17 | Stop reason: HOSPADM

## 2023-02-17 RX ORDER — GUAIFENESIN 600 MG/1
600 TABLET, EXTENDED RELEASE ORAL EVERY 12 HOURS
Status: DISCONTINUED | OUTPATIENT
Start: 2023-02-17 | End: 2023-02-17 | Stop reason: HOSPADM

## 2023-02-17 RX ORDER — BUDESONIDE AND FORMOTEROL FUMARATE DIHYDRATE 160; 4.5 UG/1; UG/1
2 AEROSOL RESPIRATORY (INHALATION) 2 TIMES DAILY
Qty: 10.2 G | Refills: 2 | Status: SHIPPED | OUTPATIENT
Start: 2023-02-17

## 2023-02-17 RX ORDER — HYDROCHLOROTHIAZIDE 25 MG/1
25 TABLET ORAL DAILY
Status: DISCONTINUED | OUTPATIENT
Start: 2023-02-17 | End: 2023-02-17 | Stop reason: HOSPADM

## 2023-02-17 RX ORDER — AMOXICILLIN AND CLAVULANATE POTASSIUM 875; 125 MG/1; MG/1
1 TABLET, FILM COATED ORAL 2 TIMES DAILY
Qty: 20 TABLET | Refills: 0 | Status: SHIPPED | OUTPATIENT
Start: 2023-02-17

## 2023-02-17 RX ORDER — AMLODIPINE BESYLATE 5 MG/1
10 TABLET ORAL DAILY
Status: DISCONTINUED | OUTPATIENT
Start: 2023-02-17 | End: 2023-02-17 | Stop reason: HOSPADM

## 2023-02-17 RX ORDER — IPRATROPIUM BROMIDE AND ALBUTEROL SULFATE 2.5; .5 MG/3ML; MG/3ML
3 SOLUTION RESPIRATORY (INHALATION)
Qty: 3 EACH | Refills: 2 | Status: SHIPPED | OUTPATIENT
Start: 2023-02-17

## 2023-02-17 RX ADMIN — HYDROCHLOROTHIAZIDE 25 MG: 25 TABLET ORAL at 09:48

## 2023-02-17 RX ADMIN — GUAIFENESIN 600 MG: 600 TABLET, EXTENDED RELEASE ORAL at 11:15

## 2023-02-17 RX ADMIN — AMLODIPINE BESYLATE 10 MG: 5 TABLET ORAL at 09:48

## 2023-02-17 RX ADMIN — PIPERACILLIN AND TAZOBACTAM 3.38 G: 3; .375 INJECTION, POWDER, FOR SOLUTION INTRAVENOUS at 04:19

## 2023-02-17 NOTE — PROGRESS NOTES
PULMONARY NOTE  [facility]    Name: Rula Cope MRN: [id number]   : 1960 Hospital: [facility]   Date: 2023  Admission date: 2023 Hospital Day: 5       HPI:     Hospital Problems  Never Reviewed            Codes Class Noted POA    Respiratory failure with hypoxia [facility]) ICD-10-CM: J96.91  ICD-9-CM: 518.81  2023 Unknown        Pneumonia ICD-10-CM: J18.9  ICD-9-CM: 218  2023 Unknown            [x] High complexity decision making was performed  [x] See my orders for details      Subjective/Initial History:     I was asked by Mirna Campbell MD to see Rula Cope  a 60 y.o.    female in consultation     Excerpts from admission 2023 or consult notes as follows:   65-year-old lady came to hospital because of shortness of breath and dyspnea, she was seen at the oncology office recommend to come to the hospital she is chronically on home oxygen history of lung cancer status post chemoradiation treatment she was short of breath came to the hospital now she is on 4 L nasal cannula she was also febrile temperature of 102 CAT scan of the chest was done which shows left basilar mass and perihilar infiltrate possible atelectasis she is weak and tired so admitted and pulmonary consult was called, she is complaining about diarrhea      Allergies   Allergen Reactions    Lisinopril Unknown (comments)        MAR reviewed and pertinent medications noted or modified as needed     Current Facility-Administered Medications   Medication    albuterol-ipratropium (DUO-NEB) 2.5 MG-0.5 MG/3 ML    amLODIPine (NORVASC) tablet 10 mg    hydroCHLOROthiazide (HYDRODIURIL) tablet 25 mg    sodium chloride (NS) flush 5-10 mL    levoFLOXacin (LEVAQUIN) 750 mg in D5W IVPB    insulin lispro (HUMALOG) injection    glucose chewable tablet 16 g    glucagon (GLUCAGEN) injection 1 mg    acetaminophen (TYLENOL) tablet 650 mg    Or    acetaminophen (TYLENOL) suppository 650 mg polyethylene glycol (MIRALAX) packet 17 g    ondansetron (ZOFRAN ODT) tablet 4 mg    Or    ondansetron (ZOFRAN) injection 4 mg    enoxaparin (LOVENOX) injection 40 mg    budesonide-formoteroL (SYMBICORT) 160-4.5 mcg/actuation HFA inhaler 2 Puff    piperacillin-tazobactam (ZOSYN) 3.375 g in 0.9% sodium chloride (MBP/ADV) 100 mL MBP      Patient PCP: Shonna Nick MD  PMH:  has no past medical history on file. PSH:   has no past surgical history on file. FHX: family history is not on file. SHX:       ROS:    Review of system as per HPI and physical exam    Objective:     Vital Signs: Telemetry:    normal sinus rhythm Intake/Output:   Visit Vitals  BP (!) 179/104 (BP 1 Location: Right upper arm, BP Patient Position: Sitting) Comment: RN notified   Pulse 93   Temp 97.7 °F (36.5 °C)   Resp 18   Ht 5' 6\" (1.676 m)   Wt 95.1 kg (209 lb 10.5 oz)   SpO2 97%   BMI 33.84 kg/m²       Temp (24hrs), Av.3 °F (36.8 °C), Min:97.7 °F (36.5 °C), Max:98.9 °F (37.2 °C)        O2 Device: Nasal cannula O2 Flow Rate (L/min): 2 l/min       Wt Readings from Last 4 Encounters:   23 95.1 kg (209 lb 10.5 oz)        No intake or output data in the 24 hours ending 23 0910      Last shift:      No intake/output data recorded. Last 3 shifts: No intake/output data recorded. Physical Exam:     Physical Exam  Constitutional:       Appearance: She is obese. HENT:      Head: Normocephalic and atraumatic. Nose: Nose normal.      Mouth/Throat:      Mouth: Mucous membranes are moist.   Cardiovascular:      Rate and Rhythm: Normal rate and regular rhythm. Pulses: Normal pulses. Heart sounds: Normal heart sounds. Pulmonary:      Effort: Pulmonary effort is normal.      Breath sounds: Rhonchi and rales present. Abdominal:      General: Abdomen is flat. Bowel sounds are normal.      Palpations: Abdomen is soft. Musculoskeletal:         General: Normal range of motion.       Cervical back: Normal range of motion and neck supple. Skin:     General: Skin is warm. Neurological:      General: No focal deficit present. Mental Status: She is alert. Labs:    Recent Labs     02/16/23  1033 02/15/23  0726   WBC 3.7 2.0*   HGB 10.3* 10.0*    158       Recent Labs     02/16/23  1033 02/15/23  0726    137   K 2.9* 3.6    106   CO2 30 26   * 286*   BUN 11 9   CREA 0.69 0.68   CA 9.0 8.8   ALB 2.7* 2.4*   * 86*       No results for input(s): PH, PCO2, PO2, HCO3, FIO2 in the last 72 hours. No results for input(s): CPK, CKNDX, TROIQ in the last 72 hours. No lab exists for component: CPKMB  No results found for: BNPP, BNP   Lab Results   Component Value Date/Time    Culture result: No growth 2 days 02/13/2023 04:53 PM    Culture result: No growth 2 days 02/13/2023 04:53 PM   No results found for: TSH, TSHEXT, TSHEXT    Imaging:    CXR Results  (Last 48 hours)      None          Results from Hospital Encounter encounter on 02/13/23    XR CHEST PORT    Narrative  INDICATION: . Sepsis  Additional history:  COMPARISON: None. LIMITATIONS: Portable technique. Tunde Jorgensen FINDINGS: Single frontal view of the chest.  .  Lines/tubes/surgical: There is a port in the right chest with a subclavian  approach catheter that projects to terminate in the mid SVC. Cardiac monitor  leads overly the patient  Heart/mediastinum: Calcifications in the aortic arch. .  Lungs/pleura: Hazy opacity over the midlungs and bases with partially obscured  hemidiaphragms. Minimal linear opacities in both bases suggesting  scarring/atelectasis. No visible pneumothorax. Additional Comments: None. .    Impression  1. Bilateral pleural effusions with associated passive atelectasis and/or  consolidation. 2. Bibasilar opacities most suggestive of scarring/atelectasis.  Developing  airspace disease cannot be excluded    Results from Hospital Encounter encounter on 02/13/23    CTA CHEST W OR W WO CONT    Narrative  EXAM:  CTA CHEST W OR W WO CONT  INDICATION:  hypoxia, hx Cancer. Additional history:  COMPARISON: Chest x-ray, 2/13/2023. Minnie Briones TECHNIQUE:  Precontrast  images were obtained to localize the volume for acquisition. Multislice helical CT arteriography was performed from the diaphragm to the  thoracic inlet during uneventful rapid bolus intravenous contrast  administration. Lung and soft tissue windows were generated. Coronal and  sagittal images were generated and 3D post processing consisting of coronal  maximum intensity images was performed. CT dose reduction was achieved through use of a standardized protocol tailored  for this examination and automatic exposure control for dose modulation. Minnie Briones FINDINGS:  CHEST:  Chest wall/thoracic inlet: Within normal limits. Thyroid: Within normal limits. Mediastinum/manolo: Bilateral hilar adenopathy. Subcarinal adenopathy Calcified  lymph nodes in the right hilum and in the mediastinum. Patulous esophagus. Heart/vessels: There is a port in the right chest with a right subclavian  approach catheter that terminates in the mid SVC. Coronary artery  calcifications: Not able to be assessed  Lungs/Pleura: Respiratory motion limiting visualization. Mass in the left lower  lobe at the left hemidiaphragm laterally measuring 2.7 x 3.9 x 3 cm. Perihilar  opacities in both lungs. Bibasilar opacities. .  INCIDENTALLY IMAGED ABDOMEN:  Status post cholecystectomy  . MSK:  Minimal degenerative change in the mid thoracic spine. .    Impression  Respiratory motion limits evaluation  1. No visualized pulmonary embolus. 2. Perihilar and bibasilar opacity suggesting airspace disease. 3. Left basilar mass.   4. Incidental findings as above        IMPRESSION:   Acute on chronic hypoxic respiratory failure  Chronic Obstructive Pulmonary Disease   History of stage IV small cell lung cancer  History of mets to the brain status post gamma knife  Diarrhea resolved  Postobstructive pneumonia  Hyponatremia and hypokalemia corrected      RECOMMENDATIONS/PLAN:     50-year-old lady with stage IV small cell lung cancer  She is on chronically on home oxygen we will continue to wean to her baseline oxygen  Patient is on Zosyn and Levaquin  Diarrhea resolved  Discharge planning                 Bon Vallecillo MD

## 2023-02-17 NOTE — DISCHARGE SUMMARY
Discharge Summary       PATIENT ID: Steve Verduzco  MRN: [id number]   YOB: 1960    DATE OF ADMISSION: 2/13/2023  4:03 PM    DATE OF DISCHARGE:   PRIMARY CARE PROVIDER: Shonna Nick MD     ATTENDING PHYSICIAN: Rody Benavidez  DISCHARGING PROVIDER: Rody Benavidez      CONSULTATIONS: IP CONSULT TO PULMONOLOGY  IP CONSULT TO HEMATOLOGY    PROCEDURES/SURGERIES: * No surgery found *    ADMITTING DIAGNOSES:    Patient Active Problem List    Diagnosis Date Noted    Respiratory failure with hypoxia ([facility] [address] 02/13/2023    Pneumonia 02/13/2023       DISCHARGE DIAGNOSES / PLAN:      Gram negative PNA    Acute hypoxic respiratory failure on 2L nasal cannula O2 sat 94%       History of lung cancer       Type 2 diabetes       Hypertension        Hypokalemia        Diarrhea (resolved)     Hyponatremia(resolved): DISCHARGE MEDICATIONS:  Current Discharge Medication List        START taking these medications    Details   albuterol-ipratropium (DUO-NEB) 2.5 mg-0.5 mg/3 ml nebu 3 mL by Nebulization route every six (6) hours as needed for Wheezing. Qty: 3 Each, Refills: 2  Start date: 2/17/2023      budesonide-formoteroL (SYMBICORT) 160-4.5 mcg/actuation HFAA Take 2 Puffs by inhalation two (2) times a day. Qty: 10.2 g, Refills: 2  Start date: 2/17/2023      amoxicillin-clavulanate (Augmentin) 875-125 mg per tablet Take 1 Tablet by mouth two (2) times a day. Qty: 20 Tablet, Refills: 0  Start date: 2/17/2023           CONTINUE these medications which have NOT CHANGED    Details   amLODIPine (NORVASC) 10 mg tablet Take 10 mg by mouth daily. hydroCHLOROthiazide (HYDRODIURIL) 25 mg tablet Take 25 mg by mouth daily. NOTIFY YOUR PHYSICIAN FOR ANY OF THE FOLLOWING:   Fever over 101 degrees for 24 hours. Chest pain, shortness of breath, fever, chills, nausea, vomiting, diarrhea, change in mentation, falling, weakness, bleeding. Severe pain or pain not relieved by medications.   Or, any other signs or symptoms that you may have questions about. DISPOSITION:  x  Home With:   OT  PT  HH  RN       Long term SNF/Inpatient Rehab    Independent/assisted living    Hospice    Other:       PATIENT CONDITION AT DISCHARGE: Stable      PHYSICAL EXAMINATION AT DISCHARGE:  General:          Alert, cooperative, no distress, appears stated age. HEENT:           Atraumatic, anicteric sclerae, pink conjunctivae                          No oral ulcers, mucosa moist, throat clear, dentition fair  Neck:               Supple, symmetrical  Lungs:             Clear to auscultation bilaterally. No Wheezing or Rhonchi. No rales. Chest wall:      No tenderness  No Accessory muscle use. Heart:              Regular  rhythm,  No  murmur   No edema  Abdomen:        Soft, non-tender. Not distended. Bowel sounds normal  Extremities:     No cyanosis. No clubbing,                            Skin turgor normal, Capillary refill normal  Skin:                Not pale. Not Jaundiced  No rashes   Psych:             Not anxious or agitated. Neurologic:      Alert, moves all extremities, answers questions appropriately and responds to commands     CTA CHEST W OR W WO CONT   Final Result   Respiratory motion limits evaluation   1. No visualized pulmonary embolus. 2. Perihilar and bibasilar opacity suggesting airspace disease. 3. Left basilar mass. 4. Incidental findings as above      XR CHEST PORT   Final Result   1. Bilateral pleural effusions with associated passive atelectasis and/or   consolidation. 2. Bibasilar opacities most suggestive of scarring/atelectasis.  Developing   airspace disease cannot be excluded                  Recent Results (from the past 24 hour(s))   GLUCOSE, POC    Collection Time: 02/16/23  5:31 PM   Result Value Ref Range    Glucose (POC) 134 (H) 65 - 100 mg/dL    Performed by [address] [place] [address]    Collection Time: 02/16/23  8:18 PM   Result Value Ref Range    Glucose (POC) 185 (H) 65 - 100 mg/dL    Performed by Elba López    GLUCOSE, POC    Collection Time: 02/17/23  7:43 AM   Result Value Ref Range    Glucose (POC) 165 (H) 65 - 100 mg/dL    Performed by [address]:    Patient is a 60y.o. year old female with a past medical history of type 2 diabetes, hypertension, history of lung cancer status post chemoradiation on home oxygen PRN who presented to emergency room complaining of shortness of breath with associated decreased appetite for last 4 days. In the ED, pt had a temperature of 102. 1. Her oxygen saturation was 75% initially. DuoNeb was given by EMS and her saturation improved to 94%. CT scan of the chest done shows perihilar basilar opacity suggestive of airspace disease. Pt was negative for flu and COVID. She denies any fever, chills or chest pain. CTA Chest shows  1. No visualized pulmonary embolus. 2. Perihilar and bibasilar opacity suggesting airspace disease. 3. Left basilar mass. 2/14  She notes that the lightheadedness and shortness of breath that she had upon presentation has resolved. Pt mentions that she had a recently diagnosis of DM. Pt's urine had >1000 mg/dL of glucose and her potassium is 3.4. Pt was found to have gram negative PNA. 2/15  Pt reports that she feels dizzy today. She mentions that she has been having 3-4 episodes of diarrhea daily. She adds that she is having some abdominal pain but it is relieved with Tylenol. Pt notes that she has still been wheezing, but the nebulizer treatment is helping. She is on 3L nasal cannula and her O2 sat is 94%. She was seen by hematology yesterday. Pt has small cell lung cancer and was started on carboplatin, etoposide and atezolizumab on 2/6/23. Glucose 286 today. 2/16  Pt is still having bowel movements throughout the night but reports that they are more solid now. Pt is still wheezing diffusely, with some improvement in the right apex. Pt is still on 2L nasal cannula.  She mentions that she feels mucus in her chest but cannot cough it up. She is asking for Mucinex. Pt's blood pressure has been elevated throughout admission as she has not been given any of her home medications. She states that she is on HCTZ 25 mg and amlodipine. 2/17  /104 this AM. Was started on 10mg amlodipine and 25 mg HCTZ, her home medications. Potassium is 2.9. ALT and AST elevated. She appears agitated today, wanting to go home. She was seen by PT and OT and they have cleared her.      Patient breathing much better want to go home today yesterday potassium was 2.9 was replaced today labs are pending if stable to discharge home and follow-up with oncologist      Signed:   Juan Liu MD  2/17/2023  11:57 AM

## 2023-02-17 NOTE — DISCHARGE INSTRUCTIONS
Discharge Instructions       PATIENT ID: Netta Harada  MRN: [id number]   YOB: 1960    DATE OF ADMISSION: [unfilled]    DATE OF DISCHARGE: 2/17/2023    PRIMARY CARE PROVIDER: @PCP@     ATTENDING PHYSICIAN: [unfilled]  DISCHARGING PROVIDER: Robel Hodge MD    To contact this individual call [phone] and ask the  to page. If unavailable ask to be transferred the Adult Hospitalist Department. DISCHARGE DIAGNOSES pnemonia    CONSULTATIONS: [unfilled]    PROCEDURES/SURGERIES: * No surgery found *    PENDING TEST RESULTS:   At the time of discharge the following test results are still pending: None    [facility]   [facility]     ADDITIONAL CARE RECOMMENDATIONS: Follow-up with oncologist    DIET: Resume previous diet      ACTIVITY: Activity as tolerated    Wound care: Wound Care Order: submitted to [facility] Please view [email or web address]    EQUIPMENT needed: ***      DISCHARGE MEDICATIONS:   See Medication Reconciliation Form    It is important that you take the medication exactly as they are prescribed. Keep your medication in the bottles provided by the pharmacist and keep a list of the medication names, dosages, and times to be taken in your wallet. Do not take other medications without consulting your doctor. NOTIFY YOUR PHYSICIAN FOR ANY OF THE FOLLOWING:   Fever over 101 degrees for 24 hours. Chest pain, shortness of breath, fever, chills, nausea, vomiting, diarrhea, change in mentation, falling, weakness, bleeding. Severe pain or pain not relieved by medications. Or, any other signs or symptoms that you may have questions about.       DISPOSITION:    Home With:   OT  PT  HH  RN       SNF/Inpatient Rehab/LTAC    Independent/assisted living    Hospice    Other:         PROBLEM LIST Updated:  Yes ***       Signed:   Robel Hodge MD  2/17/2023  11:56 AM

## 2023-02-17 NOTE — PROGRESS NOTES
CM met with patient at bedside to discuss discharge plan. CM explained to Ms. Bermudez that therapy recommendations are for home/self. Patient verbalized understanding. CM informed her if she needed [facility] in the future to follow up with her PCP or her Oncology specialist. Patient verbalized understanding. Patient has 4 LPM via nasal cannula continuous oxygen at home. Patient states she will have her daughter pick her up and bring her home oxygen to the hospital when discharged. Patient states she wants to go home today.

## 2023-02-17 NOTE — PROGRESS NOTES
VSS. Patient given discharge instructions. Medications and follow-up appointments reviewed. IV and Telemetry removed. Case management, provider, and primary nurse aware of discharge. Discharge plan of care/case management plan validated with provider discharge order. Patient taken out to car via wheelchair.

## 2023-02-17 NOTE — PROGRESS NOTES
General Daily Progress Note          Patient Name:   Xi Nance       YOB: 1960       Age:  60 y.o. Admit Date: 2/13/2023      Subjective:     CHIEF COMPLAINT:      Shortness of breath     HISTORY OF PRESENT ILLNESS:        Patient is a 60y.o. year old female with a past medical history of type 2 diabetes, hypertension, history of lung cancer status post chemoradiation on home oxygen PRN who presented to emergency room complaining of shortness of breath with associated decreased appetite for last 4 days. In the ED, pt had a temperature of 102. 1. Her oxygen saturation was 75% initially. DuoNeb was given by EMS and her saturation improved to 94%. CT scan of the chest done shows perihilar basilar opacity suggestive of airspace disease. Pt was negative for flu and COVID. She denies any fever, chills or chest pain. CTA Chest shows  1. No visualized pulmonary embolus. 2. Perihilar and bibasilar opacity suggesting airspace disease. 3. Left basilar mass. 2/14  She notes that the lightheadedness and shortness of breath that she had upon presentation has resolved. Pt mentions that she had a recently diagnosis of DM. Pt's urine had >1000 mg/dL of glucose and her potassium is 3.4. Pt was found to have gram negative PNA. 2/15  Pt reports that she feels dizzy today. She mentions that she has been having 3-4 episodes of diarrhea daily. She adds that she is having some abdominal pain but it is relieved with Tylenol. Pt notes that she has still been wheezing, but the nebulizer treatment is helping. She is on 3L nasal cannula and her O2 sat is 94%. She was seen by hematology yesterday. Pt has small cell lung cancer and was started on carboplatin, etoposide and atezolizumab on 2/6/23. Glucose 286 today. 2/16  Pt is still having bowel movements throughout the night but reports that they are more solid now. Pt is still wheezing diffusely, with some improvement in the right apex.  Pt is still on 2L nasal cannula. She mentions that she feels mucus in her chest but cannot cough it up. She is asking for Mucinex. Pt's blood pressure has been elevated throughout admission as she has not been given any of her home medications. She states that she is on HCTZ 25 mg and amlodipine. 2/17  /104 this AM. Was started on 10mg amlodipine and 25 mg HCTZ, her home medications. Potassium is 2.9. ALT and AST elevated. She appears agitated today, wanting to go home. She was seen by PT and OT and they have cleared her. Objective:     Visit Vitals  BP (!) 149/79 (BP 1 Location: Right upper arm, BP Patient Position: Semi fowlers)   Pulse 70   Temp 98.4 °F (36.9 °C)   Resp 18   Ht 5' 6\" (1.676 m)   Wt 209 lb 10.5 oz (95.1 kg)   SpO2 95%   BMI 33.84 kg/m²        Recent Results (from the past 24 hour(s))   METABOLIC PANEL, COMPREHENSIVE    Collection Time: 02/16/23 10:33 AM   Result Value Ref Range    Sodium 140 136 - 145 mmol/L    Potassium 2.9 (L) 3.5 - 5.1 mmol/L    Chloride 106 97 - 108 mmol/L    CO2 30 21 - 32 mmol/L    Anion gap 4 (L) 5 - 15 mmol/L    Glucose 163 (H) 65 - 100 mg/dL    BUN 11 6 - 20 mg/dL    Creatinine 0.69 0.55 - 1.02 mg/dL    BUN/Creatinine ratio 16 12 - 20      eGFR >60 >60 ml/min/1.73m2    Calcium 9.0 8.5 - 10.1 mg/dL    Bilirubin, total 0.2 0.2 - 1.0 mg/dL    AST (SGOT) 76 (H) 15 - 37 U/L    ALT (SGPT) 107 (H) 12 - 78 U/L    Alk.  phosphatase 50 45 - 117 U/L    Protein, total 7.1 6.4 - 8.2 g/dL    Albumin 2.7 (L) 3.5 - 5.0 g/dL    Globulin 4.4 (H) 2.0 - 4.0 g/dL    A-G Ratio 0.6 (L) 1.1 - 2.2     CBC WITH AUTOMATED DIFF    Collection Time: 02/16/23 10:33 AM   Result Value Ref Range    WBC 3.7 3.6 - 11.0 K/uL    RBC 3.55 (L) 3.80 - 5.20 M/uL    HGB 10.3 (L) 11.5 - 16.0 g/dL    HCT 31.5 (L) 35.0 - 47.0 %    MCV 88.7 80.0 - 99.0 FL    MCH 29.0 26.0 - 34.0 PG    MCHC 32.7 30.0 - 36.5 g/dL    RDW 13.7 11.5 - 14.5 %    PLATELET 599 709 - 887 K/uL    MPV 10.1 8.9 - 12.9 FL    NRBC 0.0 0.0  WBC    ABSOLUTE NRBC 0.00 0.00 - 0.01 K/uL    NEUTROPHILS 43 32 - 75 %    BAND NEUTROPHILS 2 0 - 6 %    LYMPHOCYTES 54 (H) 12 - 49 %    MONOCYTES 1 (L) 5 - 13 %    EOSINOPHILS 0 0 - 7 %    BASOPHILS 0 0 - 1 %    IMMATURE GRANULOCYTES 0 %    ABS. NEUTROPHILS 1.7 (L) 1.8 - 8.0 K/UL    ABS. LYMPHOCYTES 2.0 0.8 - 3.5 K/UL    ABS. MONOCYTES 0.0 0.0 - 1.0 K/UL    ABS. EOSINOPHILS 0.0 0.0 - 0.4 K/UL    ABS. BASOPHILS 0.0 0.0 - 0.1 K/UL    ABS. IMM. GRANS. 0.0 K/UL    DF Smear Scanned      PLATELET COMMENTS Large Platelets      RBC COMMENTS Normocytic, Normochromic      WBC COMMENTS Reactive Lymphs      Pathologist review SENT FOR PATHOLOGIST REVIEW    GLUCOSE, POC    Collection Time: 02/16/23 11:17 AM   Result Value Ref Range    Glucose (POC) 244 (H) 65 - 100 mg/dL    Performed by [employer] franc[employer] , POC    Collection Time: 02/16/23  5:31 PM   Result Value Ref Range    Glucose (POC) 134 (H) 65 - 100 mg/dL    Performed by [address] [place] [address], POC    Collection Time: 02/16/23  8:18 PM   Result Value Ref Range    Glucose (POC) 185 (H) 65 - 100 mg/dL    Performed by Corrine Fish        Review of Systems    Constitutional: Negative for chills and fever. HENT: Negative. Eyes: Negative. Respiratory: Shortness of breath, wheezing, productive cough. Cardiovascular: Negative. Gastrointestinal: Positive for diarrhea. Skin: Negative. Neurological: appears agitated. Physical Exam:      Constitutional: pt is oriented to person, place, and time. HENT: on nasal cannula 2L  Head: Normocephalic and atraumatic. Eyes: Pupils are equal, round, and reactive to light. EOM are normal.   Cardiovascular: Normal rate, regular rhythm and normal heart sounds. Pulmonary/Chest: Moderate wheezing diffusely. Abdominal: Soft. Bowel sounds are normal. There is no abdominal tenderness. There is no rebound and no guarding. Musculoskeletal: Normal range of motion.    Neurological: pt is alert and oriented to person, place, and time. CTA CHEST W OR W WO CONT   Final Result   Respiratory motion limits evaluation   1. No visualized pulmonary embolus. 2. Perihilar and bibasilar opacity suggesting airspace disease. 3. Left basilar mass. 4. Incidental findings as above      XR CHEST PORT   Final Result   1. Bilateral pleural effusions with associated passive atelectasis and/or   consolidation. 2. Bibasilar opacities most suggestive of scarring/atelectasis. Developing   airspace disease cannot be excluded                  Recent Results (from the past 24 hour(s))   METABOLIC PANEL, COMPREHENSIVE    Collection Time: 02/16/23 10:33 AM   Result Value Ref Range    Sodium 140 136 - 145 mmol/L    Potassium 2.9 (L) 3.5 - 5.1 mmol/L    Chloride 106 97 - 108 mmol/L    CO2 30 21 - 32 mmol/L    Anion gap 4 (L) 5 - 15 mmol/L    Glucose 163 (H) 65 - 100 mg/dL    BUN 11 6 - 20 mg/dL    Creatinine 0.69 0.55 - 1.02 mg/dL    BUN/Creatinine ratio 16 12 - 20      eGFR >60 >60 ml/min/1.73m2    Calcium 9.0 8.5 - 10.1 mg/dL    Bilirubin, total 0.2 0.2 - 1.0 mg/dL    AST (SGOT) 76 (H) 15 - 37 U/L    ALT (SGPT) 107 (H) 12 - 78 U/L    Alk. phosphatase 50 45 - 117 U/L    Protein, total 7.1 6.4 - 8.2 g/dL    Albumin 2.7 (L) 3.5 - 5.0 g/dL    Globulin 4.4 (H) 2.0 - 4.0 g/dL    A-G Ratio 0.6 (L) 1.1 - 2.2     CBC WITH AUTOMATED DIFF    Collection Time: 02/16/23 10:33 AM   Result Value Ref Range    WBC 3.7 3.6 - 11.0 K/uL    RBC 3.55 (L) 3.80 - 5.20 M/uL    HGB 10.3 (L) 11.5 - 16.0 g/dL    HCT 31.5 (L) 35.0 - 47.0 %    MCV 88.7 80.0 - 99.0 FL    MCH 29.0 26.0 - 34.0 PG    MCHC 32.7 30.0 - 36.5 g/dL    RDW 13.7 11.5 - 14.5 %    PLATELET 721 368 - 667 K/uL    MPV 10.1 8.9 - 12.9 FL    NRBC 0.0 0.0  WBC    ABSOLUTE NRBC 0.00 0.00 - 0.01 K/uL    NEUTROPHILS 43 32 - 75 %    BAND NEUTROPHILS 2 0 - 6 %    LYMPHOCYTES 54 (H) 12 - 49 %    MONOCYTES 1 (L) 5 - 13 %    EOSINOPHILS 0 0 - 7 %    BASOPHILS 0 0 - 1 %    IMMATURE GRANULOCYTES 0 %    ABS. NEUTROPHILS 1.7 (L) 1.8 - 8.0 K/UL    ABS. LYMPHOCYTES 2.0 0.8 - 3.5 K/UL    ABS. MONOCYTES 0.0 0.0 - 1.0 K/UL    ABS. EOSINOPHILS 0.0 0.0 - 0.4 K/UL    ABS. BASOPHILS 0.0 0.0 - 0.1 K/UL    ABS. IMM. GRANS. 0.0 K/UL    DF Smear Scanned      PLATELET COMMENTS Large Platelets      RBC COMMENTS Normocytic, Normochromic      WBC COMMENTS Reactive Lymphs      Pathologist review SENT FOR PATHOLOGIST REVIEW    GLUCOSE, POC    Collection Time: 02/16/23 11:17 AM   Result Value Ref Range    Glucose (POC) 244 (H) 65 - 100 mg/dL    Performed by [employer]), POC    Collection Time: 02/16/23  5:31 PM   Result Value Ref Range    Glucose (POC) 134 (H) 65 - 100 mg/dL    Performed by [address] [place] [address], POC    Collection Time: 02/16/23  8:18 PM   Result Value Ref Range    Glucose (POC) 185 (H) 65 - 100 mg/dL    Performed by Lela Ty        Results       Procedure Component Value Units Date/Time    CULTURE, BLOOD #1 [[id number]]     Order Status: Canceled Specimen: Blood     CULTURE, BLOOD #2 [[id number]]     Order Status: Canceled Specimen: Blood     CULTURE, BLOOD [[id number]] Collected: 02/13/23 1653    Order Status: Completed Specimen: Blood Updated: 02/16/23 1007     Special Requests: No Special Requests        Culture result: No growth 2 days       CULTURE, BLOOD [[id number]] Collected: 02/13/23 1653    Order Status: Completed Specimen: Blood Updated: 02/16/23 1007     Special Requests: No Special Requests        Culture result: No growth 2 days       COVID-19 RAPID TEST [[id number]] Collected: 02/13/23 1653    Order Status: Completed Specimen: Nasopharyngeal Updated: 02/13/23 1745     COVID-19 rapid test Not Detected        Comment: Rapid [employer] ID Now   The specimen is NEGATIVE for SARS-CoV2, the novel coronavirus associated with COVID-19. A negative result does not rule out COVID-19. This test has been authorized by the FDA under an Emergency Use Authorization (EUA) for use by authorized laboratories. Fact sheet for Healthcare Providers:  http://www.naif.juaquin/ Fact sheet for Patients: http://www.naif.juaquin/   Methodology: Isothermal Nucleic Acid Amplification       INFLUENZA A & B AG (RAPID TEST) [[id number]] Collected: 02/13/23 1653    Order Status: Canceled Specimen: Nasopharyngeal from Nasal washing     INFLUENZA A & B AG (RAPID TEST) [[id number]] Collected: 02/13/23 1653    Order Status: Completed Specimen: Nasal washing Updated: 02/13/23 1748     Influenza A Antigen Negative        Influenza B Antigen Negative                Labs:     Recent Labs     02/16/23  1033 02/15/23  0726   WBC 3.7 2.0*   HGB 10.3* 10.0*   HCT 31.5* 31.2*    158       Recent Labs     02/16/23  1033 02/15/23  0726 02/14/23  0905    137 133*   K 2.9* 3.6 3.4*    106 100   CO2 30 26 29   BUN 11 9 9   CREA 0.69 0.68 0.93   * 286* 211*   CA 9.0 8.8 8.6       Recent Labs     02/16/23 1033 02/15/23  0726 02/14/23  0905   * 86* 51   AP 50 50 59   TBILI 0.2 0.2 0.4   TP 7.1 7.0 7.7   ALB 2.7* 2.4* 2.8*   GLOB 4.4* 4.6* 4.9*       No results for input(s): INR, PTP, APTT, INREXT, INREXT in the last 72 hours. No results for input(s): FE, TIBC, PSAT, FERR in the last 72 hours. No results found for: FOL, RBCF   No results for input(s): PH, PCO2, PO2 in the last 72 hours. No results for input(s): CPK, CKNDX, TROIQ in the last 72 hours.     No lab exists for component: CPKMB  No results found for: CHOL, CHOLX, CHLST, CHOLV, HDL, HDLP, LDL, LDLC, DLDLP, TGLX, TRIGL, TRIGP, CHHD, CHHDX  Lab Results   Component Value Date/Time    Glucose (POC) 185 (H) 02/16/2023 08:18 PM    Glucose (POC) 134 (H) 02/16/2023 05:31 PM    Glucose (POC) 244 (H) 02/16/2023 11:17 AM    Glucose (POC) 261 (H) 02/16/2023 07:44 AM    Glucose (POC) 173 (H) 02/15/2023 08:43 PM     Lab Results   Component Value Date/Time    Color Yellow/Straw 02/13/2023 11:19 PM    Appearance Clear 02/13/2023 11:19 PM Specific gravity 1.022 02/13/2023 11:19 PM    pH (UA) 5.0 02/13/2023 11:19 PM    Protein Negative 02/13/2023 11:19 PM    Glucose >1,000 (A) 02/13/2023 11:19 PM    Ketone Negative 02/13/2023 11:19 PM    Bilirubin Negative 02/13/2023 11:19 PM    Urobilinogen 0.1 (L) 02/13/2023 11:19 PM    Nitrites Negative 02/13/2023 11:19 PM    Leukocyte Esterase Negative 02/13/2023 11:19 PM         Assessment:     Gram negative PNA  Levaquin 750 mg every 24 hours  3.375 g IV Zosyn every 8 hours   Mucinex 200 mg every 4 hours    Acute hypoxic respiratory failure on 2L nasal cannula O2 sat 94%  Duo neb 3 mL every 6 hours  Symbicort 2 puffs BID    History of lung cancer  Pt has small cell lung cancer   Was started on carboplatin, etoposide and atezolizumab on 2/6/23.    Followed by pulmonology and hematology    Type 2 diabetes  Humalog injection 4 times daily  Glucose 165 today    Hypertension   25 mg HCTZ  10 mg amlodipine    Hypokalemia   Replete potassium 40 meQ    Diarrhea (resolved)    Hyponatremia(resolved)      Plan:     Lovenox for DVT prophylaxis     Repeat labs    possible discharge in next 24 hours        Current Facility-Administered Medications:     albuterol-ipratropium (DUO-NEB) 2.5 MG-0.5 MG/3 ML, 3 mL, Nebulization, Q6HWA RT, Javier Palomares MD    sodium chloride (NS) flush 5-10 mL, 5-10 mL, IntraVENous, PRN, Suzi Benavidez MD    levoFLOXacin (LEVAQUIN) 750 mg in D5W IVPB, 750 mg, IntraVENous, Q24H, Suzi Benavidez MD, Last Rate: 100 mL/hr at 02/16/23 1800, 750 mg at 02/16/23 1800    insulin lispro (HUMALOG) injection, , SubCUTAneous, AC&HS, Suzi Benavidez MD, 4 Units at 02/16/23 1130    glucose chewable tablet 16 g, 4 Tablet, Oral, PRN, Suzi Benavidez MD    glucagon (GLUCAGEN) injection 1 mg, 1 mg, IntraMUSCular, PRN, Suzi Benavidez MD    acetaminophen (TYLENOL) tablet 650 mg, 650 mg, Oral, Q6H PRN, 650 mg at 02/14/23 1510 **OR** acetaminophen (TYLENOL) suppository 650 mg, 650 mg, Rectal, Q6H PRN, Lanre Kessler MD    polyethylene glycol (MIRALAX) packet 17 g, 17 g, Oral, DAILY PRN, Omar Benavidez MD    ondansetron (ZOFRAN ODT) tablet 4 mg, 4 mg, Oral, Q8H PRN **OR** ondansetron (ZOFRAN) injection 4 mg, 4 mg, IntraVENous, Q6H PRN, Suzi Benavidez MD    enoxaparin (LOVENOX) injection 40 mg, 40 mg, SubCUTAneous, DAILY, Suzi Benavidez MD, 40 mg at 02/16/23 4240    budesonide-formoteroL (SYMBICORT) 160-4.5 mcg/actuation HFA inhaler 2 Puff, 2 Puff, Inhalation, BID, Suzi Benavidez MD, 2 Puff at 02/16/23 0840    piperacillin-tazobactam (ZOSYN) 3.375 g in 0.9% sodium chloride (MBP/ADV) 100 mL MBP, 3.375 g, IntraVENous, Q8H, Suzi Benavidez MD, Last Rate: 25 mL/hr at 02/17/23 0419, 3.375 g at 02/17/23 0419

## 2023-02-17 NOTE — PROGRESS NOTES
Hematology/Oncology   Progress Note    Patient: Poly Walker MRN: [id number]     YOB: 1960  Age: 60 y.o. Sex: female      Admit Date: 2/13/2023    LOS: 3 days     Chief Complaint: Admitted with worsening shortness of breath    Subjective:     Feels much better today. Constitutional No fevers, chills, night sweats, excessive fatigue or weight loss. Allergic/Immunologic No recent allergic reactions   Eyes No significant visual difficulties. No diplopia. ENMT No problems with hearing, no sore throat, no sinus drainage. Endocrine No hot flashes or night sweats. No cold intolerance, polyuria, or polydipsia   Hematologic/Lymphatic No easy bruising or bleeding. The patient denies any tender or palpable lymph nodes   Breasts No abnormal masses of breast, nipple discharge or pain. Respiratory No dyspnea on exertion, orthopnea, chest pain, cough or hemoptysis. Cardiovascular No anginal chest pain, irregular heart beat, tachycardia, palpitations or orthopnea. Gastrointestinal No nausea, vomiting, diarrhea, constipation, cramping, dysphagia, reflux, heartburn, GI bleeding, or early satiety. No change in bowel habits. Genitourinary (M) No hematuria, dysuria, increased frequency, urgency, hesitancy or incontinence. Musculoskeletal No joint pain, swelling or redness. No decreased range of motion. Integumentary No chronic rashes, inflammation, ulcerations, pruritus, petechiae, purpura, ecchymoses, or skin changes. Neurologic No headache, blurred vision, and no areas of focal weakness or numbness. Normal gait. No sensory problems. Psychiatric No insomnia, depression, victoria or mood swings. No psychotropic drugs.         Current Facility-Administered Medications:     sodium chloride (NS) flush 5-10 mL, 5-10 mL, IntraVENous, PRN, Suzi Benavidez MD    levoFLOXacin (LEVAQUIN) 750 mg in D5W IVPB, 750 mg, IntraVENous, Q24H, Suzi Benavidez MD, Last Rate: 100 mL/hr at 02/16/23 1800, 750 mg at 02/16/23 1800    insulin lispro (HUMALOG) injection, , SubCUTAneous, AC&HS, Suzi Benavidez MD, 4 Units at 02/16/23 1130    glucose chewable tablet 16 g, 4 Tablet, Oral, PRN, Ritesh Benavidez MD    glucagon (GLUCAGEN) injection 1 mg, 1 mg, IntraMUSCular, PRN, Ritesh Benavidez MD    acetaminophen (TYLENOL) tablet 650 mg, 650 mg, Oral, Q6H PRN, 650 mg at 02/14/23 2353 **OR** acetaminophen (TYLENOL) suppository 650 mg, 650 mg, Rectal, Q6H PRN, Suzi Benavidez MD    polyethylene glycol (MIRALAX) packet 17 g, 17 g, Oral, DAILY PRN, Suzi Benavidez MD    ondansetron (ZOFRAN ODT) tablet 4 mg, 4 mg, Oral, Q8H PRN **OR** ondansetron (ZOFRAN) injection 4 mg, 4 mg, IntraVENous, Q6H PRN, Suzi Benavidez MD    enoxaparin (LOVENOX) injection 40 mg, 40 mg, SubCUTAneous, DAILY, Suzi Benavidez MD, 40 mg at 02/16/23 4567    albuterol-ipratropium (DUO-NEB) 2.5 MG-0.5 MG/3 ML, 3 mL, Nebulization, Q6H RT, Suzi Benavidez MD, 3 mL at 02/16/23 1926    budesonide-formoteroL (SYMBICORT) 160-4.5 mcg/actuation HFA inhaler 2 Puff, 2 Puff, Inhalation, BID, Suzi Benavidez MD, 2 Puff at 02/16/23 0840    piperacillin-tazobactam (ZOSYN) 3.375 g in 0.9% sodium chloride (MBP/ADV) 100 mL MBP, 3.375 g, IntraVENous, Q8H, Suzi Benavidez MD, Last Rate: 25 mL/hr at 02/16/23 2102, 3.375 g at 02/16/23 2102     Objective:     Vitals:    02/16/23 1349 02/16/23 1500 02/16/23 1927 02/16/23 2009   BP:  (!) 152/71  138/78   Pulse:  65  69   Resp:  18  18   Temp:  98.2 °F (36.8 °C)  98.9 °F (37.2 °C)   SpO2: 96% 98% 95% 95%   Weight:       Height:              Physical Exam:   Constitutional Alert, cooperative, oriented. Mood and affect appropriate. Appears close to chronological age. Well nourished. Well developed. Head Normocephalic; no scars   Eyes Conjunctivae and sclerae are clear and without icterus. Pupils are reactive and equal.   ENMT Sinuses are nontender. No oral exudates, ulcers, masses, thrush or mucositis.  Oropharynx clear.  Tongue normal.   Neck Supple without masses or thyromegaly. No jugular venous distension. Hematologic/Lymphatic No petechiae or purpura. No tender or palpable lymph nodes in the cervical, supraclavicular, axillary or inguinal area. Respiratory Lungs are clear to auscultation without rhonchi or wheezing. Cardiovascular Regular rate and rhythm of heart without murmurs, gallops or rubs. Chest / Line Site Chest is symmetric with no chest wall deformities. Abdomen Non-tender, non-distended, no masses, ascites or hepatosplenomegaly. Good bowel sounds. No guarding or rebound tenderness. No pulsatile masses. Musculoskeletal No tenderness or swelling, normal range of motion without obvious weakness. Extremities No visible deformities, no cyanosis, clubbing or edema. Skin No rashes, scars, or lesions suggestive of malignancy. No petechiae, purpura, or ecchymoses. No excoriations. Neurologic No sensory or motor deficits, normal cerebellar function, normal gait, cranial nerves intact. Psychiatric Alert and oriented times three. Coherent speech. Verbalizes understanding of our discussions today.        Lab/Data Review:  Recent Labs     02/16/23  1033 02/15/23  0726   WBC 3.7 2.0*   HGB 10.3* 10.0*   HCT 31.5* 31.2*    158     Recent Labs     02/16/23  1033 02/15/23  0726 02/14/23  0905    137 133*   K 2.9* 3.6 3.4*    106 100   CO2 30 26 29   * 286* 211*   BUN 11 9 9   CREA 0.69 0.68 0.93   CA 9.0 8.8 8.6   ALB 2.7* 2.4* 2.8*   TBILI 0.2 0.2 0.4   * 86* 51     Recent Labs     02/13/23 2238   PH 7.43   PCO2 46*   PO2 54*   HCO3 30*     Recent Results (from the past 24 hour(s))   GLUCOSE, POC    Collection Time: 02/16/23  7:44 AM   Result Value Ref Range    Glucose (POC) 261 (H) 65 - 100 mg/dL    Performed by MERARI DISLA    METABOLIC PANEL, COMPREHENSIVE    Collection Time: 02/16/23 10:33 AM   Result Value Ref Range    Sodium 140 136 - 145 mmol/L    Potassium 2.9 (L) 3.5 - 5.1 mmol/L    Chloride 106 97 - 108 mmol/L    CO2 30 21 - 32 mmol/L    Anion gap 4 (L) 5 - 15 mmol/L    Glucose 163 (H) 65 - 100 mg/dL    BUN 11 6 - 20 mg/dL    Creatinine 0.69 0.55 - 1.02 mg/dL    BUN/Creatinine ratio 16 12 - 20      eGFR >60 >60 ml/min/1.73m2    Calcium 9.0 8.5 - 10.1 mg/dL    Bilirubin, total 0.2 0.2 - 1.0 mg/dL    AST (SGOT) 76 (H) 15 - 37 U/L    ALT (SGPT) 107 (H) 12 - 78 U/L    Alk. phosphatase 50 45 - 117 U/L    Protein, total 7.1 6.4 - 8.2 g/dL    Albumin 2.7 (L) 3.5 - 5.0 g/dL    Globulin 4.4 (H) 2.0 - 4.0 g/dL    A-G Ratio 0.6 (L) 1.1 - 2.2     CBC WITH AUTOMATED DIFF    Collection Time: 02/16/23 10:33 AM   Result Value Ref Range    WBC 3.7 3.6 - 11.0 K/uL    RBC 3.55 (L) 3.80 - 5.20 M/uL    HGB 10.3 (L) 11.5 - 16.0 g/dL    HCT 31.5 (L) 35.0 - 47.0 %    MCV 88.7 80.0 - 99.0 FL    MCH 29.0 26.0 - 34.0 PG    MCHC 32.7 30.0 - 36.5 g/dL    RDW 13.7 11.5 - 14.5 %    PLATELET 896 799 - 901 K/uL    MPV 10.1 8.9 - 12.9 FL    NRBC 0.0 0.0  WBC    ABSOLUTE NRBC 0.00 0.00 - 0.01 K/uL    NEUTROPHILS 43 32 - 75 %    BAND NEUTROPHILS 2 0 - 6 %    LYMPHOCYTES 54 (H) 12 - 49 %    MONOCYTES 1 (L) 5 - 13 %    EOSINOPHILS 0 0 - 7 %    BASOPHILS 0 0 - 1 %    IMMATURE GRANULOCYTES 0 %    ABS. NEUTROPHILS 1.7 (L) 1.8 - 8.0 K/UL    ABS. LYMPHOCYTES 2.0 0.8 - 3.5 K/UL    ABS. MONOCYTES 0.0 0.0 - 1.0 K/UL    ABS. EOSINOPHILS 0.0 0.0 - 0.4 K/UL    ABS. BASOPHILS 0.0 0.0 - 0.1 K/UL    ABS. IMM.  GRANS. 0.0 K/UL    DF Smear Scanned      PLATELET COMMENTS Large Platelets      RBC COMMENTS Normocytic, Normochromic      WBC COMMENTS Reactive Lymphs      Pathologist review SENT FOR PATHOLOGIST REVIEW    GLUCOSE, POC    Collection Time: 02/16/23 11:17 AM   Result Value Ref Range    Glucose (POC) 244 (H) 65 - 100 mg/dL    Performed by Juan Diego 30, POC    Collection Time: 02/16/23  5:31 PM   Result Value Ref Range    Glucose (POC) 134 (H) 65 - 100 mg/dL    Performed by [address] [place] [address], [facility] Collection Time: 02/16/23  8:18 PM   Result Value Ref Range    Glucose (POC) 185 (H) 65 - 100 mg/dL    Performed by Kaden Booth         Radiology:   CT Results  (Last 48 hours)      None             Assessment and Plan: Active Problems:    Respiratory failure with hypoxia ([address] (2/13/2023)      Pneumonia (2/13/2023)  Extensive stage small cell lung cancer:  -Started carboplatin, etoposide, atezolizumab on 2/6/2023  -White count is improved to 3.7 today.  -Follows with Dr. Amita Lind    Brain metastasis:  -Status post gamma knife treatment. Dyspnea:  -Secondary to COPD and possible postobstructive pneumonia  -On steroids and antibiotics. DVT prophylaxis:  -On Lovenox. We will sign off. Feel free to call us with any questions or concerns.       Signed By: Andrei Zamudio MD     February 16, 2023

## 2023-02-19 LAB
BACTERIA SPEC CULT: NORMAL
BACTERIA SPEC CULT: NORMAL
SPECIAL REQUESTS,SREQ: NORMAL
SPECIAL REQUESTS,SREQ: NORMAL

## 2023-03-30 ENCOUNTER — HOSPITAL ENCOUNTER (OUTPATIENT)
Dept: RADIATION THERAPY | Age: 63
End: 2023-03-30

## 2023-04-12 ENCOUNTER — HOSPITAL ENCOUNTER (OUTPATIENT)
Dept: RADIATION THERAPY | Age: 63
Discharge: HOME | End: 2023-04-12
Payer: MEDICAID

## 2023-04-12 PROCEDURE — 77300 RADIATION THERAPY DOSE PLAN: CPT

## 2023-04-12 PROCEDURE — 77301 RADIOTHERAPY DOSE PLAN IMRT: CPT

## 2023-04-12 PROCEDURE — 77338 DESIGN MLC DEVICE FOR IMRT: CPT

## 2023-04-12 PROCEDURE — 77470 SPECIAL RADIATION TREATMENT: CPT

## 2023-04-18 ENCOUNTER — HOSPITAL ENCOUNTER (OUTPATIENT)
Dept: RADIATION THERAPY | Age: 63
Discharge: HOME | End: 2023-04-18
Payer: MEDICAID

## 2023-04-18 PROCEDURE — 77386 HC IMRT TRMT DLVR COMPL: CPT

## 2023-04-19 ENCOUNTER — HOSPITAL ENCOUNTER (OUTPATIENT)
Dept: RADIATION THERAPY | Age: 63
Discharge: HOME | End: 2023-04-19
Payer: MEDICAID

## 2023-04-19 PROCEDURE — 77386 HC IMRT TRMT DLVR COMPL: CPT

## 2023-04-20 ENCOUNTER — HOSPITAL ENCOUNTER (OUTPATIENT)
Dept: RADIATION THERAPY | Age: 63
Discharge: HOME | End: 2023-04-20
Payer: MEDICAID

## 2023-04-20 PROCEDURE — 77386 HC IMRT TRMT DLVR COMPL: CPT

## 2023-04-21 ENCOUNTER — HOSPITAL ENCOUNTER (OUTPATIENT)
Dept: RADIATION THERAPY | Age: 63
Discharge: HOME | End: 2023-04-21
Payer: MEDICAID

## 2023-04-21 PROCEDURE — 77386 HC IMRT TRMT DLVR COMPL: CPT

## 2023-04-24 ENCOUNTER — HOSPITAL ENCOUNTER (OUTPATIENT)
Dept: RADIATION THERAPY | Age: 63
Discharge: HOME | End: 2023-04-24
Payer: MEDICAID

## 2023-04-24 PROCEDURE — 77386 HC IMRT TRMT DLVR COMPL: CPT

## 2023-04-24 PROCEDURE — 77336 RADIATION PHYSICS CONSULT: CPT

## 2023-04-25 ENCOUNTER — HOSPITAL ENCOUNTER (OUTPATIENT)
Dept: RADIATION THERAPY | Age: 63
Discharge: HOME | End: 2023-04-25
Payer: MEDICAID

## 2023-04-25 PROCEDURE — 77386 HC IMRT TRMT DLVR COMPL: CPT

## 2023-04-26 ENCOUNTER — HOSPITAL ENCOUNTER (OUTPATIENT)
Dept: RADIATION THERAPY | Age: 63
Discharge: HOME | End: 2023-04-26
Payer: MEDICAID

## 2023-04-26 PROCEDURE — 77386 HC IMRT TRMT DLVR COMPL: CPT

## 2023-04-27 ENCOUNTER — HOSPITAL ENCOUNTER (OUTPATIENT)
Dept: RADIATION THERAPY | Age: 63
Discharge: HOME | End: 2023-04-27
Payer: MEDICAID

## 2023-04-27 PROCEDURE — 77386 HC IMRT TRMT DLVR COMPL: CPT

## 2023-04-28 ENCOUNTER — HOSPITAL ENCOUNTER (OUTPATIENT)
Dept: RADIATION THERAPY | Age: 63
Discharge: HOME | End: 2023-04-28
Payer: MEDICAID

## 2023-04-28 PROCEDURE — 77386 HC IMRT TRMT DLVR COMPL: CPT

## 2023-05-01 ENCOUNTER — HOSPITAL ENCOUNTER (OUTPATIENT)
Dept: RADIATION THERAPY | Age: 63
Discharge: HOME | End: 2023-05-01
Payer: MEDICAID

## 2023-05-01 PROCEDURE — 77386 HC IMRT TRMT DLVR COMPL: CPT

## 2023-05-01 PROCEDURE — 77336 RADIATION PHYSICS CONSULT: CPT

## 2023-05-02 ENCOUNTER — HOSPITAL ENCOUNTER (OUTPATIENT)
Dept: RADIATION THERAPY | Age: 63
Discharge: HOME | End: 2023-05-02
Payer: MEDICAID

## 2023-05-02 PROCEDURE — 77386 HC IMRT TRMT DLVR COMPL: CPT

## 2023-05-03 ENCOUNTER — HOSPITAL ENCOUNTER (OUTPATIENT)
Dept: RADIATION THERAPY | Age: 63
Discharge: HOME | End: 2023-05-03
Payer: MEDICAID

## 2023-05-03 PROCEDURE — 77386 HC IMRT TRMT DLVR COMPL: CPT

## 2023-05-04 ENCOUNTER — HOSPITAL ENCOUNTER (OUTPATIENT)
Dept: RADIATION THERAPY | Age: 63
Discharge: HOME | End: 2023-05-04
Payer: MEDICAID

## 2023-05-04 PROCEDURE — 77386 HC IMRT TRMT DLVR COMPL: CPT

## 2023-05-05 ENCOUNTER — HOSPITAL ENCOUNTER (OUTPATIENT)
Dept: RADIATION THERAPY | Age: 63
Discharge: HOME | End: 2023-05-05
Payer: MEDICAID

## 2023-05-05 PROCEDURE — 77386 HC IMRT TRMT DLVR COMPL: CPT

## 2023-05-08 ENCOUNTER — HOSPITAL ENCOUNTER (OUTPATIENT)
Facility: HOSPITAL | Age: 63
Discharge: HOME | End: 2023-05-11
Attending: RADIOLOGY
Payer: MEDICAID

## 2023-05-08 PROCEDURE — 77386 HC NTSTY MODUL RAD TX DLVR CPLX: CPT

## 2023-05-08 PROCEDURE — 77336 RADIATION PHYSICS CONSULT: CPT

## 2023-05-09 ENCOUNTER — HOSPITAL ENCOUNTER (OUTPATIENT)
Facility: HOSPITAL | Age: 63
Discharge: HOME | End: 2023-05-12
Attending: RADIOLOGY
Payer: MEDICAID

## 2023-05-09 PROCEDURE — 77386 HC NTSTY MODUL RAD TX DLVR CPLX: CPT

## 2023-05-10 ENCOUNTER — HOSPITAL ENCOUNTER (OUTPATIENT)
Facility: HOSPITAL | Age: 63
Discharge: HOME | End: 2023-05-13
Attending: RADIOLOGY
Payer: MEDICAID

## 2023-05-10 PROCEDURE — 77386 HC NTSTY MODUL RAD TX DLVR CPLX: CPT

## 2023-05-11 ENCOUNTER — APPOINTMENT (OUTPATIENT)
Dept: RADIATION THERAPY | Age: 63
End: 2023-05-11
Payer: MEDICAID

## 2023-05-11 ENCOUNTER — HOSPITAL ENCOUNTER (OUTPATIENT)
Facility: HOSPITAL | Age: 63
Discharge: HOME | End: 2023-05-14
Attending: RADIOLOGY
Payer: MEDICAID

## 2023-05-11 PROCEDURE — 77386 HC NTSTY MODUL RAD TX DLVR CPLX: CPT

## 2023-05-12 ENCOUNTER — APPOINTMENT (OUTPATIENT)
Dept: RADIATION THERAPY | Age: 63
End: 2023-05-12
Payer: MEDICAID

## 2023-05-12 ENCOUNTER — HOSPITAL ENCOUNTER (OUTPATIENT)
Facility: HOSPITAL | Age: 63
Discharge: HOME | End: 2023-05-15
Attending: RADIOLOGY
Payer: MEDICAID

## 2023-05-12 PROCEDURE — 77386 HC NTSTY MODUL RAD TX DLVR CPLX: CPT

## 2023-05-15 ENCOUNTER — HOSPITAL ENCOUNTER (OUTPATIENT)
Facility: HOSPITAL | Age: 63
Discharge: HOME | End: 2023-05-18
Attending: RADIOLOGY
Payer: MEDICAID

## 2023-05-15 ENCOUNTER — APPOINTMENT (OUTPATIENT)
Dept: RADIATION THERAPY | Age: 63
End: 2023-05-15
Payer: MEDICAID

## 2023-05-15 PROCEDURE — 77386 HC NTSTY MODUL RAD TX DLVR CPLX: CPT

## 2023-05-15 PROCEDURE — 77336 RADIATION PHYSICS CONSULT: CPT

## 2023-05-16 ENCOUNTER — HOSPITAL ENCOUNTER (OUTPATIENT)
Facility: HOSPITAL | Age: 63
Discharge: HOME | End: 2023-05-19
Attending: RADIOLOGY
Payer: MEDICAID

## 2023-05-16 ENCOUNTER — APPOINTMENT (OUTPATIENT)
Dept: RADIATION THERAPY | Age: 63
End: 2023-05-16
Payer: MEDICAID

## 2023-05-16 PROCEDURE — 77386 HC NTSTY MODUL RAD TX DLVR CPLX: CPT

## 2023-05-17 ENCOUNTER — HOSPITAL ENCOUNTER (OUTPATIENT)
Facility: HOSPITAL | Age: 63
Discharge: HOME | End: 2023-05-20
Attending: RADIOLOGY
Payer: MEDICAID

## 2023-05-17 ENCOUNTER — APPOINTMENT (OUTPATIENT)
Dept: RADIATION THERAPY | Age: 63
End: 2023-05-17
Payer: MEDICAID

## 2023-05-17 PROCEDURE — 77386 HC NTSTY MODUL RAD TX DLVR CPLX: CPT

## 2023-05-18 ENCOUNTER — APPOINTMENT (OUTPATIENT)
Dept: RADIATION THERAPY | Age: 63
End: 2023-05-18
Payer: MEDICAID

## 2023-05-18 ENCOUNTER — HOSPITAL ENCOUNTER (OUTPATIENT)
Facility: HOSPITAL | Age: 63
Discharge: HOME | End: 2023-05-21
Attending: RADIOLOGY
Payer: MEDICAID

## 2023-05-18 PROCEDURE — 77386 HC NTSTY MODUL RAD TX DLVR CPLX: CPT

## 2023-05-19 ENCOUNTER — APPOINTMENT (OUTPATIENT)
Dept: RADIATION THERAPY | Age: 63
End: 2023-05-19
Payer: MEDICAID

## 2023-05-19 ENCOUNTER — HOSPITAL ENCOUNTER (OUTPATIENT)
Facility: HOSPITAL | Age: 63
Discharge: HOME | End: 2023-05-22
Attending: RADIOLOGY
Payer: MEDICAID

## 2023-05-19 LAB
RAD ONC ARIA COURSE FIRST TREATMENT DATE: NORMAL
RAD ONC ARIA COURSE ID: NORMAL
RAD ONC ARIA COURSE INTENT: NORMAL
RAD ONC ARIA COURSE LAST TREATMENT DATE: NORMAL
RAD ONC ARIA COURSE SESSION NUMBER: 2
RAD ONC ARIA COURSE START DATE: NORMAL
RAD ONC ARIA COURSE TREATMENT ELAPSED DAYS: 3
RAD ONC ARIA PLAN FRACTIONS TREATED TO DATE: 2
RAD ONC ARIA PLAN ID: NORMAL
RAD ONC ARIA PLAN PRESCRIBED DOSE PER FRACTION: 2 GY
RAD ONC ARIA PLAN PRIMARY REFERENCE POINT: NORMAL
RAD ONC ARIA PLAN TOTAL FRACTIONS PRESCRIBED: 3
RAD ONC ARIA PLAN TOTAL PRESCRIBED DOSE: 6 CGY
RAD ONC ARIA REFERENCE POINT DOSAGE GIVEN TO DATE: 3 GY
RAD ONC ARIA REFERENCE POINT ID: NORMAL
RAD ONC ARIA REFERENCE POINT SESSION DOSAGE GIVEN: 1 GY

## 2023-05-19 PROCEDURE — 77385 HC NTSTY MODUL RAD TX DLVR SMPL: CPT

## 2023-05-22 ENCOUNTER — APPOINTMENT (OUTPATIENT)
Dept: RADIATION THERAPY | Age: 63
End: 2023-05-22
Payer: MEDICAID

## 2023-05-22 ENCOUNTER — HOSPITAL ENCOUNTER (OUTPATIENT)
Facility: HOSPITAL | Age: 63
Discharge: HOME | End: 2023-05-25
Attending: RADIOLOGY
Payer: MEDICAID

## 2023-05-22 PROCEDURE — 77336 RADIATION PHYSICS CONSULT: CPT

## 2023-05-22 PROCEDURE — 77385 HC NTSTY MODUL RAD TX DLVR SMPL: CPT

## 2023-05-23 ENCOUNTER — HOSPITAL ENCOUNTER (OUTPATIENT)
Facility: HOSPITAL | Age: 63
Discharge: HOME | End: 2023-05-26
Attending: RADIOLOGY
Payer: MEDICAID

## 2023-05-23 ENCOUNTER — APPOINTMENT (OUTPATIENT)
Dept: RADIATION THERAPY | Age: 63
End: 2023-05-23
Payer: MEDICAID

## 2023-05-23 PROCEDURE — 77385 HC NTSTY MODUL RAD TX DLVR SMPL: CPT

## 2023-05-24 ENCOUNTER — HOSPITAL ENCOUNTER (OUTPATIENT)
Facility: HOSPITAL | Age: 63
Discharge: HOME | End: 2023-05-27
Attending: RADIOLOGY
Payer: MEDICAID

## 2023-05-24 ENCOUNTER — APPOINTMENT (OUTPATIENT)
Dept: RADIATION THERAPY | Age: 63
End: 2023-05-24
Payer: MEDICAID

## 2023-05-24 PROCEDURE — 77385 HC NTSTY MODUL RAD TX DLVR SMPL: CPT

## 2023-05-25 ENCOUNTER — APPOINTMENT (OUTPATIENT)
Dept: RADIATION THERAPY | Age: 63
End: 2023-05-25
Payer: MEDICAID

## 2023-05-25 ENCOUNTER — HOSPITAL ENCOUNTER (OUTPATIENT)
Facility: HOSPITAL | Age: 63
Discharge: HOME | End: 2023-05-28
Attending: RADIOLOGY
Payer: MEDICAID

## 2023-05-25 PROCEDURE — 77385 HC NTSTY MODUL RAD TX DLVR SMPL: CPT

## 2023-05-26 ENCOUNTER — APPOINTMENT (OUTPATIENT)
Dept: RADIATION THERAPY | Age: 63
End: 2023-05-26
Payer: MEDICAID

## 2023-05-26 ENCOUNTER — HOSPITAL ENCOUNTER (OUTPATIENT)
Facility: HOSPITAL | Age: 63
Discharge: HOME | End: 2023-05-29
Attending: RADIOLOGY
Payer: MEDICAID

## 2023-05-26 LAB
RAD ONC ARIA COURSE FIRST TREATMENT DATE: NORMAL
RAD ONC ARIA COURSE ID: NORMAL
RAD ONC ARIA COURSE INTENT: NORMAL
RAD ONC ARIA COURSE LAST TREATMENT DATE: NORMAL
RAD ONC ARIA COURSE SESSION NUMBER: 29
RAD ONC ARIA COURSE START DATE: NORMAL
RAD ONC ARIA COURSE TREATMENT ELAPSED DAYS: 38
RAD ONC ARIA PLAN FRACTIONS TREATED TO DATE: 29
RAD ONC ARIA PLAN ID: NORMAL
RAD ONC ARIA PLAN PRESCRIBED DOSE PER FRACTION: 2 GY
RAD ONC ARIA PLAN PRIMARY REFERENCE POINT: NORMAL
RAD ONC ARIA PLAN TOTAL FRACTIONS PRESCRIBED: 30
RAD ONC ARIA PLAN TOTAL PRESCRIBED DOSE: 6000 CGY
RAD ONC ARIA REFERENCE POINT DOSAGE GIVEN TO DATE: 36.77 GY
RAD ONC ARIA REFERENCE POINT DOSAGE GIVEN TO DATE: 58 GY
RAD ONC ARIA REFERENCE POINT ID: NORMAL
RAD ONC ARIA REFERENCE POINT ID: NORMAL
RAD ONC ARIA REFERENCE POINT SESSION DOSAGE GIVEN: 1.27 GY
RAD ONC ARIA REFERENCE POINT SESSION DOSAGE GIVEN: 2 GY

## 2023-05-26 PROCEDURE — 77385 HC NTSTY MODUL RAD TX DLVR SMPL: CPT

## 2023-05-30 ENCOUNTER — APPOINTMENT (OUTPATIENT)
Facility: HOSPITAL | Age: 63
End: 2023-05-30
Attending: RADIOLOGY
Payer: MEDICAID

## 2023-05-30 ENCOUNTER — APPOINTMENT (OUTPATIENT)
Dept: RADIATION THERAPY | Age: 63
End: 2023-05-30
Payer: MEDICAID

## 2023-05-31 ENCOUNTER — APPOINTMENT (OUTPATIENT)
Facility: HOSPITAL | Age: 63
End: 2023-05-31
Attending: RADIOLOGY
Payer: MEDICAID

## 2023-06-01 ENCOUNTER — HOSPITAL ENCOUNTER (OUTPATIENT)
Facility: HOSPITAL | Age: 63
Discharge: HOME | End: 2023-06-04
Attending: RADIOLOGY
Payer: MEDICAID

## 2023-06-01 LAB
RAD ONC ARIA COURSE FIRST TREATMENT DATE: NORMAL
RAD ONC ARIA COURSE ID: NORMAL
RAD ONC ARIA COURSE INTENT: NORMAL
RAD ONC ARIA COURSE LAST TREATMENT DATE: NORMAL
RAD ONC ARIA COURSE SESSION NUMBER: 30
RAD ONC ARIA COURSE START DATE: NORMAL
RAD ONC ARIA COURSE TREATMENT ELAPSED DAYS: 44
RAD ONC ARIA PLAN FRACTIONS TREATED TO DATE: 30
RAD ONC ARIA PLAN ID: NORMAL
RAD ONC ARIA PLAN PRESCRIBED DOSE PER FRACTION: 2 GY
RAD ONC ARIA PLAN PRIMARY REFERENCE POINT: NORMAL
RAD ONC ARIA PLAN TOTAL FRACTIONS PRESCRIBED: 30
RAD ONC ARIA PLAN TOTAL PRESCRIBED DOSE: 6000 CGY
RAD ONC ARIA REFERENCE POINT DOSAGE GIVEN TO DATE: 38.04 GY
RAD ONC ARIA REFERENCE POINT DOSAGE GIVEN TO DATE: 60 GY
RAD ONC ARIA REFERENCE POINT ID: NORMAL
RAD ONC ARIA REFERENCE POINT ID: NORMAL
RAD ONC ARIA REFERENCE POINT SESSION DOSAGE GIVEN: 1.27 GY
RAD ONC ARIA REFERENCE POINT SESSION DOSAGE GIVEN: 2 GY

## 2023-06-01 PROCEDURE — 77386 HC NTSTY MODUL RAD TX DLVR CPLX: CPT

## 2023-06-01 PROCEDURE — 77336 RADIATION PHYSICS CONSULT: CPT

## 2023-06-27 ENCOUNTER — HOSPITAL ENCOUNTER (OUTPATIENT)
Facility: HOSPITAL | Age: 63
Discharge: HOME | End: 2023-06-30
Payer: MEDICAID

## 2023-10-31 ENCOUNTER — HOSPITAL ENCOUNTER (EMERGENCY)
Facility: HOSPITAL | Age: 63
Discharge: HOME | End: 2023-10-31
Attending: STUDENT IN AN ORGANIZED HEALTH CARE EDUCATION/TRAINING PROGRAM
Payer: MEDICAID

## 2023-10-31 ENCOUNTER — APPOINTMENT (OUTPATIENT)
Facility: HOSPITAL | Age: 63
End: 2023-10-31
Payer: MEDICAID

## 2023-10-31 VITALS
HEIGHT: 66 IN | BODY MASS INDEX: 33.43 KG/M2 | RESPIRATION RATE: 16 BRPM | DIASTOLIC BLOOD PRESSURE: 76 MMHG | HEART RATE: 80 BPM | OXYGEN SATURATION: 95 % | TEMPERATURE: 98 F | WEIGHT: 208 LBS | SYSTOLIC BLOOD PRESSURE: 120 MMHG

## 2023-10-31 DIAGNOSIS — J44.1 CHRONIC OBSTRUCTIVE PULMONARY DISEASE WITH ACUTE EXACERBATION (HCC): Primary | ICD-10-CM

## 2023-10-31 LAB
ALBUMIN SERPL-MCNC: 3.1 G/DL (ref 3.5–5)
ALBUMIN/GLOB SERPL: 0.7 (ref 1.1–2.2)
ALP SERPL-CCNC: 68 U/L (ref 45–117)
ALT SERPL-CCNC: 36 U/L (ref 12–78)
ANION GAP SERPL CALC-SCNC: 6 MMOL/L (ref 5–15)
AST SERPL W P-5'-P-CCNC: 19 U/L (ref 15–37)
BASOPHILS # BLD: 0 K/UL (ref 0–0.1)
BASOPHILS NFR BLD: 0 % (ref 0–1)
BILIRUB SERPL-MCNC: 0.2 MG/DL (ref 0.2–1)
BNP SERPL-MCNC: 199 PG/ML
BUN SERPL-MCNC: 17 MG/DL (ref 6–20)
BUN/CREAT SERPL: 17 (ref 12–20)
CA-I BLD-MCNC: 9 MG/DL (ref 8.5–10.1)
CHLORIDE SERPL-SCNC: 106 MMOL/L (ref 97–108)
CO2 SERPL-SCNC: 29 MMOL/L (ref 21–32)
CREAT SERPL-MCNC: 1.02 MG/DL (ref 0.55–1.02)
DIFFERENTIAL METHOD BLD: ABNORMAL
EOSINOPHIL # BLD: 0.2 K/UL (ref 0–0.4)
EOSINOPHIL NFR BLD: 4 % (ref 0–7)
ERYTHROCYTE [DISTWIDTH] IN BLOOD BY AUTOMATED COUNT: 17 % (ref 11.5–14.5)
GLOBULIN SER CALC-MCNC: 4.4 G/DL (ref 2–4)
GLUCOSE SERPL-MCNC: 192 MG/DL (ref 65–100)
HCT VFR BLD AUTO: 37.2 % (ref 35–47)
HGB BLD-MCNC: 12.1 G/DL (ref 11.5–16)
IMM GRANULOCYTES # BLD AUTO: 0 K/UL (ref 0–0.04)
IMM GRANULOCYTES NFR BLD AUTO: 0 % (ref 0–0.5)
LYMPHOCYTES # BLD: 1.6 K/UL (ref 0.8–3.5)
LYMPHOCYTES NFR BLD: 24 % (ref 12–49)
MAGNESIUM SERPL-MCNC: 2.1 MG/DL (ref 1.6–2.4)
MCH RBC QN AUTO: 28.9 PG (ref 26–34)
MCHC RBC AUTO-ENTMCNC: 32.5 G/DL (ref 30–36.5)
MCV RBC AUTO: 88.8 FL (ref 80–99)
MONOCYTES # BLD: 0.9 K/UL (ref 0–1)
MONOCYTES NFR BLD: 13 % (ref 5–13)
NEUTS SEG # BLD: 3.9 K/UL (ref 1.8–8)
NEUTS SEG NFR BLD: 59 % (ref 32–75)
NRBC # BLD: 0 K/UL (ref 0–0.01)
NRBC BLD-RTO: 0 PER 100 WBC
PLATELET # BLD AUTO: 290 K/UL (ref 150–400)
PMV BLD AUTO: 9.4 FL (ref 8.9–12.9)
POTASSIUM SERPL-SCNC: 3.5 MMOL/L (ref 3.5–5.1)
PROT SERPL-MCNC: 7.5 G/DL (ref 6.4–8.2)
RBC # BLD AUTO: 4.19 M/UL (ref 3.8–5.2)
SARS-COV-2 RDRP RESP QL NAA+PROBE: NOT DETECTED
SODIUM SERPL-SCNC: 141 MMOL/L (ref 136–145)
WBC # BLD AUTO: 6.6 K/UL (ref 3.6–11)

## 2023-10-31 PROCEDURE — 71045 X-RAY EXAM CHEST 1 VIEW: CPT

## 2023-10-31 PROCEDURE — 94640 AIRWAY INHALATION TREATMENT: CPT

## 2023-10-31 PROCEDURE — 85025 COMPLETE CBC W/AUTO DIFF WBC: CPT

## 2023-10-31 PROCEDURE — 87635 SARS-COV-2 COVID-19 AMP PRB: CPT

## 2023-10-31 PROCEDURE — 6370000000 HC RX 637 (ALT 250 FOR IP): Performed by: STUDENT IN AN ORGANIZED HEALTH CARE EDUCATION/TRAINING PROGRAM

## 2023-10-31 PROCEDURE — 36415 COLL VENOUS BLD VENIPUNCTURE: CPT

## 2023-10-31 PROCEDURE — 80053 COMPREHEN METABOLIC PANEL: CPT

## 2023-10-31 PROCEDURE — 99285 EMERGENCY DEPT VISIT HI MDM: CPT

## 2023-10-31 PROCEDURE — 83880 ASSAY OF NATRIURETIC PEPTIDE: CPT

## 2023-10-31 PROCEDURE — 83735 ASSAY OF MAGNESIUM: CPT

## 2023-10-31 RX ORDER — PREDNISONE 20 MG/1
40 TABLET ORAL DAILY
Qty: 8 TABLET | Refills: 0 | Status: SHIPPED | OUTPATIENT
Start: 2023-10-31 | End: 2023-11-04

## 2023-10-31 RX ORDER — DOXYCYCLINE HYCLATE 100 MG
100 TABLET ORAL 2 TIMES DAILY
Qty: 14 TABLET | Refills: 0 | Status: SHIPPED | OUTPATIENT
Start: 2023-10-31 | End: 2023-11-07

## 2023-10-31 RX ORDER — IPRATROPIUM BROMIDE AND ALBUTEROL SULFATE 2.5; .5 MG/3ML; MG/3ML
1 SOLUTION RESPIRATORY (INHALATION)
Status: COMPLETED | OUTPATIENT
Start: 2023-10-31 | End: 2023-10-31

## 2023-10-31 RX ORDER — PREDNISONE 20 MG/1
60 TABLET ORAL
Status: COMPLETED | OUTPATIENT
Start: 2023-10-31 | End: 2023-10-31

## 2023-10-31 RX ORDER — ALBUTEROL SULFATE 90 UG/1
2 AEROSOL, METERED RESPIRATORY (INHALATION) EVERY 4 HOURS PRN
Qty: 1 EACH | Refills: 0 | Status: SHIPPED | OUTPATIENT
Start: 2023-10-31

## 2023-10-31 RX ADMIN — PREDNISONE 60 MG: 20 TABLET ORAL at 01:38

## 2023-10-31 RX ADMIN — IPRATROPIUM BROMIDE AND ALBUTEROL SULFATE 1 DOSE: 2.5; .5 SOLUTION RESPIRATORY (INHALATION) at 01:38

## 2023-10-31 ASSESSMENT — PAIN - FUNCTIONAL ASSESSMENT
PAIN_FUNCTIONAL_ASSESSMENT: NONE - DENIES PAIN
PAIN_FUNCTIONAL_ASSESSMENT: NONE - DENIES PAIN

## 2023-10-31 ASSESSMENT — LIFESTYLE VARIABLES
HOW MANY STANDARD DRINKS CONTAINING ALCOHOL DO YOU HAVE ON A TYPICAL DAY: 1 OR 2
HOW OFTEN DO YOU HAVE A DRINK CONTAINING ALCOHOL: MONTHLY OR LESS

## 2023-10-31 NOTE — ED TRIAGE NOTES
Difficulty breathing x 2 days. Hx of lung ca and copd. Wheezing and cough. Duoneb and o23lnc by ems.  Pt denies pain

## 2023-10-31 NOTE — ED NOTES
Transportation has been arranged for this patient through insurance. [phone]. Wait time 30minutes to 3hours.   Trip number [id number] Nikki Martínez  10/31/23 2699

## 2023-10-31 NOTE — ED PROVIDER NOTES
[facility] EMERGENCY DEPT  EMERGENCY DEPARTMENT HISTORY AND PHYSICAL EXAM      Date: 10/31/2023  Patient Name: Lobo Agee  MRN: [id number]  [address] [facility] 1960  Date of evaluation: 10/31/2023  Provider: Elzbieta Jimenez MD   Note Started: 1:23 AM EDT 10/31/23    HISTORY OF PRESENT ILLNESS     Chief Complaint   Patient presents with    Shortness of Breath     History Provided By: Patient    HPI: Lobo Agee is a 58 y.o. female with PMH of COPD, DM, HTN, and lung cancer comes to the ED with shortness of breath. She is O2 dependent. Having progressive shortness of breath and productive sputum over the last week. She report that her lungs feels tight. However, no chest pain. Denies any abdominal pain, nausea or vomiting. No recent changes in her bowel, dater, renal habits. She has been reporting a runny nose for 3 days. No nasal congestion or sore throat. PAST MEDICAL HISTORY   Past Medical History:  Past Medical History:   Diagnosis Date    Cancer ([address] [facility])     COPD (chronic obstructive pulmonary disease) ([facility] [address] [facility])     Diabetes mellitus ([address] [facility])     Hypertension        Past Surgical History:  History reviewed. No pertinent surgical history. Family History:  History reviewed. No pertinent family history. Social History:  Social History     Tobacco Use    Smoking status: Some Days     Types: Cigarettes    Smokeless tobacco: Never   Vaping Use    Vaping Use: Never used   Substance Use Topics    Alcohol use: Yes    Drug use: Not Currently       Allergies: Allergies   Allergen Reactions    Lisinopril      Other reaction(s): Unknown (comments)    Seroquel [Quetiapine] Palpitations       PCP: No primary care provider on file.     Current Meds:   Current Facility-Administered Medications   Medication Dose Route Frequency Provider Last Rate Last Admin    albuterol (PROVENTIL) nebulizer solution  5 mg/hr Nebulization Q6H PRN Ese Ma MD         Current Outpatient Medications   Medication Sig Dispense Refill

## 2023-12-26 ENCOUNTER — HOSPITAL ENCOUNTER (EMERGENCY)
Facility: HOSPITAL | Age: 63
Discharge: HOME | End: 2023-12-26

## 2023-12-26 VITALS
OXYGEN SATURATION: 98 % | HEART RATE: 83 BPM | WEIGHT: 209 LBS | RESPIRATION RATE: 18 BRPM | HEIGHT: 66 IN | TEMPERATURE: 98.3 F | BODY MASS INDEX: 33.59 KG/M2

## 2023-12-26 NOTE — ED TRIAGE NOTES
Arrives to er with c/o left lower dental pain osnet today, agitated and irritable with triage , no bp measured dt pt request

## 2024-01-12 ENCOUNTER — HOSPITAL ENCOUNTER (INPATIENT)
Facility: HOSPITAL | Age: 64
LOS: 7 days | Discharge: HOME | End: 2024-01-19
Attending: STUDENT IN AN ORGANIZED HEALTH CARE EDUCATION/TRAINING PROGRAM | Admitting: INTERNAL MEDICINE
Payer: MEDICAID

## 2024-01-12 ENCOUNTER — APPOINTMENT (OUTPATIENT)
Facility: HOSPITAL | Age: 64
End: 2024-01-12
Payer: MEDICAID

## 2024-01-12 DIAGNOSIS — R41.82 ALTERED MENTAL STATUS, UNSPECIFIED ALTERED MENTAL STATUS TYPE: Primary | ICD-10-CM

## 2024-01-12 DIAGNOSIS — C79.31 METASTASIS TO BRAIN (HCC): ICD-10-CM

## 2024-01-12 PROBLEM — C34.92 METASTATIC LUNG CANCER (METASTASIS FROM LUNG TO OTHER SITE), LEFT (HCC): Status: ACTIVE | Noted: 2024-01-12

## 2024-01-12 LAB
ALBUMIN SERPL-MCNC: 3.4 G/DL (ref 3.5–5)
ALBUMIN/GLOB SERPL: 0.8 (ref 1.1–2.2)
ALP SERPL-CCNC: 124 U/L (ref 45–117)
ALT SERPL-CCNC: 23 U/L (ref 12–78)
AMMONIA PLAS-SCNC: <10 UMOL/L
ANION GAP SERPL CALC-SCNC: 5 MMOL/L (ref 5–15)
AST SERPL W P-5'-P-CCNC: 24 U/L (ref 15–37)
BASOPHILS # BLD: 0 K/UL (ref 0–0.1)
BASOPHILS NFR BLD: 1 % (ref 0–1)
BILIRUB SERPL-MCNC: 0.4 MG/DL (ref 0.2–1)
BUN SERPL-MCNC: 11 MG/DL (ref 6–20)
BUN/CREAT SERPL: 12 (ref 12–20)
CA-I BLD-MCNC: 9.8 MG/DL (ref 8.5–10.1)
CHLORIDE SERPL-SCNC: 106 MMOL/L (ref 97–108)
CO2 SERPL-SCNC: 28 MMOL/L (ref 21–32)
CREAT SERPL-MCNC: 0.91 MG/DL (ref 0.55–1.02)
DIFFERENTIAL METHOD BLD: ABNORMAL
EOSINOPHIL # BLD: 0.1 K/UL (ref 0–0.4)
EOSINOPHIL NFR BLD: 2 % (ref 0–7)
ERYTHROCYTE [DISTWIDTH] IN BLOOD BY AUTOMATED COUNT: 15.4 % (ref 11.5–14.5)
ETHANOL SERPL-MCNC: <10 MG/DL (ref 0–0.08)
GLOBULIN SER CALC-MCNC: 4.3 G/DL (ref 2–4)
GLUCOSE SERPL-MCNC: 138 MG/DL (ref 65–100)
HCT VFR BLD AUTO: 43.4 % (ref 35–47)
HGB BLD-MCNC: 14.1 G/DL (ref 11.5–16)
IMM GRANULOCYTES # BLD AUTO: 0 K/UL (ref 0–0.04)
IMM GRANULOCYTES NFR BLD AUTO: 0 % (ref 0–0.5)
LYMPHOCYTES # BLD: 0.9 K/UL (ref 0.8–3.5)
LYMPHOCYTES NFR BLD: 22 % (ref 12–49)
MCH RBC QN AUTO: 29 PG (ref 26–34)
MCHC RBC AUTO-ENTMCNC: 32.5 G/DL (ref 30–36.5)
MCV RBC AUTO: 89.1 FL (ref 80–99)
MONOCYTES # BLD: 0.8 K/UL (ref 0–1)
MONOCYTES NFR BLD: 20 % (ref 5–13)
NEUTS SEG # BLD: 2.1 K/UL (ref 1.8–8)
NEUTS SEG NFR BLD: 55 % (ref 32–75)
NRBC # BLD: 0 K/UL (ref 0–0.01)
NRBC BLD-RTO: 0 PER 100 WBC
PLATELET # BLD AUTO: 268 K/UL (ref 150–400)
PMV BLD AUTO: 8.9 FL (ref 8.9–12.9)
POTASSIUM SERPL-SCNC: 3.6 MMOL/L (ref 3.5–5.1)
PROT SERPL-MCNC: 7.7 G/DL (ref 6.4–8.2)
RBC # BLD AUTO: 4.87 M/UL (ref 3.8–5.2)
SODIUM SERPL-SCNC: 139 MMOL/L (ref 136–145)
TROPONIN I SERPL HS-MCNC: 51 NG/L (ref 0–51)
TSH SERPL DL<=0.05 MIU/L-ACNC: 0.29 UIU/ML (ref 0.36–3.74)
WBC # BLD AUTO: 3.8 K/UL (ref 3.6–11)

## 2024-01-12 PROCEDURE — 84484 ASSAY OF TROPONIN QUANT: CPT

## 2024-01-12 PROCEDURE — 70450 CT HEAD/BRAIN W/O DYE: CPT

## 2024-01-12 PROCEDURE — 93005 ELECTROCARDIOGRAM TRACING: CPT | Performed by: STUDENT IN AN ORGANIZED HEALTH CARE EDUCATION/TRAINING PROGRAM

## 2024-01-12 PROCEDURE — 1100000000 HC RM PRIVATE

## 2024-01-12 PROCEDURE — 80053 COMPREHEN METABOLIC PANEL: CPT

## 2024-01-12 PROCEDURE — 36415 COLL VENOUS BLD VENIPUNCTURE: CPT

## 2024-01-12 PROCEDURE — 99285 EMERGENCY DEPT VISIT HI MDM: CPT

## 2024-01-12 PROCEDURE — 82140 ASSAY OF AMMONIA: CPT

## 2024-01-12 PROCEDURE — 2580000003 HC RX 258: Performed by: STUDENT IN AN ORGANIZED HEALTH CARE EDUCATION/TRAINING PROGRAM

## 2024-01-12 PROCEDURE — 96374 THER/PROPH/DIAG INJ IV PUSH: CPT

## 2024-01-12 PROCEDURE — 6360000002 HC RX W HCPCS: Performed by: STUDENT IN AN ORGANIZED HEALTH CARE EDUCATION/TRAINING PROGRAM

## 2024-01-12 PROCEDURE — 84443 ASSAY THYROID STIM HORMONE: CPT

## 2024-01-12 PROCEDURE — 96361 HYDRATE IV INFUSION ADD-ON: CPT

## 2024-01-12 PROCEDURE — 85025 COMPLETE CBC W/AUTO DIFF WBC: CPT

## 2024-01-12 PROCEDURE — 82077 ASSAY SPEC XCP UR&BREATH IA: CPT

## 2024-01-12 RX ORDER — 0.9 % SODIUM CHLORIDE 0.9 %
500 INTRAVENOUS SOLUTION INTRAVENOUS ONCE
Status: COMPLETED | OUTPATIENT
Start: 2024-01-12 | End: 2024-01-12

## 2024-01-12 RX ORDER — DEXAMETHASONE SODIUM PHOSPHATE 10 MG/ML
10 INJECTION, SOLUTION INTRAMUSCULAR; INTRAVENOUS EVERY 6 HOURS
Status: DISCONTINUED | OUTPATIENT
Start: 2024-01-12 | End: 2024-01-16

## 2024-01-12 RX ADMIN — SODIUM CHLORIDE 500 ML: 9 INJECTION, SOLUTION INTRAVENOUS at 20:29

## 2024-01-12 RX ADMIN — DEXAMETHASONE SODIUM PHOSPHATE 10 MG: 10 INJECTION, SOLUTION INTRAMUSCULAR; INTRAVENOUS at 21:26

## 2024-01-12 ASSESSMENT — PAIN - FUNCTIONAL ASSESSMENT
PAIN_FUNCTIONAL_ASSESSMENT: NONE - DENIES PAIN
PAIN_FUNCTIONAL_ASSESSMENT: NONE - DENIES PAIN

## 2024-01-13 LAB
ANION GAP SERPL CALC-SCNC: 7 MMOL/L (ref 5–15)
BASOPHILS # BLD: 0 K/UL (ref 0–0.1)
BASOPHILS NFR BLD: 0 % (ref 0–1)
BUN SERPL-MCNC: 16 MG/DL (ref 6–20)
BUN/CREAT SERPL: 18 (ref 12–20)
CA-I BLD-MCNC: 9.6 MG/DL (ref 8.5–10.1)
CHLORIDE SERPL-SCNC: 107 MMOL/L (ref 97–108)
CO2 SERPL-SCNC: 24 MMOL/L (ref 21–32)
CREAT SERPL-MCNC: 0.88 MG/DL (ref 0.55–1.02)
DIFFERENTIAL METHOD BLD: ABNORMAL
EKG ATRIAL RATE: 101 BPM
EKG DIAGNOSIS: NORMAL
EKG P AXIS: 76 DEGREES
EKG P-R INTERVAL: 164 MS
EKG Q-T INTERVAL: 376 MS
EKG QRS DURATION: 72 MS
EKG QTC CALCULATION (BAZETT): 487 MS
EKG R AXIS: -41 DEGREES
EKG T AXIS: 61 DEGREES
EKG VENTRICULAR RATE: 101 BPM
EOSINOPHIL # BLD: 0 K/UL (ref 0–0.4)
EOSINOPHIL NFR BLD: 0 % (ref 0–7)
ERYTHROCYTE [DISTWIDTH] IN BLOOD BY AUTOMATED COUNT: 15.2 % (ref 11.5–14.5)
GLUCOSE BLD STRIP.AUTO-MCNC: 215 MG/DL (ref 65–100)
GLUCOSE BLD STRIP.AUTO-MCNC: 289 MG/DL (ref 65–100)
GLUCOSE BLD STRIP.AUTO-MCNC: 360 MG/DL (ref 65–100)
GLUCOSE SERPL-MCNC: 245 MG/DL (ref 65–100)
HCT VFR BLD AUTO: 41.5 % (ref 35–47)
HGB BLD-MCNC: 13.5 G/DL (ref 11.5–16)
IMM GRANULOCYTES # BLD AUTO: 0 K/UL (ref 0–0.04)
IMM GRANULOCYTES NFR BLD AUTO: 0 % (ref 0–0.5)
LYMPHOCYTES # BLD: 0.6 K/UL (ref 0.8–3.5)
LYMPHOCYTES NFR BLD: 19 % (ref 12–49)
MCH RBC QN AUTO: 28.7 PG (ref 26–34)
MCHC RBC AUTO-ENTMCNC: 32.5 G/DL (ref 30–36.5)
MCV RBC AUTO: 88.3 FL (ref 80–99)
MONOCYTES # BLD: 0.2 K/UL (ref 0–1)
MONOCYTES NFR BLD: 7 % (ref 5–13)
NEUTS SEG # BLD: 2.4 K/UL (ref 1.8–8)
NEUTS SEG NFR BLD: 74 % (ref 32–75)
NRBC # BLD: 0 K/UL (ref 0–0.01)
NRBC BLD-RTO: 0 PER 100 WBC
PERFORMED BY:: ABNORMAL
PLATELET # BLD AUTO: 280 K/UL (ref 150–400)
PMV BLD AUTO: 9.8 FL (ref 8.9–12.9)
POTASSIUM SERPL-SCNC: 3.5 MMOL/L (ref 3.5–5.1)
RBC # BLD AUTO: 4.7 M/UL (ref 3.8–5.2)
SODIUM SERPL-SCNC: 138 MMOL/L (ref 136–145)
WBC # BLD AUTO: 3.2 K/UL (ref 3.6–11)

## 2024-01-13 PROCEDURE — 2580000003 HC RX 258: Performed by: INTERNAL MEDICINE

## 2024-01-13 PROCEDURE — 36415 COLL VENOUS BLD VENIPUNCTURE: CPT

## 2024-01-13 PROCEDURE — 82962 GLUCOSE BLOOD TEST: CPT

## 2024-01-13 PROCEDURE — 80048 BASIC METABOLIC PNL TOTAL CA: CPT

## 2024-01-13 PROCEDURE — 6370000000 HC RX 637 (ALT 250 FOR IP): Performed by: INTERNAL MEDICINE

## 2024-01-13 PROCEDURE — 6360000002 HC RX W HCPCS: Performed by: INTERNAL MEDICINE

## 2024-01-13 PROCEDURE — 85025 COMPLETE CBC W/AUTO DIFF WBC: CPT

## 2024-01-13 PROCEDURE — 1100000000 HC RM PRIVATE

## 2024-01-13 RX ORDER — ONDANSETRON 4 MG/1
4 TABLET, ORALLY DISINTEGRATING ORAL EVERY 8 HOURS PRN
Status: DISCONTINUED | OUTPATIENT
Start: 2024-01-13 | End: 2024-01-19 | Stop reason: HOSPADM

## 2024-01-13 RX ORDER — HALOPERIDOL 5 MG/ML
5 INJECTION INTRAMUSCULAR ONCE
Status: DISCONTINUED | OUTPATIENT
Start: 2024-01-13 | End: 2024-01-19 | Stop reason: HOSPADM

## 2024-01-13 RX ORDER — ACETAMINOPHEN 325 MG/1
650 TABLET ORAL EVERY 6 HOURS PRN
Status: DISCONTINUED | OUTPATIENT
Start: 2024-01-13 | End: 2024-01-19 | Stop reason: HOSPADM

## 2024-01-13 RX ORDER — HYDROCHLOROTHIAZIDE 25 MG/1
25 TABLET ORAL DAILY
Status: DISCONTINUED | OUTPATIENT
Start: 2024-01-13 | End: 2024-01-19 | Stop reason: HOSPADM

## 2024-01-13 RX ORDER — ACETAMINOPHEN 650 MG/1
650 SUPPOSITORY RECTAL EVERY 6 HOURS PRN
Status: DISCONTINUED | OUTPATIENT
Start: 2024-01-13 | End: 2024-01-19 | Stop reason: HOSPADM

## 2024-01-13 RX ORDER — POTASSIUM CHLORIDE 20 MEQ/1
40 TABLET, EXTENDED RELEASE ORAL PRN
Status: DISCONTINUED | OUTPATIENT
Start: 2024-01-13 | End: 2024-01-19 | Stop reason: HOSPADM

## 2024-01-13 RX ORDER — ONDANSETRON 2 MG/ML
4 INJECTION INTRAMUSCULAR; INTRAVENOUS EVERY 6 HOURS PRN
Status: DISCONTINUED | OUTPATIENT
Start: 2024-01-13 | End: 2024-01-19 | Stop reason: HOSPADM

## 2024-01-13 RX ORDER — IPRATROPIUM BROMIDE AND ALBUTEROL SULFATE 2.5; .5 MG/3ML; MG/3ML
1 SOLUTION RESPIRATORY (INHALATION) EVERY 4 HOURS PRN
Status: DISCONTINUED | OUTPATIENT
Start: 2024-01-13 | End: 2024-01-19 | Stop reason: HOSPADM

## 2024-01-13 RX ORDER — IPRATROPIUM BROMIDE AND ALBUTEROL SULFATE 2.5; .5 MG/3ML; MG/3ML
1 SOLUTION RESPIRATORY (INHALATION)
Status: DISCONTINUED | OUTPATIENT
Start: 2024-01-13 | End: 2024-01-13

## 2024-01-13 RX ORDER — MAGNESIUM SULFATE IN WATER 40 MG/ML
2000 INJECTION, SOLUTION INTRAVENOUS PRN
Status: DISCONTINUED | OUTPATIENT
Start: 2024-01-13 | End: 2024-01-19 | Stop reason: HOSPADM

## 2024-01-13 RX ORDER — LABETALOL HYDROCHLORIDE 5 MG/ML
10 INJECTION, SOLUTION INTRAVENOUS EVERY 4 HOURS PRN
Status: DISCONTINUED | OUTPATIENT
Start: 2024-01-13 | End: 2024-01-19 | Stop reason: HOSPADM

## 2024-01-13 RX ORDER — SODIUM CHLORIDE 0.9 % (FLUSH) 0.9 %
5-40 SYRINGE (ML) INJECTION EVERY 12 HOURS SCHEDULED
Status: DISCONTINUED | OUTPATIENT
Start: 2024-01-13 | End: 2024-01-19 | Stop reason: HOSPADM

## 2024-01-13 RX ORDER — SODIUM CHLORIDE 9 MG/ML
INJECTION, SOLUTION INTRAVENOUS PRN
Status: DISCONTINUED | OUTPATIENT
Start: 2024-01-13 | End: 2024-01-19 | Stop reason: HOSPADM

## 2024-01-13 RX ORDER — AMLODIPINE BESYLATE 5 MG/1
10 TABLET ORAL DAILY
Status: DISCONTINUED | OUTPATIENT
Start: 2024-01-13 | End: 2024-01-19 | Stop reason: HOSPADM

## 2024-01-13 RX ORDER — ENOXAPARIN SODIUM 100 MG/ML
40 INJECTION SUBCUTANEOUS DAILY
Status: DISCONTINUED | OUTPATIENT
Start: 2024-01-13 | End: 2024-01-19 | Stop reason: HOSPADM

## 2024-01-13 RX ORDER — POTASSIUM CHLORIDE 7.45 MG/ML
10 INJECTION INTRAVENOUS PRN
Status: DISCONTINUED | OUTPATIENT
Start: 2024-01-13 | End: 2024-01-19 | Stop reason: HOSPADM

## 2024-01-13 RX ORDER — HALOPERIDOL 5 MG/ML
2 INJECTION INTRAMUSCULAR EVERY 6 HOURS PRN
Status: DISCONTINUED | OUTPATIENT
Start: 2024-01-13 | End: 2024-01-19 | Stop reason: HOSPADM

## 2024-01-13 RX ORDER — HALOPERIDOL 5 MG/ML
INJECTION INTRAMUSCULAR
Status: DISCONTINUED
Start: 2024-01-13 | End: 2024-01-13 | Stop reason: WASHOUT

## 2024-01-13 RX ORDER — CHOLECALCIFEROL (VITAMIN D3) 125 MCG
5 CAPSULE ORAL NIGHTLY
Status: DISCONTINUED | OUTPATIENT
Start: 2024-01-13 | End: 2024-01-19 | Stop reason: HOSPADM

## 2024-01-13 RX ORDER — POLYETHYLENE GLYCOL 3350 17 G/17G
17 POWDER, FOR SOLUTION ORAL DAILY PRN
Status: DISCONTINUED | OUTPATIENT
Start: 2024-01-13 | End: 2024-01-19 | Stop reason: HOSPADM

## 2024-01-13 RX ORDER — SODIUM CHLORIDE 0.9 % (FLUSH) 0.9 %
5-40 SYRINGE (ML) INJECTION PRN
Status: DISCONTINUED | OUTPATIENT
Start: 2024-01-13 | End: 2024-01-19 | Stop reason: HOSPADM

## 2024-01-13 RX ADMIN — LABETALOL HYDROCHLORIDE 10 MG: 5 INJECTION, SOLUTION INTRAVENOUS at 05:42

## 2024-01-13 RX ADMIN — HYDROCHLOROTHIAZIDE 25 MG: 25 TABLET ORAL at 08:50

## 2024-01-13 RX ADMIN — DEXAMETHASONE SODIUM PHOSPHATE 10 MG: 10 INJECTION, SOLUTION INTRAMUSCULAR; INTRAVENOUS at 20:32

## 2024-01-13 RX ADMIN — SODIUM CHLORIDE, PRESERVATIVE FREE 10 ML: 5 INJECTION INTRAVENOUS at 08:56

## 2024-01-13 RX ADMIN — SODIUM CHLORIDE, PRESERVATIVE FREE 10 ML: 5 INJECTION INTRAVENOUS at 22:23

## 2024-01-13 RX ADMIN — DEXAMETHASONE SODIUM PHOSPHATE 10 MG: 10 INJECTION, SOLUTION INTRAMUSCULAR; INTRAVENOUS at 05:16

## 2024-01-13 RX ADMIN — DEXAMETHASONE SODIUM PHOSPHATE 10 MG: 10 INJECTION, SOLUTION INTRAMUSCULAR; INTRAVENOUS at 08:49

## 2024-01-13 RX ADMIN — SODIUM CHLORIDE, PRESERVATIVE FREE 10 ML: 5 INJECTION INTRAVENOUS at 08:53

## 2024-01-13 RX ADMIN — AMLODIPINE BESYLATE 10 MG: 5 TABLET ORAL at 08:50

## 2024-01-13 RX ADMIN — DEXAMETHASONE SODIUM PHOSPHATE 10 MG: 10 INJECTION, SOLUTION INTRAMUSCULAR; INTRAVENOUS at 17:20

## 2024-01-13 RX ADMIN — Medication 5 MG: at 20:32

## 2024-01-13 RX ADMIN — ENOXAPARIN SODIUM 40 MG: 100 INJECTION SUBCUTANEOUS at 08:48

## 2024-01-13 ASSESSMENT — LIFESTYLE VARIABLES
HOW MANY STANDARD DRINKS CONTAINING ALCOHOL DO YOU HAVE ON A TYPICAL DAY: PATIENT UNABLE TO ANSWER
HOW OFTEN DO YOU HAVE A DRINK CONTAINING ALCOHOL: PATIENT UNABLE TO ANSWER

## 2024-01-13 NOTE — ED TRIAGE NOTES
EMS called for constipation, found another FD on scene who had given narcan for AMS and pinpoint pupils.  Pt alert, amb with assistance but confused upon arrival to ED.  Accucheck 123 per ems.

## 2024-01-13 NOTE — CONSULTS
Hematology Oncology Consultation    Reason for consult: Small cell lung cancer    Admitting Diagnosis: Metastatic lung cancer (metastasis from lung to other site), left (HCC) [C34.92]    Impression:     Small cell lung cancer  -presented in 1/2022 with CNS mets  -s/p gamma knife for this; front line therapy and now on immunotherapy maintance  -active with Neurosurgery in [facility] for CNS mets  -CT imaging in Er with multiple mass and likely small midline deviation in addition to vasogenic edema  -now on steroids  -awaiting degree of Mental status resolution on steroids to determine next steps  -if good improvement then will need MRI and XRT to plan next gamma knife; if no improvement or active decline then will need hospice    COPD    DM    Discussion: Patience Porras is a  63 y.o. year old seen in consultation at the request of Dr. Patel for CNS mets.    She is a known patient of Dr Barrett who presented to [facility] with LLL lesion in 1/2022; biopsy proven small cell lung cancer.  Staging evaluation at that time showed small cns lesions for which she underwent gamma knife at .    She underwent chemotherapy and is now on immunotherapy maintance  She called EMS reportedly for consipation, but was confused on presentation,  this prompted transport to the ER where CT of the brain showed multiple CNS lesions  She was admitted and placed on IV decadron and this service was consulted to help with next steps of care  Imaging:    reviewed    Labs:    Recent Results (from the past 24 hour(s))   EKG 12 Lead    Collection Time: 01/12/24  7:41 PM   Result Value Ref Range    Ventricular Rate 101 BPM    Atrial Rate 101 BPM    P-R Interval 164 ms    QRS Duration 72 ms    Q-T Interval 376 ms    QTc Calculation (Bazett) 487 ms    P Axis 76 degrees    R Axis -41 degrees    T Axis 61 degrees    Diagnosis       Sinus tachycardia with frequent Premature ventricular complexes  Biatrial enlargement  Left axis deviation  Pulmonary disease

## 2024-01-13 NOTE — PROGRESS NOTES
Hospitalist Progress Note    NAME:   Patience Porras   : 1960   MRN: [id number]     Date/Time: 2024 12:06 PM  Patient PCP: No primary care provider on file.    Estimated discharge date: 24 hours  Barriers: Oncology consult/clearance       HOSPITAL COURSE:    Patience Porras is a 63 y.o. female with COPD, diabetes, hypertension and lung cancer who presented to the ED with chief complaint of altered mental status. No additional history obtained from patient due to poor mentation. No family at bedside. Reportedly EMS, called for constipation. However she did not report constipation or abdominal pain while in the ED.      In the ED, noted hypertensive. Lab work grossly within normal limits. CT head showed multiple brain masses concerning for metastasis, no acute hemorrhage. Patient admitted for further management. Oncology consult pending. Patient would benefit from palliative care.    Assessment / Plan:    1. Metastatic cancer to brain  - Uncertain of primary. Consult oncology  - Given metastasis to brain, very poor prognosis. Will consult palliative care  - IV decadron  - Spoke to daughter, Елена Cortez, about result findings and overall prognosis. She will be reaching out to family members.      2. Altered mental status  - Likely related to brain metastasis   - However unable to rule out underlying psychiatric disorder. IM haldol PRN for now.      3. Diabetes  - Uncertain home medications  - POC BS + sliding scale insulin  - HbA1c      4. Hypertension   - Continue home medications.    - IV labetalol PRN      5. COPD  - Not in exacerbation          Medical Decision Making:   I personally reviewed labs: CBC, CMP  I personally reviewed imaging: CT head   Toxic drug monitoring: Lovenox for signs of bleeding   Discussed case with: patient and nursing. Spoke with daughter, Елена Cortez. Discussed CT head findings and poor prognosis. She will be reaching out to family for goals of care  not in acute distress.  HENT:      Head: Normocephalic and atraumatic.   Eyes:      Extraocular Movements: Extraocular movements intact.      Pupils: Pupils are equal, round, and reactive to light.   Cardiovascular:      Rate and Rhythm: Normal rate and regular rhythm.      Heart sounds: No murmur heard.     No gallop.   Pulmonary:      Effort: Pulmonary effort is normal.      Breath sounds: No wheezing, rhonchi or rales.   Abdominal:      General: Abdomen is flat. There is no distension.      Palpations: Abdomen is soft.      Tenderness: There is no abdominal tenderness.   Musculoskeletal:         General: Normal range of motion.      Right lower leg: No edema.      Left lower leg: No edema.   Skin:     General: Skin is warm and dry.      Findings: No rash.   Neurological:      Mental Status: She is alert. She is disoriented.      Cranial Nerves: No cranial nerve deficit.   Psychiatric:         Behavior: Behavior normal.          Reviewed most current lab test results and cultures  YES  Reviewed most current radiology test results   YES  Review and summation of old records today    NO  Reviewed patient's current orders and MAR    YES  PMH/SH reviewed - no change compared to H&P  ________________________________________________________________________  Care Plan discussed with:    Comments   Patient x    Family      RN x    Care Manager     Consultant                        Multidiciplinary team rounds were held today with , nursing, pharmacist and clinical coordinator.  Patient's plan of care was discussed; medications were reviewed and discharge planning was addressed.     ________________________________________________________________________  Total NON critical care TIME:  35  Minutes    Total CRITICAL CARE TIME Spent:   Minutes non procedure based      Comments   >50% of visit spent in counseling and coordination of care

## 2024-01-13 NOTE — PROGRESS NOTES
4 Eyes Skin Assessment     NAME:  Patience Porras  YOB: 1960  MEDICAL RECORD NUMBER:  [id number]    The patient is being assessed for  Admission    I agree that at least one RN has performed a thorough Head to Toe Skin Assessment on the patient. ALL assessment sites listed below have been assessed.      Areas assessed by both nurses:    Head, Face, Ears, Shoulders, Back, Chest, Arms, Elbows, Hands, Sacrum. Buttock, Coccyx, Ischium, Legs. Feet and Heels, and Under Medical Devices         Does the Patient have a Wound? No noted wound(s)       Alejandro Prevention initiated by RN: Yes  Wound Care Orders initiated by RN: No    Pressure Injury (Stage 3,4, Unstageable, DTI, NWPT, and Complex wounds) if present, place Wound referral order by RN under : No    New Ostomies, if present place, Ostomy referral order under : No     Nurse 1 eSignature: Electronically signed by Josef Lazaro RN on 1/13/24 at 2:26 AM EST    **SHARE this note so that the co-signing nurse can place an eSignature**    Nurse 2 eSignature: {Esignature:[id number]}

## 2024-01-13 NOTE — ED PROVIDER NOTES
[facility] ONCOLOGY  EMERGENCY DEPARTMENT HISTORY AND PHYSICAL EXAM      Date: 1/12/2024  Patient Name: Patience Porras  MRN: [id number]  Birthdate 1960  Date of evaluation: 1/12/2024  Provider: Ese Ma MD   Note Started: 7:48 PM EST 1/12/24    HISTORY OF PRESENT ILLNESS     Chief Complaint   Patient presents with    Altered Mental Status       History Provided By: Patient and EMS    HPI: Patience Porras is a 63 y.o. female with PMH of DM, HTN, COPD, and cancer comes to the ED with altered mental status.  Reportedly patient called EMS for constipation.  Per report, found another fire department truck seen for individual with AMS.  Patient ambulated to the ambulance.  She appears to be confused from baseline.  Patient was unable to provide any history due to her confusion.    PAST MEDICAL HISTORY   Past Medical History:  Past Medical History:   Diagnosis Date    Cancer (HCC)     COPD (chronic obstructive pulmonary disease) (HCC)     Diabetes mellitus (HCC)     Hypertension        Past Surgical History:  History reviewed. No pertinent surgical history.    Family History:  History reviewed. No pertinent family history.    Social History:  Social History     Tobacco Use    Smoking status: Some Days     Types: Cigarettes    Smokeless tobacco: Never   Vaping Use    Vaping Use: Never used   Substance Use Topics    Alcohol use: Yes    Drug use: Not Currently       Allergies:  Allergies   Allergen Reactions    Lisinopril      Other reaction(s): Unknown (comments)    Seroquel [Quetiapine] Palpitations       PCP: No primary care provider on file.    Current Meds:   Current Facility-Administered Medications   Medication Dose Route Frequency Provider Last Rate Last Admin    gadobenate dimeglumine (MULTIHANCE) injection 18 mL  18 mL IntraVENous ONCE PRN Gumaro Brennan MD        insulin lispro (HUMALOG) injection vial 0-8 Units  0-8 Units SubCUTAneous TID Noe Zavala PA-C   6 Units at 01/15/24  1/15/24 0831)   sodium chloride flush 0.9 % injection 5-40 mL (10 mLs IntraVENous Given 1/13/24 2223)   0.9 % sodium chloride infusion (has no administration in time range)   potassium chloride (KLOR-CON M) extended release tablet 40 mEq (has no administration in time range)     Or   potassium bicarb-citric acid (EFFER-K) effervescent tablet 40 mEq (has no administration in time range)     Or   potassium chloride 10 mEq/100 mL IVPB (Peripheral Line) (has no administration in time range)   magnesium sulfate 2000 mg in 50 mL IVPB premix (has no administration in time range)   enoxaparin (LOVENOX) injection 40 mg (40 mg SubCUTAneous Given 1/15/24 0831)   ondansetron (ZOFRAN-ODT) disintegrating tablet 4 mg (has no administration in time range)     Or   ondansetron (ZOFRAN) injection 4 mg (has no administration in time range)   polyethylene glycol (GLYCOLAX) packet 17 g (has no administration in time range)   acetaminophen (TYLENOL) tablet 650 mg (has no administration in time range)     Or   acetaminophen (TYLENOL) suppository 650 mg (has no administration in time range)   haloperidol lactate (HALDOL) injection 5 mg (0 mg IntraMUSCular Held 1/13/24 0038)   melatonin tablet 5 mg (5 mg Oral Not Given 1/14/24 2033)   haloperidol lactate (HALDOL) injection 2 mg (has no administration in time range)   amLODIPine (NORVASC) tablet 10 mg (10 mg Oral Given 1/15/24 0830)   hydroCHLOROthiazide (HYDRODIURIL) tablet 25 mg (25 mg Oral Given 1/15/24 0830)   ipratropium 0.5 mg-albuterol 2.5 mg (DUONEB) nebulizer solution 1 Dose (has no administration in time range)   insulin lispro (HUMALOG) injection vial 0-8 Units (6 Units SubCUTAneous Given 1/15/24 1203)   insulin lispro (HUMALOG) injection vial 0-4 Units (4 Units SubCUTAneous Not Given 1/14/24 2032)   glucose chewable tablet 16 g (has no administration in time range)   dextrose bolus 10% 125 mL (has no administration in time range)     Or   dextrose bolus 10% 250 mL (has no

## 2024-01-13 NOTE — ED NOTES
ED TO INPATIENT SBAR HANDOFF    Patient Name: Patience Porras   Preferred Name: Patience  : 1960  63 y.o.   Family/Caregiver Present: no   Code Status Order: Full Code  PO Status: Regular  Telemetry Order:   C-SSRS: Risk of Suicide: No Risk  Sitter Safety  Restraints:     Sepsis Risk Score Sepsis Risk Score: 1    Situation  Chief Complaint   Patient presents with    Altered Mental Status     Brief Description of Patient's Condition: EMS called for constipation, AMS and pinpoint pupils, EMS gave narcan. Pt alert and ambulate with assistance but confused upon arrival to ED. Accucheck 123 per EMS.   Mental Status: alert  Arrived from:Home  Imaging:   CT head without contrast   Final Result   Multiple brain masses, concerning for metastases. 2 mm rightward midline shift.   No acute hemorrhage.      I discussed this impression with Dr. Mack at 2058 hours on 2024.      789                 Abnormal labs:   Abnormal Labs Reviewed   CBC WITH AUTO DIFFERENTIAL - Abnormal; Notable for the following components:       Result Value    RDW 15.4 (*)     Monocytes % 20 (*)     All other components within normal limits   COMPREHENSIVE METABOLIC PANEL W/ REFLEX TO MG FOR LOW K - Abnormal; Notable for the following components:    Glucose 138 (*)     Alk Phosphatase 124 (*)     Albumin 3.4 (*)     Globulin 4.3 (*)     Albumin/Globulin Ratio 0.8 (*)     All other components within normal limits   TSH - Abnormal; Notable for the following components:    TSH, 3RD GENERATION 0.29 (*)     All other components within normal limits       Background  Allergies:   Allergies   Allergen Reactions    Lisinopril      Other reaction(s): Unknown (comments)    Seroquel [Quetiapine] Palpitations     History:   Past Medical History:   Diagnosis Date    Cancer (HCC)     COPD (chronic obstructive pulmonary disease) (HCC)     Diabetes mellitus (HCC)     Hypertension        Assessment  Vitals: MEWS Score: 1  Level of Consciousness: Alert

## 2024-01-13 NOTE — ED NOTES
Found a cell phone in area around where patient was treated initially (station 1 loop) after patient had been moved to station 3 and then transferred to room 407 upstairs.  No way to identify if phone belongs to patient as it is password protected.  Called [facility]E to have staff ask patient if she came in with a cell phone.  Patient is confused and cannot remember if she has a cell phone.  Cell phone with pink case and denton flower as screen saver given to security at this time.

## 2024-01-13 NOTE — H&P
History & Physical    Primary Care Provider: No primary care provider on file.    Chief complaint:   Chief Complaint   Patient presents with    Altered Mental Status        History of Presenting Illness:   Patience Porras is a 63 y.o. female with PMH of COPD, diabetes, hypertension and lung cancer.  Presented to the ED with chief complaint of altered mental status. No additional history obtained from patient due to poor mentation. No family at bedside. Reportedly EMS called for constipation. However she did not reports constipation or abdominal pain to me.     In the ED, noted hypertensive. Lab work grossly within normal limits. CT head showed multiple brain masses concerning for metastasis, no acute hemorrhage. Uncertain primary, ED physician reports lung cancer.       Chart review: none          Past Medical History:   Diagnosis Date    Cancer (HCC)     COPD (chronic obstructive pulmonary disease) (HCC)     Diabetes mellitus (HCC)     Hypertension         No past surgical history on file.    No family history on file.     Social History     Socioeconomic History    Marital status:    Tobacco Use    Smoking status: Some Days     Types: Cigarettes    Smokeless tobacco: Never   Vaping Use    Vaping Use: Never used   Substance and Sexual Activity    Alcohol use: Yes    Drug use: Not Currently        PTA medications and allergies per EMR.     Objective:     Physical Exam:     BP (!) 142/72   Pulse 82   Temp 98 °F (36.7 °C) (Oral)   Resp 16   Ht 1.676 m (5' 6\")   Wt 86.2 kg (190 lb)   SpO2 95%   BMI 30.67 kg/m²    O2 Device: None (Room air)    General: confused, not cooperative, mild-moderate distress  Head & neck: Omitted as patient is unable to cooperate.    Eyes:Omitted as patient is unable to cooperate.    Oral: Omitted as patient is unable to cooperate.     Lungs: Clear to auscultation bilaterally.   Heart: Regular rate and rhythm, S1, S2 normal, no murmur, click, rub or gallop.  Abdomen: Omitted

## 2024-01-13 NOTE — RT PROTOCOL NOTE
RT Nebulizer Bronchodilator Protocol Note    There is a bronchodilator order in the chart from a provider indicating to follow the RT Bronchodilator Protocol and there is an “Initiate RT Bronchodilator Protocol” order as well (see protocol at bottom of note).    CXR Findings:  No results found.    The findings from the last RT Protocol Assessment were as follows:  Smoking: Chronic pulmonary disease  Respiratory Pattern: Regular pattern and RR 12-20 bpm  Breath Sounds: Clear breath sounds  Cough: Strong, spontaneous, non-productive  Indication for Bronchodilator Therapy: None  Bronchodilator Assessment Score: 2    Aerosolized bronchodilator medication orders have been revised according to the RT Nebulizer Bronchodilator Protocol below.    Respiratory Therapist to perform RT Therapy Protocol Assessment initially then follow the protocol.  Repeat RT Therapy Protocol Assessment PRN for score 0-3 or on second treatment, BID, and PRN for scores above 3.    No Indications - adjust the frequency to every 6 hours PRN wheezing or bronchospasm, if no treatments needed after 48 hours then discontinue using Per Protocol order mode.     If indication present, adjust the RT bronchodilator orders based on the Bronchodilator Assessment Score as indicated below.  If a patient is on this medication at home then do not decrease Frequency below that used at home.    0-3 - enter or revise RT bronchodilator order(s) to equivalent RT Bronchodilator order with Frequency of every 4 hours PRN for wheezing or increased work of breathing using Per Protocol order mode.       4-6 - enter or revise RT Bronchodilator order(s) to two equivalent RT bronchodilator orders with one order with BID Frequency and one order with Frequency of every 4 hours PRN wheezing or increased work of breathing using Per Protocol order mode.         7-10 - enter or revise RT Bronchodilator order(s) to two equivalent RT bronchodilator orders with one order with TID  Frequency and one order with Frequency of every 4 hours PRN wheezing or increased work of breathing using Per Protocol order mode.       11-13 - enter or revise RT Bronchodilator order(s) to one equivalent RT bronchodilator order with QID Frequency and an Albuterol order with Frequency of every 4 hours PRN wheezing or increased work of breathing using Per Protocol order mode.      Greater than 13 - enter or revise RT Bronchodilator order(s) to one equivalent RT bronchodilator order with every 4 hours Frequency and an Albuterol order with Frequency of every 2 hours PRN wheezing or increased work of breathing using Per Protocol order mode.     RT to enter RT Home Evaluation for COPD & MDI Assessment order using Per Protocol order mode.    Electronically signed by Juliette Pitt RT on 1/13/2024 at 6:40 AM

## 2024-01-14 LAB
ANION GAP SERPL CALC-SCNC: 7 MMOL/L (ref 5–15)
BASOPHILS # BLD: 0 K/UL (ref 0–0.1)
BASOPHILS NFR BLD: 0 % (ref 0–1)
BUN SERPL-MCNC: 17 MG/DL (ref 6–20)
BUN/CREAT SERPL: 18 (ref 12–20)
CA-I BLD-MCNC: 9.9 MG/DL (ref 8.5–10.1)
CHLORIDE SERPL-SCNC: 103 MMOL/L (ref 97–108)
CO2 SERPL-SCNC: 26 MMOL/L (ref 21–32)
CREAT SERPL-MCNC: 0.94 MG/DL (ref 0.55–1.02)
DIFFERENTIAL METHOD BLD: ABNORMAL
EOSINOPHIL # BLD: 0 K/UL (ref 0–0.4)
EOSINOPHIL NFR BLD: 0 % (ref 0–7)
ERYTHROCYTE [DISTWIDTH] IN BLOOD BY AUTOMATED COUNT: 15.4 % (ref 11.5–14.5)
EST. AVERAGE GLUCOSE BLD GHB EST-MCNC: 223 MG/DL
GLUCOSE BLD STRIP.AUTO-MCNC: 280 MG/DL (ref 65–100)
GLUCOSE BLD STRIP.AUTO-MCNC: 297 MG/DL (ref 65–100)
GLUCOSE BLD STRIP.AUTO-MCNC: 317 MG/DL (ref 65–100)
GLUCOSE BLD STRIP.AUTO-MCNC: 388 MG/DL (ref 65–100)
GLUCOSE SERPL-MCNC: 275 MG/DL (ref 65–100)
HBA1C MFR BLD: 9.4 % (ref 4–5.6)
HCT VFR BLD AUTO: 39.2 % (ref 35–47)
HGB BLD-MCNC: 13 G/DL (ref 11.5–16)
IMM GRANULOCYTES # BLD AUTO: 0 K/UL (ref 0–0.04)
IMM GRANULOCYTES NFR BLD AUTO: 0 % (ref 0–0.5)
LYMPHOCYTES # BLD: 0.8 K/UL (ref 0.8–3.5)
LYMPHOCYTES NFR BLD: 10 % (ref 12–49)
MCH RBC QN AUTO: 28.7 PG (ref 26–34)
MCHC RBC AUTO-ENTMCNC: 33.2 G/DL (ref 30–36.5)
MCV RBC AUTO: 86.5 FL (ref 80–99)
MONOCYTES # BLD: 0.6 K/UL (ref 0–1)
MONOCYTES NFR BLD: 7 % (ref 5–13)
NEUTS SEG # BLD: 6.9 K/UL (ref 1.8–8)
NEUTS SEG NFR BLD: 83 % (ref 32–75)
NRBC # BLD: 0 K/UL (ref 0–0.01)
NRBC BLD-RTO: 0 PER 100 WBC
PERFORMED BY:: ABNORMAL
PLATELET # BLD AUTO: 292 K/UL (ref 150–400)
PMV BLD AUTO: 9.8 FL (ref 8.9–12.9)
POTASSIUM SERPL-SCNC: 3.6 MMOL/L (ref 3.5–5.1)
RBC # BLD AUTO: 4.53 M/UL (ref 3.8–5.2)
SODIUM SERPL-SCNC: 136 MMOL/L (ref 136–145)
T3FREE SERPL-MCNC: 2.1 PG/ML (ref 2.2–4)
T4 FREE SERPL-MCNC: 1.1 NG/DL (ref 0.8–1.5)
WBC # BLD AUTO: 8.3 K/UL (ref 3.6–11)

## 2024-01-14 PROCEDURE — 82962 GLUCOSE BLOOD TEST: CPT

## 2024-01-14 PROCEDURE — 83036 HEMOGLOBIN GLYCOSYLATED A1C: CPT

## 2024-01-14 PROCEDURE — 6370000000 HC RX 637 (ALT 250 FOR IP): Performed by: STUDENT IN AN ORGANIZED HEALTH CARE EDUCATION/TRAINING PROGRAM

## 2024-01-14 PROCEDURE — 2580000003 HC RX 258: Performed by: INTERNAL MEDICINE

## 2024-01-14 PROCEDURE — 84439 ASSAY OF FREE THYROXINE: CPT

## 2024-01-14 PROCEDURE — 6370000000 HC RX 637 (ALT 250 FOR IP): Performed by: INTERNAL MEDICINE

## 2024-01-14 PROCEDURE — 85025 COMPLETE CBC W/AUTO DIFF WBC: CPT

## 2024-01-14 PROCEDURE — 84481 FREE ASSAY (FT-3): CPT

## 2024-01-14 PROCEDURE — 6360000002 HC RX W HCPCS: Performed by: INTERNAL MEDICINE

## 2024-01-14 PROCEDURE — 80048 BASIC METABOLIC PNL TOTAL CA: CPT

## 2024-01-14 PROCEDURE — 36415 COLL VENOUS BLD VENIPUNCTURE: CPT

## 2024-01-14 PROCEDURE — 1100000000 HC RM PRIVATE

## 2024-01-14 RX ORDER — GLUCAGON 1 MG/ML
1 KIT INJECTION PRN
Status: DISCONTINUED | OUTPATIENT
Start: 2024-01-14 | End: 2024-01-19 | Stop reason: HOSPADM

## 2024-01-14 RX ORDER — INSULIN LISPRO 100 [IU]/ML
0-4 INJECTION, SOLUTION INTRAVENOUS; SUBCUTANEOUS NIGHTLY
Status: DISCONTINUED | OUTPATIENT
Start: 2024-01-14 | End: 2024-01-19 | Stop reason: HOSPADM

## 2024-01-14 RX ORDER — DEXTROSE MONOHYDRATE 100 MG/ML
INJECTION, SOLUTION INTRAVENOUS CONTINUOUS PRN
Status: DISCONTINUED | OUTPATIENT
Start: 2024-01-14 | End: 2024-01-19 | Stop reason: HOSPADM

## 2024-01-14 RX ORDER — INSULIN LISPRO 100 [IU]/ML
0-8 INJECTION, SOLUTION INTRAVENOUS; SUBCUTANEOUS
Status: DISCONTINUED | OUTPATIENT
Start: 2024-01-14 | End: 2024-01-19 | Stop reason: HOSPADM

## 2024-01-14 RX ADMIN — SODIUM CHLORIDE, PRESERVATIVE FREE 10 ML: 5 INJECTION INTRAVENOUS at 13:21

## 2024-01-14 RX ADMIN — DEXAMETHASONE SODIUM PHOSPHATE 10 MG: 10 INJECTION, SOLUTION INTRAMUSCULAR; INTRAVENOUS at 20:33

## 2024-01-14 RX ADMIN — HYDROCHLOROTHIAZIDE 25 MG: 25 TABLET ORAL at 08:17

## 2024-01-14 RX ADMIN — ENOXAPARIN SODIUM 40 MG: 100 INJECTION SUBCUTANEOUS at 08:17

## 2024-01-14 RX ADMIN — INSULIN LISPRO 6 UNITS: 100 INJECTION, SOLUTION INTRAVENOUS; SUBCUTANEOUS at 16:52

## 2024-01-14 RX ADMIN — DEXAMETHASONE SODIUM PHOSPHATE 10 MG: 10 INJECTION, SOLUTION INTRAMUSCULAR; INTRAVENOUS at 13:16

## 2024-01-14 RX ADMIN — AMLODIPINE BESYLATE 10 MG: 5 TABLET ORAL at 08:17

## 2024-01-14 RX ADMIN — INSULIN LISPRO 4 UNITS: 100 INJECTION, SOLUTION INTRAVENOUS; SUBCUTANEOUS at 13:17

## 2024-01-14 RX ADMIN — SODIUM CHLORIDE, PRESERVATIVE FREE 10 ML: 5 INJECTION INTRAVENOUS at 20:34

## 2024-01-14 RX ADMIN — DEXAMETHASONE SODIUM PHOSPHATE 10 MG: 10 INJECTION, SOLUTION INTRAMUSCULAR; INTRAVENOUS at 04:35

## 2024-01-14 NOTE — PROGRESS NOTES
Hematology Oncology Progress Note           Follow up for: new CNS mets in setting of small cell lung ca  Chart notes reviewed since last visit.    Remains very alert/chatty  Speech mildly confused  No new complaints      Patient Vitals for the past 24 hrs:   BP Temp Temp src Pulse Resp SpO2   01/14/24 0729 (!) 145/90 97.9 °F (36.6 °C) Oral 78 20 92 %   01/14/24 0445 135/66 -- -- 67 -- --   01/14/24 0444 (!) 118/107 97.7 °F (36.5 °C) -- 70 20 92 %   01/13/24 1912 139/75 97.9 °F (36.6 °C) Oral 72 20 91 %   01/13/24 1533 (!) 151/77 97.9 °F (36.6 °C) Oral 92 18 95 %       Review of Systems:    Unable to reliably obtain      Physical Examination:  Constitutional confused   Head Normocephalic; no scars   Eyes Conjunctivae and sclerae are clear and without icterus. Pupils are reactive and equal.   ENMT Sinuses are nontender.  No oral exudates, ulcers, masses, thrush or mucositis. Oropharynx clear.  Tongue normal.   Neck Supple without masses or thyromegaly. No jugular venous distension.   Hematologic/Lymphatic No petechiae or purpura.  No tender or palpable lymph nodes in the cervical, supraclavicular, axillary or inguinal area.   Respiratory Lungs are clear to auscultation without rhonchi or wheezing.   Cardiovascular Regular rate and rhythm of heart without murmurs, gallops or rubs.   Chest / Line Site Chest is symmetric with no chest wall deformities.   Abdomen Non-tender, non-distended, no masses, ascites or hepatosplenomegaly. Good bowel sounds. No guarding or rebound tenderness. No pulsatile masses.   Musculoskeletal No tenderness or swelling, normal range of motion without obvious weakness.   Extremities No visible deformities, no cyanosis, clubbing or edema.    Skin No rashes, scars, or lesions suggestive of malignancy. No petechiae, purpura, or ecchymoses. No excoriations.    Neurologic No sensory or motor deficits, normal cerebellar function, normal gait, cranial nerves intact.   Psychiatric Alert and very  conversant       Labs:  Recent Results (from the past 24 hour(s))   POCT Glucose    Collection Time: 01/13/24  4:04 PM   Result Value Ref Range    POC Glucose 360 (H) 65 - 100 mg/dL    Performed by: Hong Argueta    Basic Metabolic Panel w/ Reflex to MG    Collection Time: 01/14/24  6:03 AM   Result Value Ref Range    Sodium 136 136 - 145 mmol/L    Potassium 3.6 3.5 - 5.1 mmol/L    Chloride 103 97 - 108 mmol/L    CO2 26 21 - 32 mmol/L    Anion Gap 7 5 - 15 mmol/L    Glucose 275 (H) 65 - 100 mg/dL    BUN 17 6 - 20 mg/dL    Creatinine 0.94 0.55 - 1.02 mg/dL    Bun/Cre Ratio 18 12 - 20      Est, Glom Filt Rate >60 >60 ml/min/1.73m2    Calcium 9.9 8.5 - 10.1 mg/dL   CBC with Auto Differential    Collection Time: 01/14/24  6:03 AM   Result Value Ref Range    WBC 8.3 3.6 - 11.0 K/uL    RBC 4.53 3.80 - 5.20 M/uL    Hemoglobin 13.0 11.5 - 16.0 g/dL    Hematocrit 39.2 35.0 - 47.0 %    MCV 86.5 80.0 - 99.0 FL    MCH 28.7 26.0 - 34.0 PG    MCHC 33.2 30.0 - 36.5 g/dL    RDW 15.4 (H) 11.5 - 14.5 %    Platelets 292 150 - 400 K/uL    MPV 9.8 8.9 - 12.9 FL    Nucleated RBCs 0.0 0.0  WBC    nRBC 0.00 0.00 - 0.01 K/uL    Neutrophils % 83 (H) 32 - 75 %    Lymphocytes % 10 (L) 12 - 49 %    Monocytes % 7 5 - 13 %    Eosinophils % 0 0 - 7 %    Basophils % 0 0 - 1 %    Immature Granulocytes 0 0 - 0.5 %    Neutrophils Absolute 6.9 1.8 - 8.0 K/UL    Lymphocytes Absolute 0.8 0.8 - 3.5 K/UL    Monocytes Absolute 0.6 0.0 - 1.0 K/UL    Eosinophils Absolute 0.0 0.0 - 0.4 K/UL    Basophils Absolute 0.0 0.0 - 0.1 K/UL    Absolute Immature Granulocyte 0.0 0.00 - 0.04 K/UL    Differential Type AUTOMATED     POCT Glucose    Collection Time: 01/14/24  7:31 AM   Result Value Ref Range    POC Glucose 297 (H) 65 - 100 mg/dL    Performed by: Harmeet Hickey (Float [place])    POCT Glucose    Collection Time: 01/14/24 11:33 AM   Result Value Ref Range    POC Glucose 280 (H) 65 - 100 mg/dL    Performed by: Davide Ruiz RN

## 2024-01-14 NOTE — CARE COORDINATION
01/14/24 1435   Service Assessment   Patient Orientation Other (see comment)  (CM telephoned patient's daughter, Елена Cortez)   Cognition Alert   History Provided By Child/Family   Primary Caregiver Self   Support Systems Children   Patient's Healthcare Decision Maker is: Legal Next of Kin   PCP Verified by CM Yes   Last Visit to PCP Within last 3 months   Prior Functional Level Independent in ADLs/IADLs   Current Functional Level Independent in ADLs/IADLs   Can patient return to prior living arrangement Yes   Ability to make needs known: Fair   Family able to assist with home care needs: Yes   Would you like for me to discuss the discharge plan with any other family members/significant others, and if so, who? Yes  (Daughter, Елена)   Financial Resources Medicaid   Community Resources None   Social/Functional History   Lives With Daughter   Type of Home House   Home Layout Two level  (4-5 steps to the entrance)   Home Equipment None   Services At/After Discharge   Transition of Care Consult (CM Consult) Discharge Planning   Confirm Follow Up Transport Family     1015: CM met with patient to complete DCP. Patient unable to answer questions and consented to CM calling her daughter.    1435: CM telephoned patient's daughterЕлена to complete DCP. Demos updated in system per daughter as [address] [facility] [id number]. Per Ms. Cortez, patient lives with her in two story home with 4-5 steps to the entrance. Prior to admission, she states patient as being independent with ADLS. DME: oxygen PRN - provider unknown at time of call. No previous HH/IRF/SNF. Preferred pharmacy verified as [facility]. When medically stable for discharge, Ms. Cortez states she will provide transportation for her mother. CM will continue to follow patient and recs of medical team.    Current Dispo: Home with Daughter    Advance Care Planning     General Advance Care Planning (ACP) Conversation    Date of Conversation:

## 2024-01-14 NOTE — PROGRESS NOTES
Hospitalist Progress Note    NAME:   Patience Porras   : 1960   MRN: [id number]     Date/Time: 2024 11:22 AM  Patient PCP: No primary care provider on file.    Estimated discharge date: 24 hours  Barriers: Discontinue sitter this morning in order for patient to discharge in AM      HOSPITAL COURSE:    Patience Porras is a 63 y.o. female with COPD, diabetes, hypertension and lung cancer who presented to the ED with chief complaint of altered mental status. No additional history obtained from patient due to poor mentation. No family at bedside. Reportedly EMS, called for constipation. However she did not report constipation or abdominal pain while in the ED.      In the ED, noted hypertensive. Lab work grossly within normal limits. CT head showed multiple brain masses concerning for metastasis, no acute hemorrhage. Patient admitted for further management. Oncology consult. Patient status post gamma knife currently on immunotherapy for maintenance. She is active with Neurosurgery at [facility] for CNS mets. Started on IV steroids given new altered mental status. Per oncology, will need MRI and XRT to plan next gamma knife.     Assessment / Plan:    1. Metastatic cancer to brain  - Consult oncology  - Given metastasis to brain, very poor prognosis  - Status post gamma knife currently on immunotherapy for maintenance  - IV decadron  - MRI brain pending   - Spoke to daughter, Елена Cortez, about result findings and overall prognosis. She will be reaching out to family members.      2. Altered mental status  - Likely related to brain metastasis   - However unable to rule out underlying psychiatric disorder. IM haldol PRN for now.   - Advised nurse to discontinue sitter this morning      3. Diabetes  - Uncertain home medications  - POC BS + sliding scale insulin  - HbA1c      4. Hypertension   - Continue home medications.    - IV labetalol PRN      5. COPD  - Not in exacerbation    6. Low TSH  - T3, T4    ________________________________________________________________________  Noe Patel PA-C     Procedures: see electronic medical records for all procedures/Xrays and details which were not copied into this note but were reviewed prior to creation of Plan.      LABS:  I reviewed today's most current labs and imaging studies.  Pertinent labs include:  Recent Labs     01/12/24 2028 01/13/24  0641 01/14/24  0603   WBC 3.8 3.2* 8.3   HGB 14.1 13.5 13.0   HCT 43.4 41.5 39.2    280 292       Recent Labs     01/12/24 2028 01/13/24  0641 01/14/24  0603    138 136   K 3.6 3.5 3.6    107 103   CO2 28 24 26   BUN 11 16 17   ALT 23  --   --          Signed: Noe Patel PA-C

## 2024-01-15 ENCOUNTER — HOSPITAL ENCOUNTER (OUTPATIENT)
Facility: HOSPITAL | Age: 64
Discharge: HOME | End: 2024-01-18

## 2024-01-15 ENCOUNTER — APPOINTMENT (OUTPATIENT)
Facility: HOSPITAL | Age: 64
End: 2024-01-15
Payer: MEDICAID

## 2024-01-15 LAB
ANION GAP SERPL CALC-SCNC: 7 MMOL/L (ref 5–15)
BASOPHILS # BLD: 0 K/UL (ref 0–0.1)
BASOPHILS NFR BLD: 0 % (ref 0–1)
BUN SERPL-MCNC: 20 MG/DL (ref 6–20)
BUN/CREAT SERPL: 21 (ref 12–20)
CA-I BLD-MCNC: 9.6 MG/DL (ref 8.5–10.1)
CHLORIDE SERPL-SCNC: 101 MMOL/L (ref 97–108)
CO2 SERPL-SCNC: 27 MMOL/L (ref 21–32)
CREAT SERPL-MCNC: 0.96 MG/DL (ref 0.55–1.02)
DIFFERENTIAL METHOD BLD: ABNORMAL
EOSINOPHIL # BLD: 0 K/UL (ref 0–0.4)
EOSINOPHIL NFR BLD: 0 % (ref 0–7)
ERYTHROCYTE [DISTWIDTH] IN BLOOD BY AUTOMATED COUNT: 15.1 % (ref 11.5–14.5)
GLUCOSE BLD STRIP.AUTO-MCNC: 327 MG/DL (ref 65–100)
GLUCOSE BLD STRIP.AUTO-MCNC: 329 MG/DL (ref 65–100)
GLUCOSE BLD STRIP.AUTO-MCNC: 349 MG/DL (ref 65–100)
GLUCOSE SERPL-MCNC: 355 MG/DL (ref 65–100)
HCT VFR BLD AUTO: 40.3 % (ref 35–47)
HGB BLD-MCNC: 13.2 G/DL (ref 11.5–16)
IMM GRANULOCYTES # BLD AUTO: 0.1 K/UL (ref 0–0.04)
IMM GRANULOCYTES NFR BLD AUTO: 1 % (ref 0–0.5)
LYMPHOCYTES # BLD: 0.8 K/UL (ref 0.8–3.5)
LYMPHOCYTES NFR BLD: 7 % (ref 12–49)
MCH RBC QN AUTO: 28.6 PG (ref 26–34)
MCHC RBC AUTO-ENTMCNC: 32.8 G/DL (ref 30–36.5)
MCV RBC AUTO: 87.4 FL (ref 80–99)
MONOCYTES # BLD: 0.4 K/UL (ref 0–1)
MONOCYTES NFR BLD: 4 % (ref 5–13)
NEUTS SEG # BLD: 9.3 K/UL (ref 1.8–8)
NEUTS SEG NFR BLD: 88 % (ref 32–75)
NRBC # BLD: 0 K/UL (ref 0–0.01)
NRBC BLD-RTO: 0 PER 100 WBC
PERFORMED BY:: ABNORMAL
PLATELET # BLD AUTO: 283 K/UL (ref 150–400)
PMV BLD AUTO: 9.6 FL (ref 8.9–12.9)
POTASSIUM SERPL-SCNC: 3.5 MMOL/L (ref 3.5–5.1)
RBC # BLD AUTO: 4.61 M/UL (ref 3.8–5.2)
SODIUM SERPL-SCNC: 135 MMOL/L (ref 136–145)
WBC # BLD AUTO: 10.6 K/UL (ref 3.6–11)

## 2024-01-15 PROCEDURE — 80048 BASIC METABOLIC PNL TOTAL CA: CPT

## 2024-01-15 PROCEDURE — 85025 COMPLETE CBC W/AUTO DIFF WBC: CPT

## 2024-01-15 PROCEDURE — 6360000002 HC RX W HCPCS: Performed by: INTERNAL MEDICINE

## 2024-01-15 PROCEDURE — 82962 GLUCOSE BLOOD TEST: CPT

## 2024-01-15 PROCEDURE — 6370000000 HC RX 637 (ALT 250 FOR IP): Performed by: STUDENT IN AN ORGANIZED HEALTH CARE EDUCATION/TRAINING PROGRAM

## 2024-01-15 PROCEDURE — 70553 MRI BRAIN STEM W/O & W/DYE: CPT

## 2024-01-15 PROCEDURE — 6370000000 HC RX 637 (ALT 250 FOR IP): Performed by: INTERNAL MEDICINE

## 2024-01-15 PROCEDURE — 1100000000 HC RM PRIVATE

## 2024-01-15 PROCEDURE — 2580000003 HC RX 258: Performed by: INTERNAL MEDICINE

## 2024-01-15 PROCEDURE — 36415 COLL VENOUS BLD VENIPUNCTURE: CPT

## 2024-01-15 PROCEDURE — 6360000004 HC RX CONTRAST MEDICATION: Performed by: INTERNAL MEDICINE

## 2024-01-15 PROCEDURE — A9577 INJ MULTIHANCE: HCPCS | Performed by: INTERNAL MEDICINE

## 2024-01-15 RX ADMIN — DEXAMETHASONE SODIUM PHOSPHATE 10 MG: 10 INJECTION, SOLUTION INTRAMUSCULAR; INTRAVENOUS at 03:56

## 2024-01-15 RX ADMIN — GADOBENATE DIMEGLUMINE 18 ML: 529 INJECTION, SOLUTION INTRAVENOUS at 13:05

## 2024-01-15 RX ADMIN — HALOPERIDOL LACTATE 2 MG: 5 INJECTION, SOLUTION INTRAMUSCULAR at 17:46

## 2024-01-15 RX ADMIN — INSULIN LISPRO 6 UNITS: 100 INJECTION, SOLUTION INTRAVENOUS; SUBCUTANEOUS at 12:03

## 2024-01-15 RX ADMIN — ENOXAPARIN SODIUM 40 MG: 100 INJECTION SUBCUTANEOUS at 08:31

## 2024-01-15 RX ADMIN — INSULIN LISPRO 6 UNITS: 100 INJECTION, SOLUTION INTRAVENOUS; SUBCUTANEOUS at 08:30

## 2024-01-15 RX ADMIN — DEXAMETHASONE SODIUM PHOSPHATE 10 MG: 10 INJECTION, SOLUTION INTRAMUSCULAR; INTRAVENOUS at 08:30

## 2024-01-15 RX ADMIN — AMLODIPINE BESYLATE 10 MG: 5 TABLET ORAL at 08:30

## 2024-01-15 RX ADMIN — HYDROCHLOROTHIAZIDE 25 MG: 25 TABLET ORAL at 08:30

## 2024-01-15 RX ADMIN — INSULIN LISPRO 6 UNITS: 100 INJECTION, SOLUTION INTRAVENOUS; SUBCUTANEOUS at 17:12

## 2024-01-15 RX ADMIN — DEXAMETHASONE SODIUM PHOSPHATE 10 MG: 10 INJECTION, SOLUTION INTRAMUSCULAR; INTRAVENOUS at 15:53

## 2024-01-15 RX ADMIN — SODIUM CHLORIDE, PRESERVATIVE FREE 10 ML: 5 INJECTION INTRAVENOUS at 08:31

## 2024-01-15 ASSESSMENT — ENCOUNTER SYMPTOMS
NAUSEA: 0
DIARRHEA: 0
CONSTIPATION: 0
WHEEZING: 0
COUGH: 0
SHORTNESS OF BREATH: 0
BACK PAIN: 0
ABDOMINAL PAIN: 0

## 2024-01-15 NOTE — PROGRESS NOTES
Hospitalist Progress Note    NAME:   Patience Porras   : 1960   MRN: [id number]     Date/Time: 1/15/2024 6:49 PM  Patient PCP: No primary care provider on file.    Estimated discharge date: 24 hours  Barriers: oncology clearance, goals of care decisions      HOSPITAL COURSE:    Patience Porras is a 63 y.o. female with COPD, diabetes, hypertension and lung cancer who presented to the ED with chief complaint of altered mental status. No additional history obtained from patient due to poor mentation. No family at bedside. Reportedly EMS, called for constipation. However she did not report constipation or abdominal pain while in the ED.      CT head showed multiple brain masses concerning for metastasis, no acute hemorrhage. Patient admitted for further management. Oncology consult. Patient status post gamma knife currently on immunotherapy for maintenance. She is active with Neurosurgery at [facility] for CNS mets. Started on IV steroids given new altered mental status. Per oncology, will need MRI and XRT to plan next gamma knife. MRI of brain shows multiple peripheral enhancement intracranial metastasis measuring up to 3.4 x 2.7 cm in the anterior right frontal lobe and 2.2 x 2.5 cm in the left temporal lobe.  Associated with moderate to large vasogenic edema and mass effect with up to 3 mm left to right midline brain shift.  No infarct or intracranial hemorrhage    Assessment / Plan:  Metastatic cancer to brain  - Given metastasis to brain, very poor prognosis  - MRI of brain shows multiple peripheral enhancement intracranial metastasis measuring up to 3.4 x 2.7 cm in the anterior right frontal lobe and 2.2 x 2.5 cm in the left temporal lobe.  Associated with moderate to large vasogenic edema and mass effect with up to 3 mm left to right midline brain shift.  No infarct or intracranial hemorrhage.  - Status post gamma knife currently on immunotherapy for maintenance  - IV decadron  - Spoke to daughter, Channel

## 2024-01-15 NOTE — PROGRESS NOTES
RN and patient advocate present when patient received cell phone from security with kick stand case without any credit cards attached to outside of case.

## 2024-01-15 NOTE — PROGRESS NOTES
At 2330 Noticed patient's blood pressure have been high, attempted to repeat blood pressure to see if we can administer labetalol PRN but patient is refusing to get pressures done. Attempted to repeat pressure again at this time but patient is still refusing

## 2024-01-15 NOTE — PROGRESS NOTES
Patient lives at home with her daughter and has home oxygen at home she uses prn. Patient scheduled for MRI today and will return home with her daughters house when cleared by oncology and medicine.     Patient remains on 1:1 for safety.

## 2024-01-15 NOTE — CONSULTS
[place] [place]    Radiation Oncology Consultation    Patience Porras  [id number]  1960     Diagnosis   1.     ICD-10-CM    1. Altered mental status, unspecified altered mental status type  R41.82       2. Metastasis to brain (HCC)  C79.31           AJCC Staging has been reviewed.  History of Present Illness   Ms. Porras is a 63 y.o. female seen in consultation at the request of Dr. Patel ref. provider found to assess the role of radiation for her above diagnosis.    her oncologic history is detailed below:  Pleasant 62 yo with known hx of metastatic small cell lung ca. Treatment included 2 episodes of gamma knife for CNS mets.  Initially in 1/2023 for 2 sites in L frontal lobe, then second treatment in 5/2023 for 12 additional sites of cns mets.   Treated with  thoracic radiotherapy here at [facility] in 5/2023 with consolidative xrt post-chemo.    Doing well, but now admitted for mental status changes, CT scan head shows multiple CNS lesions. Started steroids. MRI brain today.    Ask to see patient to discuss radiation options.    Symptomatically, neuro intact. No cns complaints.   From a performance status standpoint, she is able to ecog 2. Ms. Porras denies a history of inflammatory bowel disease, scleroderma or other collagen vascular diseases, pacemaker/AICD.  Prior gamma knife and thoracic radiation as above    Review of Systems:  A complete review of systems was obtained and negative except as described above.    Allergies and Medications     Allergies   Allergen Reactions    Lisinopril      Other reaction(s): Unknown (comments)    Seroquel [Quetiapine] Palpitations       Current Outpatient Medications   Medication Instructions    albuterol sulfate HFA (PROVENTIL;VENTOLIN;PROAIR) 108 (90 Base) MCG/ACT inhaler 2 puffs, Inhalation, EVERY 4 HOURS PRN    amLODIPine (NORVASC) 10 mg, Oral, DAILY    amoxicillin-clavulanate (AUGMENTIN) 875-125 MG per tablet 1 tablet, Oral, 2 TIMES DAILY

## 2024-01-15 NOTE — PROGRESS NOTES
Hematology/Oncology   Progress Note    Patient: Patience Porras MRN: [id number]     YOB: 1960  Age: 63 y.o.  Sex: female      Admit Date: 1/12/2024    LOS: 3 days     Chief Complaint: mental status change  HemOnc: lung cancer with brain metastasis    Subjective:   Remains very alert/chatty, Speech mildly confused  No new complaints, family present at beside including daughter     ROS: unable to reliably obtain due to AMS    Current Facility-Administered Medications:     insulin lispro (HUMALOG) injection vial 0-8 Units, 0-8 Units, SubCUTAneous, TID WC, Noe Patel PA-C, 6 Units at 01/15/24 0830    insulin lispro (HUMALOG) injection vial 0-4 Units, 0-4 Units, SubCUTAneous, Nightly, Noe Patel PA-C    glucose chewable tablet 16 g, 4 tablet, Oral, PRN, Noe Patel PA-C    dextrose bolus 10% 125 mL, 125 mL, IntraVENous, PRN **OR** dextrose bolus 10% 250 mL, 250 mL, IntraVENous, PRN, Noe Patel PA-C    glucagon injection 1 mg, 1 mg, SubCUTAneous, PRN, Noe Patel PA-C    dextrose 10 % infusion, , IntraVENous, Continuous PRN, Noe Patel PA-C    labetalol (NORMODYNE;TRANDATE) injection 10 mg, 10 mg, IntraVENous, Q4H PRN, Mitchell Worthington MD, 10 mg at 01/13/24 0542    sodium chloride flush 0.9 % injection 5-40 mL, 5-40 mL, IntraVENous, 2 times per day, Mitchell Worthington MD, 10 mL at 01/15/24 0831    sodium chloride flush 0.9 % injection 5-40 mL, 5-40 mL, IntraVENous, PRN, Mitchell Worthington MD, 10 mL at 01/13/24 2223    0.9 % sodium chloride infusion, , IntraVENous, PRN, Mitchell Worthington MD    potassium chloride (KLOR-CON M) extended release tablet 40 mEq, 40 mEq, Oral, PRN **OR** potassium bicarb-citric acid (EFFER-K) effervescent tablet 40 mEq, 40 mEq, Oral, PRN **OR** potassium chloride 10 mEq/100 mL IVPB (Peripheral Line), 10 mEq, IntraVENous, PRN, Mitchell Worthington MD    magnesium sulfate 2000 mg in 50 mL IVPB premix, 2,000 mg, IntraVENous, PRN, Mitchell Worthington,  MD    enoxaparin (LOVENOX) injection 40 mg, 40 mg, SubCUTAneous, Daily, Mitchell Worthington MD, 40 mg at 01/15/24 0831    ondansetron (ZOFRAN-ODT) disintegrating tablet 4 mg, 4 mg, Oral, Q8H PRN **OR** ondansetron (ZOFRAN) injection 4 mg, 4 mg, IntraVENous, Q6H PRN, Mitchell Worthington MD    polyethylene glycol (GLYCOLAX) packet 17 g, 17 g, Oral, Daily PRN, Mitchell Worthington MD    acetaminophen (TYLENOL) tablet 650 mg, 650 mg, Oral, Q6H PRN **OR** acetaminophen (TYLENOL) suppository 650 mg, 650 mg, Rectal, Q6H PRN, Mitchell Worthington MD    haloperidol lactate (HALDOL) injection 5 mg, 5 mg, IntraMUSCular, Once, Mitchell Worthington MD    melatonin tablet 5 mg, 5 mg, Oral, Nightly, Mitchell Worthington MD, 5 mg at 01/13/24 2032    haloperidol lactate (HALDOL) injection 2 mg, 2 mg, IntraMUSCular, Q6H PRN, Mitchell Worthington MD    amLODIPine (NORVASC) tablet 10 mg, 10 mg, Oral, Daily, Mitchell Worthington MD, 10 mg at 01/15/24 0830    hydroCHLOROthiazide (HYDRODIURIL) tablet 25 mg, 25 mg, Oral, Daily, Mitchell Worthington MD, 25 mg at 01/15/24 0830    ipratropium 0.5 mg-albuterol 2.5 mg (DUONEB) nebulizer solution 1 Dose, 1 Dose, Inhalation, Q4H PRN, Mitchell Worthington MD    dexAMETHasone (PF) (DECADRON) injection 10 mg, 10 mg, IntraVENous, Q6H, Mitchell Worthington MD, 10 mg at 01/15/24 0830     Objective:     Vitals:    01/14/24 1931 01/14/24 2328 01/15/24 0557 01/15/24 0805   BP: (!) 146/74 (!) 153/94 127/75 (!) 173/86   Pulse: 67 69 61 61   Resp: 15 16 15 18   Temp: 98.4 °F (36.9 °C) 98.4 °F (36.9 °C) 97.5 °F (36.4 °C) 97.3 °F (36.3 °C)   TempSrc: Oral Oral Oral Oral   SpO2: 95% 93% 95% 91%   Weight:       Height:          Physical Exam:   Constitutional Ill-appearing, confused   Head Normocephalic; no scars   Eyes Conjunctivae and sclerae are clear and without icterus.    ENMT No oral exudates, ulcers, masses, thrush or mucositis.    Neck Supple without masses or thyromegaly. No jugular venous distension.   Hematologic/Lymphatic No petechiae or purpura.  No tender or

## 2024-01-16 LAB
ANION GAP SERPL CALC-SCNC: 7 MMOL/L (ref 5–15)
BASOPHILS # BLD: 0 K/UL (ref 0–0.1)
BASOPHILS NFR BLD: 0 % (ref 0–1)
BUN SERPL-MCNC: 23 MG/DL (ref 6–20)
BUN/CREAT SERPL: 24 (ref 12–20)
CA-I BLD-MCNC: 9.6 MG/DL (ref 8.5–10.1)
CHLORIDE SERPL-SCNC: 99 MMOL/L (ref 97–108)
CO2 SERPL-SCNC: 28 MMOL/L (ref 21–32)
CREAT SERPL-MCNC: 0.94 MG/DL (ref 0.55–1.02)
DIFFERENTIAL METHOD BLD: ABNORMAL
EOSINOPHIL # BLD: 0 K/UL (ref 0–0.4)
EOSINOPHIL NFR BLD: 0 % (ref 0–7)
ERYTHROCYTE [DISTWIDTH] IN BLOOD BY AUTOMATED COUNT: 15 % (ref 11.5–14.5)
GLUCOSE BLD STRIP.AUTO-MCNC: 179 MG/DL (ref 65–100)
GLUCOSE BLD STRIP.AUTO-MCNC: 201 MG/DL (ref 65–100)
GLUCOSE BLD STRIP.AUTO-MCNC: 280 MG/DL (ref 65–100)
GLUCOSE SERPL-MCNC: 292 MG/DL (ref 65–100)
HCT VFR BLD AUTO: 40.7 % (ref 35–47)
HGB BLD-MCNC: 13.8 G/DL (ref 11.5–16)
IMM GRANULOCYTES # BLD AUTO: 0.1 K/UL (ref 0–0.04)
IMM GRANULOCYTES NFR BLD AUTO: 1 % (ref 0–0.5)
LYMPHOCYTES # BLD: 1.3 K/UL (ref 0.8–3.5)
LYMPHOCYTES NFR BLD: 12 % (ref 12–49)
MCH RBC QN AUTO: 29.1 PG (ref 26–34)
MCHC RBC AUTO-ENTMCNC: 33.9 G/DL (ref 30–36.5)
MCV RBC AUTO: 85.9 FL (ref 80–99)
MONOCYTES # BLD: 0.7 K/UL (ref 0–1)
MONOCYTES NFR BLD: 7 % (ref 5–13)
NEUTS SEG # BLD: 8.3 K/UL (ref 1.8–8)
NEUTS SEG NFR BLD: 80 % (ref 32–75)
NRBC # BLD: 0 K/UL (ref 0–0.01)
NRBC BLD-RTO: 0 PER 100 WBC
PERFORMED BY:: ABNORMAL
PLATELET # BLD AUTO: 303 K/UL (ref 150–400)
PMV BLD AUTO: 9.6 FL (ref 8.9–12.9)
POTASSIUM SERPL-SCNC: 3.5 MMOL/L (ref 3.5–5.1)
RBC # BLD AUTO: 4.74 M/UL (ref 3.8–5.2)
SODIUM SERPL-SCNC: 134 MMOL/L (ref 136–145)
WBC # BLD AUTO: 10.4 K/UL (ref 3.6–11)

## 2024-01-16 PROCEDURE — 6360000002 HC RX W HCPCS: Performed by: INTERNAL MEDICINE

## 2024-01-16 PROCEDURE — 6370000000 HC RX 637 (ALT 250 FOR IP): Performed by: STUDENT IN AN ORGANIZED HEALTH CARE EDUCATION/TRAINING PROGRAM

## 2024-01-16 PROCEDURE — 80048 BASIC METABOLIC PNL TOTAL CA: CPT

## 2024-01-16 PROCEDURE — 6370000000 HC RX 637 (ALT 250 FOR IP)

## 2024-01-16 PROCEDURE — 2580000003 HC RX 258: Performed by: INTERNAL MEDICINE

## 2024-01-16 PROCEDURE — 36415 COLL VENOUS BLD VENIPUNCTURE: CPT

## 2024-01-16 PROCEDURE — 6370000000 HC RX 637 (ALT 250 FOR IP): Performed by: INTERNAL MEDICINE

## 2024-01-16 PROCEDURE — 82962 GLUCOSE BLOOD TEST: CPT

## 2024-01-16 PROCEDURE — 85025 COMPLETE CBC W/AUTO DIFF WBC: CPT

## 2024-01-16 PROCEDURE — 1100000000 HC RM PRIVATE

## 2024-01-16 RX ORDER — INSULIN GLARGINE 100 [IU]/ML
10 INJECTION, SOLUTION SUBCUTANEOUS DAILY
Status: DISCONTINUED | OUTPATIENT
Start: 2024-01-16 | End: 2024-01-18

## 2024-01-16 RX ORDER — DEXAMETHASONE SODIUM PHOSPHATE 4 MG/ML
4 INJECTION, SOLUTION INTRA-ARTICULAR; INTRALESIONAL; INTRAMUSCULAR; INTRAVENOUS; SOFT TISSUE EVERY 6 HOURS
Status: DISCONTINUED | OUTPATIENT
Start: 2024-01-16 | End: 2024-01-19 | Stop reason: HOSPADM

## 2024-01-16 RX ORDER — BENZONATATE 100 MG/1
100 CAPSULE ORAL 3 TIMES DAILY PRN
Status: DISCONTINUED | OUTPATIENT
Start: 2024-01-16 | End: 2024-01-19 | Stop reason: HOSPADM

## 2024-01-16 RX ORDER — GUAIFENESIN 600 MG/1
600 TABLET, EXTENDED RELEASE ORAL 2 TIMES DAILY
Status: DISCONTINUED | OUTPATIENT
Start: 2024-01-16 | End: 2024-01-19 | Stop reason: HOSPADM

## 2024-01-16 RX ADMIN — GUAIFENESIN 600 MG: 600 TABLET, EXTENDED RELEASE ORAL at 20:05

## 2024-01-16 RX ADMIN — INSULIN LISPRO 4 UNITS: 100 INJECTION, SOLUTION INTRAVENOUS; SUBCUTANEOUS at 09:47

## 2024-01-16 RX ADMIN — DEXAMETHASONE SODIUM PHOSPHATE 4 MG: 4 INJECTION INTRA-ARTICULAR; INTRALESIONAL; INTRAMUSCULAR; INTRAVENOUS; SOFT TISSUE at 18:11

## 2024-01-16 RX ADMIN — HYDROCHLOROTHIAZIDE 25 MG: 25 TABLET ORAL at 09:48

## 2024-01-16 RX ADMIN — BENZONATATE 100 MG: 100 CAPSULE ORAL at 09:53

## 2024-01-16 RX ADMIN — SODIUM CHLORIDE, PRESERVATIVE FREE 10 ML: 5 INJECTION INTRAVENOUS at 20:27

## 2024-01-16 RX ADMIN — ENOXAPARIN SODIUM 40 MG: 100 INJECTION SUBCUTANEOUS at 09:40

## 2024-01-16 RX ADMIN — GUAIFENESIN 600 MG: 600 TABLET, EXTENDED RELEASE ORAL at 13:37

## 2024-01-16 RX ADMIN — DEXAMETHASONE SODIUM PHOSPHATE 4 MG: 4 INJECTION INTRA-ARTICULAR; INTRALESIONAL; INTRAMUSCULAR; INTRAVENOUS; SOFT TISSUE at 23:24

## 2024-01-16 RX ADMIN — SODIUM CHLORIDE, PRESERVATIVE FREE 10 ML: 5 INJECTION INTRAVENOUS at 09:55

## 2024-01-16 RX ADMIN — INSULIN LISPRO 0 UNITS: 100 INJECTION, SOLUTION INTRAVENOUS; SUBCUTANEOUS at 20:26

## 2024-01-16 RX ADMIN — LABETALOL HYDROCHLORIDE 10 MG: 5 INJECTION, SOLUTION INTRAVENOUS at 20:26

## 2024-01-16 RX ADMIN — INSULIN GLARGINE 10 UNITS: 100 INJECTION, SOLUTION SUBCUTANEOUS at 13:37

## 2024-01-16 RX ADMIN — INSULIN LISPRO 2 UNITS: 100 INJECTION, SOLUTION INTRAVENOUS; SUBCUTANEOUS at 13:37

## 2024-01-16 RX ADMIN — DEXAMETHASONE SODIUM PHOSPHATE 10 MG: 10 INJECTION, SOLUTION INTRAMUSCULAR; INTRAVENOUS at 09:39

## 2024-01-16 RX ADMIN — Medication 5 MG: at 20:05

## 2024-01-16 ASSESSMENT — ENCOUNTER SYMPTOMS
COUGH: 1
CONSTIPATION: 0
BACK PAIN: 0
SHORTNESS OF BREATH: 0
NAUSEA: 0
WHEEZING: 0
DIARRHEA: 0
ABDOMINAL PAIN: 0
SINUS PRESSURE: 1

## 2024-01-16 NOTE — PROGRESS NOTES
Hematology/Oncology   Progress Note    Patient: Patience Porras MRN: [id number]     YOB: 1960  Age: 63 y.o.  Sex: female      Admit Date: 1/12/2024    LOS: 4 days     Chief Complaint: mental status change  HemOnc: lung cancer with brain metastasis    Subjective:   Remains confused, still some difficulty finding words  No new complaints     ROS: unable to reliably obtain due to AMS    Current Facility-Administered Medications:     guaiFENesin (MUCINEX) extended release tablet 600 mg, 600 mg, Oral, BID, Brianna Gayle PA-C    insulin glargine (LANTUS) injection vial 10 Units, 10 Units, SubCUTAneous, Daily, Brianna Gayle PA-C    benzonatate (TESSALON) capsule 100 mg, 100 mg, Oral, TID PRN, Brianna Gayle PA-C    insulin lispro (HUMALOG) injection vial 0-8 Units, 0-8 Units, SubCUTAneous, TID WC, Noe Patel PA-C, 4 Units at 01/16/24 0947    insulin lispro (HUMALOG) injection vial 0-4 Units, 0-4 Units, SubCUTAneous, Nightly, Noe Patel PA-C    glucose chewable tablet 16 g, 4 tablet, Oral, PRN, Noe Patel PA-C    dextrose bolus 10% 125 mL, 125 mL, IntraVENous, PRN **OR** dextrose bolus 10% 250 mL, 250 mL, IntraVENous, PRN, oNe Patel PA-C    glucagon injection 1 mg, 1 mg, SubCUTAneous, PRN, Noe Patel PA-C    dextrose 10 % infusion, , IntraVENous, Continuous PRN, Noe Patel PA-C    labetalol (NORMODYNE;TRANDATE) injection 10 mg, 10 mg, IntraVENous, Q4H PRN, Mitchell Worthington MD, 10 mg at 01/13/24 0542    sodium chloride flush 0.9 % injection 5-40 mL, 5-40 mL, IntraVENous, 2 times per day, Mitchell Worthington MD, 10 mL at 01/15/24 0831    sodium chloride flush 0.9 % injection 5-40 mL, 5-40 mL, IntraVENous, PRN, Mitchell Worthington MD, 10 mL at 01/13/24 2223    0.9 % sodium chloride infusion, , IntraVENous, PRN, Elin, Mitchell Bedolla MD    potassium chloride (KLOR-CON M) extended release tablet 40 mEq, 40 mEq, Oral, PRN **OR** potassium bicarb-citric acid  WBRT  -Dose of Dex decreased to 4 mg Q6H     Altered mental status  - Likely related to brain metastasis   - However primary team unable to rule out underlying psychiatric disorder    COPD  - no sign of exacerbation    DM  -management per primary team    Signed By: TAPAN Sinclair - CNP     January 16, 2024            I have seen, examined and evaluated the patient with Elizabeth Russell NP under my direct supervision. I have reviewed the note and agree with the assessment and plan of care as documented.   HPI, social history, family history, ROS, physical examination and management plan have been reviewed and verified.    Vishnu Rosa MD.

## 2024-01-16 NOTE — PROGRESS NOTES
Hospitalist Progress Note    NAME:   Patience Porras   : 1960   MRN: [id number]     Date/Time: 2024 5:15 PM  Patient PCP: No primary care provider on file.    Estimated discharge date: 24 hours  Barriers: oncology clearance, goals of care decisions      HOSPITAL COURSE:    Patience Porras is a 63 y.o. female with COPD, diabetes, hypertension and lung cancer who presented to the ED with chief complaint of altered mental status. No additional history obtained from patient due to poor mentation. No family at bedside. Reportedly EMS, called for constipation. However she did not report constipation or abdominal pain while in the ED.      CT head showed multiple brain masses concerning for metastasis, no acute hemorrhage. Patient admitted for further management. Oncology consult. Patient status post gamma knife currently on immunotherapy for maintenance. She is active with Neurosurgery at [facility] for CNS mets. Started on IV steroids given new altered mental status. Per oncology, will need MRI and XRT to plan next gamma knife. MRI of brain shows multiple peripheral enhancement intracranial metastasis measuring up to 3.4 x 2.7 cm in the anterior right frontal lobe and 2.2 x 2.5 cm in the left temporal lobe.  Associated with moderate to large vasogenic edema and mass effect with up to 3 mm left to right midline brain shift.  No infarct or intracranial hemorrhage. Oncology and rad/onc waiting for patient mental status improvement to determine next steps.     Assessment / Plan:  Metastatic cancer to brain  - Given metastasis to brain, very poor prognosis  - MRI of brain shows multiple peripheral enhancement intracranial metastasis measuring up to 3.4 x 2.7 cm in the anterior right frontal lobe and 2.2 x 2.5 cm in the left temporal lobe.  Associated with moderate to large vasogenic edema and mass effect with up to 3 mm left to right midline brain shift.  No infarct or intracranial hemorrhage.  - Status post gamma  examined this patient on 1/16/2024, as outlined below:    Review of Systems   Constitutional:  Negative for chills and fever.   HENT:  Positive for sinus pressure.    Eyes:  Negative for visual disturbance.   Respiratory:  Positive for cough. Negative for shortness of breath and wheezing.    Cardiovascular:  Negative for chest pain, palpitations and leg swelling.   Gastrointestinal:  Negative for abdominal pain, constipation, diarrhea and nausea.   Genitourinary:  Negative for dysuria, flank pain and frequency.   Musculoskeletal:  Negative for arthralgias and back pain.   Skin:  Negative for rash and wound.   Neurological:  Negative for dizziness, seizures and headaches.   Psychiatric/Behavioral:  The patient is not nervous/anxious.         PHYSICAL EXAM:  Physical Exam  Constitutional:       General: She is not in acute distress.  HENT:      Head: Normocephalic and atraumatic.   Eyes:      Extraocular Movements: Extraocular movements intact.      Pupils: Pupils are equal, round, and reactive to light.   Cardiovascular:      Rate and Rhythm: Normal rate and regular rhythm.      Heart sounds: No murmur heard.     No gallop.   Pulmonary:      Effort: Pulmonary effort is normal.      Breath sounds: No wheezing, rhonchi or rales.   Abdominal:      General: Abdomen is flat. There is no distension.      Palpations: Abdomen is soft.      Tenderness: There is no abdominal tenderness.   Musculoskeletal:         General: Normal range of motion.      Right lower leg: No edema.      Left lower leg: No edema.   Skin:     General: Skin is warm and dry.      Findings: No rash.   Neurological:      Mental Status: She is alert. She is disoriented.      Cranial Nerves: No cranial nerve deficit.   Psychiatric:         Behavior: Behavior normal.          Reviewed most current lab test results and cultures  YES  Reviewed most current radiology test results   YES  Review and summation of old records today    NO  Reviewed patient's

## 2024-01-16 NOTE — PROGRESS NOTES
Patient strongly refused to check her vital signs, blood sugar, and to take her night medicines. Started to get furious and agitated while we keep insisting.    0600-- Attempted again to check the patient's vital signs, but the patient still refused.

## 2024-01-16 NOTE — PROGRESS NOTES
[facility]    Radiation Oncology Follow Up    Patience Porras  [id number]  1960     Diagnosis   No diagnosis found.metastatic small cell lung cancer, progressing CNS mets    AJCC Staging has been reviewed.  Oncologic History   Extensive stage small cell lung ca. Gamma knife x 2, consolidative thoracic radiation 5/2023, now with progressing CNS mets.     Interval History   Ms. Porras remains in-patient.   Seen yesterday in consult.   MRI brain yesterday reviewed. Progressive cns disease.  Asymptomatic on steroids.  No neurosurgical option.        Review of Systems:  A complete review of systems was obtained and negative except as described above.    Interval Imaging/Labs     Above imaging/lab reports were reviewed.  Allergies and Medications     Allergies   Allergen Reactions    Lisinopril      Other reaction(s): Unknown (comments)    Seroquel [Quetiapine] Palpitations       Current Outpatient Medications   Medication Instructions    albuterol sulfate HFA (PROVENTIL;VENTOLIN;PROAIR) 108 (90 Base) MCG/ACT inhaler 2 puffs, Inhalation, EVERY 4 HOURS PRN    amLODIPine (NORVASC) 10 mg, Oral, DAILY    amoxicillin-clavulanate (AUGMENTIN) 875-125 MG per tablet 1 tablet, Oral, 2 TIMES DAILY    hydroCHLOROthiazide (HYDRODIURIL) 25 mg, Oral, DAILY    ipratropium-albuterol (DUONEB) 0.5-2.5 (3) MG/3ML SOLN nebulizer solution 3 mLs, Inhalation, EVERY 6 HOURS PRN        Physical Exam:   Vitals: There were no vitals taken for this visit.   Performance Status: ECOG 2, Ambulatory/capable of all self-care, unable to perform any work activities. Up and about more than 50% of waking hours  Constitutional: Pleasant, sitting comfortably in no acute distress.   HEENT: Moist mucous membranes.  Cardiac: Good peripheral perfusion, no upper or lower extremity edema bilaterally.  Pulmonary: No increased work of breathing. Symmetric chest rise  Skin: Warm, dry, no rashes noted.  Neurologic:

## 2024-01-17 ENCOUNTER — HOSPITAL ENCOUNTER (OUTPATIENT)
Facility: HOSPITAL | Age: 64
Discharge: HOME | End: 2024-01-20
Attending: RADIOLOGY

## 2024-01-17 LAB
ANION GAP SERPL CALC-SCNC: 5 MMOL/L (ref 5–15)
BASOPHILS # BLD: 0 K/UL (ref 0–0.1)
BASOPHILS NFR BLD: 0 % (ref 0–1)
BUN SERPL-MCNC: 26 MG/DL (ref 6–20)
BUN/CREAT SERPL: 26 (ref 12–20)
CA-I BLD-MCNC: 9.5 MG/DL (ref 8.5–10.1)
CHLORIDE SERPL-SCNC: 101 MMOL/L (ref 97–108)
CO2 SERPL-SCNC: 29 MMOL/L (ref 21–32)
CREAT SERPL-MCNC: 1 MG/DL (ref 0.55–1.02)
DIFFERENTIAL METHOD BLD: ABNORMAL
EOSINOPHIL # BLD: 0 K/UL (ref 0–0.4)
EOSINOPHIL NFR BLD: 0 % (ref 0–7)
ERYTHROCYTE [DISTWIDTH] IN BLOOD BY AUTOMATED COUNT: 15.1 % (ref 11.5–14.5)
GLUCOSE BLD STRIP.AUTO-MCNC: 280 MG/DL (ref 65–100)
GLUCOSE BLD STRIP.AUTO-MCNC: 290 MG/DL (ref 65–100)
GLUCOSE BLD STRIP.AUTO-MCNC: 325 MG/DL (ref 65–100)
GLUCOSE SERPL-MCNC: 339 MG/DL (ref 65–100)
HCT VFR BLD AUTO: 41.5 % (ref 35–47)
HGB BLD-MCNC: 13.5 G/DL (ref 11.5–16)
IMM GRANULOCYTES # BLD AUTO: 0 K/UL (ref 0–0.04)
IMM GRANULOCYTES NFR BLD AUTO: 1 % (ref 0–0.5)
LYMPHOCYTES # BLD: 1.1 K/UL (ref 0.8–3.5)
LYMPHOCYTES NFR BLD: 13 % (ref 12–49)
MCH RBC QN AUTO: 28.2 PG (ref 26–34)
MCHC RBC AUTO-ENTMCNC: 32.5 G/DL (ref 30–36.5)
MCV RBC AUTO: 86.8 FL (ref 80–99)
MONOCYTES # BLD: 0.7 K/UL (ref 0–1)
MONOCYTES NFR BLD: 9 % (ref 5–13)
NEUTS SEG # BLD: 6.6 K/UL (ref 1.8–8)
NEUTS SEG NFR BLD: 77 % (ref 32–75)
NRBC # BLD: 0 K/UL (ref 0–0.01)
NRBC BLD-RTO: 0 PER 100 WBC
PERFORMED BY:: ABNORMAL
PLATELET # BLD AUTO: 278 K/UL (ref 150–400)
PMV BLD AUTO: 9.5 FL (ref 8.9–12.9)
POTASSIUM SERPL-SCNC: 3.6 MMOL/L (ref 3.5–5.1)
RBC # BLD AUTO: 4.78 M/UL (ref 3.8–5.2)
SODIUM SERPL-SCNC: 135 MMOL/L (ref 136–145)
WBC # BLD AUTO: 8.5 K/UL (ref 3.6–11)

## 2024-01-17 PROCEDURE — 1100000000 HC RM PRIVATE

## 2024-01-17 PROCEDURE — 6370000000 HC RX 637 (ALT 250 FOR IP): Performed by: STUDENT IN AN ORGANIZED HEALTH CARE EDUCATION/TRAINING PROGRAM

## 2024-01-17 PROCEDURE — 77334 RADIATION TREATMENT AID(S): CPT

## 2024-01-17 PROCEDURE — 6370000000 HC RX 637 (ALT 250 FOR IP)

## 2024-01-17 PROCEDURE — 36415 COLL VENOUS BLD VENIPUNCTURE: CPT

## 2024-01-17 PROCEDURE — 80048 BASIC METABOLIC PNL TOTAL CA: CPT

## 2024-01-17 PROCEDURE — 6360000002 HC RX W HCPCS: Performed by: INTERNAL MEDICINE

## 2024-01-17 PROCEDURE — 6370000000 HC RX 637 (ALT 250 FOR IP): Performed by: INTERNAL MEDICINE

## 2024-01-17 PROCEDURE — 77290 THER RAD SIMULAJ FIELD CPLX: CPT

## 2024-01-17 PROCEDURE — 77295 3-D RADIOTHERAPY PLAN: CPT

## 2024-01-17 PROCEDURE — 82962 GLUCOSE BLOOD TEST: CPT

## 2024-01-17 PROCEDURE — 77300 RADIATION THERAPY DOSE PLAN: CPT

## 2024-01-17 PROCEDURE — 85025 COMPLETE CBC W/AUTO DIFF WBC: CPT

## 2024-01-17 PROCEDURE — 2580000003 HC RX 258: Performed by: INTERNAL MEDICINE

## 2024-01-17 RX ADMIN — ENOXAPARIN SODIUM 40 MG: 100 INJECTION SUBCUTANEOUS at 08:06

## 2024-01-17 RX ADMIN — HYDROCHLOROTHIAZIDE 25 MG: 25 TABLET ORAL at 08:06

## 2024-01-17 RX ADMIN — GUAIFENESIN 600 MG: 600 TABLET, EXTENDED RELEASE ORAL at 08:06

## 2024-01-17 RX ADMIN — INSULIN GLARGINE 10 UNITS: 100 INJECTION, SOLUTION SUBCUTANEOUS at 08:05

## 2024-01-17 RX ADMIN — DEXAMETHASONE SODIUM PHOSPHATE 4 MG: 4 INJECTION INTRA-ARTICULAR; INTRALESIONAL; INTRAMUSCULAR; INTRAVENOUS; SOFT TISSUE at 21:50

## 2024-01-17 RX ADMIN — DEXAMETHASONE SODIUM PHOSPHATE 4 MG: 4 INJECTION INTRA-ARTICULAR; INTRALESIONAL; INTRAMUSCULAR; INTRAVENOUS; SOFT TISSUE at 17:15

## 2024-01-17 RX ADMIN — INSULIN LISPRO 6 UNITS: 100 INJECTION, SOLUTION INTRAVENOUS; SUBCUTANEOUS at 08:05

## 2024-01-17 RX ADMIN — SODIUM CHLORIDE, PRESERVATIVE FREE 10 ML: 5 INJECTION INTRAVENOUS at 08:08

## 2024-01-17 RX ADMIN — INSULIN LISPRO 4 UNITS: 100 INJECTION, SOLUTION INTRAVENOUS; SUBCUTANEOUS at 12:31

## 2024-01-17 RX ADMIN — SODIUM CHLORIDE, PRESERVATIVE FREE 10 ML: 5 INJECTION INTRAVENOUS at 21:50

## 2024-01-17 RX ADMIN — INSULIN LISPRO 4 UNITS: 100 INJECTION, SOLUTION INTRAVENOUS; SUBCUTANEOUS at 17:14

## 2024-01-17 RX ADMIN — DEXAMETHASONE SODIUM PHOSPHATE 4 MG: 4 INJECTION INTRA-ARTICULAR; INTRALESIONAL; INTRAMUSCULAR; INTRAVENOUS; SOFT TISSUE at 04:00

## 2024-01-17 RX ADMIN — AMLODIPINE BESYLATE 10 MG: 5 TABLET ORAL at 08:09

## 2024-01-17 RX ADMIN — DEXAMETHASONE SODIUM PHOSPHATE 4 MG: 4 INJECTION INTRA-ARTICULAR; INTRALESIONAL; INTRAMUSCULAR; INTRAVENOUS; SOFT TISSUE at 08:07

## 2024-01-17 ASSESSMENT — ENCOUNTER SYMPTOMS
SINUS PRESSURE: 0
NAUSEA: 0
DIARRHEA: 0
ABDOMINAL PAIN: 0
SHORTNESS OF BREATH: 0
CONSTIPATION: 0
BACK PAIN: 0
COUGH: 0
WHEEZING: 0

## 2024-01-17 NOTE — NOTE TO PATIENT VIA PORTAL
Call placed to patient daughter to requested consent for virtual sitter. Daughter accepted to using virtual sitter.

## 2024-01-17 NOTE — PROGRESS NOTES
Hematology/Oncology   Progress Note    Patient: Patience Porras MRN: [id number]     YOB: 1960  Age: 63 y.o.  Sex: female      Admit Date: 1/12/2024    LOS: 5 days     Chief Complaint: mental status change  HemOnc: lung cancer with brain metastasis    Subjective:   Remains confused, still some difficulty finding words  No new complaints     ROS: unable to reliably obtain due to AMS    Current Facility-Administered Medications:     guaiFENesin (MUCINEX) extended release tablet 600 mg, 600 mg, Oral, BID, Brianna Gayle PA-C, 600 mg at 01/17/24 0806    insulin glargine (LANTUS) injection vial 10 Units, 10 Units, SubCUTAneous, Daily, Brianna Gayle PA-C, 10 Units at 01/17/24 0805    benzonatate (TESSALON) capsule 100 mg, 100 mg, Oral, TID PRN, Brianna Gayle PA-C, 100 mg at 01/16/24 0953    dexAMETHasone (DECADRON) injection 4 mg, 4 mg, IntraVENous, Q6H, Vishnu Rosa MD, 4 mg at 01/17/24 0807    insulin lispro (HUMALOG) injection vial 0-8 Units, 0-8 Units, SubCUTAneous, TID WC, Noe Patel PA-C, 6 Units at 01/17/24 0805    insulin lispro (HUMALOG) injection vial 0-4 Units, 0-4 Units, SubCUTAneous, Nightly, Noe Patel PA-C, 0 Units at 01/16/24 2026    glucose chewable tablet 16 g, 4 tablet, Oral, PRN, Noe Patel PA-C    dextrose bolus 10% 125 mL, 125 mL, IntraVENous, PRN **OR** dextrose bolus 10% 250 mL, 250 mL, IntraVENous, PRN, Noe Patel PA-C    glucagon injection 1 mg, 1 mg, SubCUTAneous, PRN, Noe Patel PA-C    dextrose 10 % infusion, , IntraVENous, Continuous PRN, Noe Patel PA-C    labetalol (NORMODYNE;TRANDATE) injection 10 mg, 10 mg, IntraVENous, Q4H PRN, Mitchell Worthington MD, 10 mg at 01/16/24 2026    sodium chloride flush 0.9 % injection 5-40 mL, 5-40 mL, IntraVENous, 2 times per day, Mitchell Worthington MD, 10 mL at 01/17/24 0808    sodium chloride flush 0.9 % injection 5-40 mL, 5-40 mL, IntraVENous, PRN, Mitchell Worthington MD, 10 mL

## 2024-01-17 NOTE — CARE COORDINATION
0755: Chart reviewed.    Per notes; patient followed via oncology.    Patient has home oxygen for PRN use.    Current Dispo: Home with daughter.    CM will continue to follow patient and recs of medical team.

## 2024-01-17 NOTE — PROGRESS NOTES
Hospitalist Progress Note    NAME:   Patience Porras   : 1960   MRN: [id number]     Date/Time: 2024 4:47 PM  Patient PCP: No primary care provider on file.    Estimated discharge date: 24 hours  Barriers: oncology clearance, WBXRT, goals of care decisions      HOSPITAL COURSE:    Patience Porras is a 63 y.o. female with COPD, diabetes, hypertension and lung cancer who presented to the ED with chief complaint of altered mental status. No additional history obtained from patient due to poor mentation. No family at bedside. Reportedly EMS, called for constipation. However she did not report constipation or abdominal pain while in the ED.      CT head showed multiple brain masses concerning for metastasis, no acute hemorrhage. Patient admitted for further management. Oncology consult. Patient status post gamma knife currently on immunotherapy for maintenance. She is active with Neurosurgery at [facility] for CNS mets. Started on IV steroids given new altered mental status. Per oncology, will need MRI and XRT to plan next gamma knife. MRI of brain shows multiple peripheral enhancement intracranial metastasis measuring up to 3.4 x 2.7 cm in the anterior right frontal lobe and 2.2 x 2.5 cm in the left temporal lobe.  Associated with moderate to large vasogenic edema and mass effect with up to 3 mm left to right midline brain shift.  No infarct or intracranial hemorrhage. Oncology and rad/onc will start WB XRT tomorrow.    Assessment / Plan:  Metastatic cancer to brain  - Given metastasis to brain, very poor prognosis  - MRI of brain shows multiple peripheral enhancement intracranial metastasis measuring up to 3.4 x 2.7 cm in the anterior right frontal lobe and 2.2 x 2.5 cm in the left temporal lobe.  Associated with moderate to large vasogenic edema and mass effect with up to 3 mm left to right midline brain shift.  No infarct or intracranial hemorrhage.  - Status post gamma knife currently on immunotherapy for

## 2024-01-17 NOTE — PLAN OF CARE
Problem: Discharge Planning  Goal: Discharge to home or other facility with appropriate resources  1/16/2024 2210 by Adelina Penny RN  Outcome: Progressing  Flowsheets (Taken 1/16/2024 1514 by Esmer Avila RN)  Discharge to home or other facility with appropriate resources: Identify barriers to discharge with patient and caregiver  1/16/2024 1512 by Esmer Avila RN  Outcome: Progressing     Problem: Safety - Adult  Goal: Free from fall injury  1/16/2024 2210 by Adelina Penny RN  Outcome: Progressing  1/16/2024 1512 by Esmer Avila RN  Outcome: Progressing     Problem: Skin/Tissue Integrity  Goal: Absence of new skin breakdown  Description: 1.  Monitor for areas of redness and/or skin breakdown  2.  Assess vascular access sites hourly  3.  Every 4-6 hours minimum:  Change oxygen saturation probe site  4.  Every 4-6 hours:  If on nasal continuous positive airway pressure, respiratory therapy assess nares and determine need for appliance change or resting period.  1/16/2024 2210 by Adelina Penny RN  Outcome: Progressing  1/16/2024 1512 by Esmer Avila RN  Outcome: Progressing     Problem: Chronic Conditions and Co-morbidities  Goal: Patient's chronic conditions and co-morbidity symptoms are monitored and maintained or improved  1/16/2024 2210 by Adelina Penny RN  Outcome: Progressing  1/16/2024 1512 by Esmer Avila RN  Outcome: Adequate for Discharge

## 2024-01-17 NOTE — PROGRESS NOTES
4 Eyes Skin Assessment     NAME:  Patience Porras  YOB: 1960  MEDICAL RECORD NUMBER:  [id number]    The patient is being assessed for  Other Weekly assessment    I agree that at least one RN has performed a thorough Head to Toe Skin Assessment on the patient. ALL assessment sites listed below have been assessed.      Areas assessed by both nurses:    Head, Face, Ears, Shoulders, Back, Chest, Arms, Elbows, Hands, Sacrum. Buttock, Coccyx, Ischium, Legs. Feet and Heels, and Under Medical Devices         Does the Patient have a Wound? No noted wound(s)       Alejandro Prevention initiated by RN: Yes  Wound Care Orders initiated by RN: No    Pressure Injury (Stage 3,4, Unstageable, DTI, NWPT, and Complex wounds) if present, place Wound referral order by RN under : No    New Ostomies, if present place, Ostomy referral order under : No     Nurse 1 eSignature: Electronically signed by Adelina Penny RN on 1/17/24 at 12:47 AM EST    **SHARE this note so that the co-signing nurse can place an eSignature**    Nurse 2 eSignature: Electronically signed by Radha Guajardo RN on 1/17/24 at 1:05 AM EST

## 2024-01-18 ENCOUNTER — HOSPITAL ENCOUNTER (OUTPATIENT)
Facility: HOSPITAL | Age: 64
Discharge: HOME | End: 2024-01-21
Attending: RADIOLOGY
Payer: MEDICAID

## 2024-01-18 LAB
ANION GAP SERPL CALC-SCNC: 8 MMOL/L (ref 5–15)
BASOPHILS # BLD: 0 K/UL (ref 0–0.1)
BASOPHILS NFR BLD: 0 % (ref 0–1)
BUN SERPL-MCNC: 28 MG/DL (ref 6–20)
BUN/CREAT SERPL: 23 (ref 12–20)
CA-I BLD-MCNC: 9.9 MG/DL (ref 8.5–10.1)
CHLORIDE SERPL-SCNC: 98 MMOL/L (ref 97–108)
CO2 SERPL-SCNC: 28 MMOL/L (ref 21–32)
CREAT SERPL-MCNC: 1.24 MG/DL (ref 0.55–1.02)
DIFFERENTIAL METHOD BLD: ABNORMAL
EOSINOPHIL # BLD: 0 K/UL (ref 0–0.4)
EOSINOPHIL NFR BLD: 0 % (ref 0–7)
ERYTHROCYTE [DISTWIDTH] IN BLOOD BY AUTOMATED COUNT: 14.8 % (ref 11.5–14.5)
GLUCOSE BLD STRIP.AUTO-MCNC: 199 MG/DL (ref 65–100)
GLUCOSE BLD STRIP.AUTO-MCNC: 251 MG/DL (ref 65–100)
GLUCOSE BLD STRIP.AUTO-MCNC: 272 MG/DL (ref 65–100)
GLUCOSE BLD STRIP.AUTO-MCNC: 317 MG/DL (ref 65–100)
GLUCOSE SERPL-MCNC: 332 MG/DL (ref 65–100)
HCT VFR BLD AUTO: 43.8 % (ref 35–47)
HGB BLD-MCNC: 14.6 G/DL (ref 11.5–16)
IMM GRANULOCYTES # BLD AUTO: 0.1 K/UL (ref 0–0.04)
IMM GRANULOCYTES NFR BLD AUTO: 1 % (ref 0–0.5)
LYMPHOCYTES # BLD: 0.9 K/UL (ref 0.8–3.5)
LYMPHOCYTES NFR BLD: 9 % (ref 12–49)
MCH RBC QN AUTO: 28.7 PG (ref 26–34)
MCHC RBC AUTO-ENTMCNC: 33.3 G/DL (ref 30–36.5)
MCV RBC AUTO: 86.2 FL (ref 80–99)
MONOCYTES # BLD: 0.4 K/UL (ref 0–1)
MONOCYTES NFR BLD: 4 % (ref 5–13)
NEUTS SEG # BLD: 9.1 K/UL (ref 1.8–8)
NEUTS SEG NFR BLD: 86 % (ref 32–75)
NRBC # BLD: 0 K/UL (ref 0–0.01)
NRBC BLD-RTO: 0 PER 100 WBC
PERFORMED BY:: ABNORMAL
PLATELET # BLD AUTO: 343 K/UL (ref 150–400)
PMV BLD AUTO: 9.5 FL (ref 8.9–12.9)
POTASSIUM SERPL-SCNC: 3.6 MMOL/L (ref 3.5–5.1)
RAD ONC ARIA COURSE FIRST TREATMENT DATE: NORMAL
RAD ONC ARIA COURSE ID: NORMAL
RAD ONC ARIA COURSE INTENT: NORMAL
RAD ONC ARIA COURSE LAST TREATMENT DATE: NORMAL
RAD ONC ARIA COURSE SESSION NUMBER: 1
RAD ONC ARIA COURSE START DATE: NORMAL
RAD ONC ARIA COURSE TREATMENT ELAPSED DAYS: 0
RAD ONC ARIA PLAN FRACTIONS TREATED TO DATE: 1
RAD ONC ARIA PLAN ID: NORMAL
RAD ONC ARIA PLAN PRESCRIBED DOSE PER FRACTION: 2 GY
RAD ONC ARIA PLAN PRIMARY REFERENCE POINT: NORMAL
RAD ONC ARIA PLAN TOTAL FRACTIONS PRESCRIBED: 10
RAD ONC ARIA PLAN TOTAL PRESCRIBED DOSE: 2000 CGY
RAD ONC ARIA REFERENCE POINT DOSAGE GIVEN TO DATE: 2 GY
RAD ONC ARIA REFERENCE POINT ID: NORMAL
RAD ONC ARIA REFERENCE POINT SESSION DOSAGE GIVEN: 2 GY
RBC # BLD AUTO: 5.08 M/UL (ref 3.8–5.2)
SODIUM SERPL-SCNC: 134 MMOL/L (ref 136–145)
WBC # BLD AUTO: 10.5 K/UL (ref 3.6–11)

## 2024-01-18 PROCEDURE — 77412 RADIATION TX DELIVERY LVL 3: CPT

## 2024-01-18 PROCEDURE — 36415 COLL VENOUS BLD VENIPUNCTURE: CPT

## 2024-01-18 PROCEDURE — 2580000003 HC RX 258

## 2024-01-18 PROCEDURE — 80048 BASIC METABOLIC PNL TOTAL CA: CPT

## 2024-01-18 PROCEDURE — 6370000000 HC RX 637 (ALT 250 FOR IP): Performed by: STUDENT IN AN ORGANIZED HEALTH CARE EDUCATION/TRAINING PROGRAM

## 2024-01-18 PROCEDURE — 6360000002 HC RX W HCPCS: Performed by: INTERNAL MEDICINE

## 2024-01-18 PROCEDURE — 1100000000 HC RM PRIVATE

## 2024-01-18 PROCEDURE — 6370000000 HC RX 637 (ALT 250 FOR IP): Performed by: INTERNAL MEDICINE

## 2024-01-18 PROCEDURE — 6370000000 HC RX 637 (ALT 250 FOR IP)

## 2024-01-18 PROCEDURE — 82962 GLUCOSE BLOOD TEST: CPT

## 2024-01-18 PROCEDURE — 77417 THER RADIOLOGY PORT IMAGE(S): CPT

## 2024-01-18 PROCEDURE — 2580000003 HC RX 258: Performed by: INTERNAL MEDICINE

## 2024-01-18 PROCEDURE — 85025 COMPLETE CBC W/AUTO DIFF WBC: CPT

## 2024-01-18 RX ORDER — INSULIN GLARGINE 100 [IU]/ML
6 INJECTION, SOLUTION SUBCUTANEOUS ONCE
Status: COMPLETED | OUTPATIENT
Start: 2024-01-18 | End: 2024-01-18

## 2024-01-18 RX ORDER — SODIUM CHLORIDE 9 MG/ML
INJECTION, SOLUTION INTRAVENOUS CONTINUOUS
Status: DISPENSED | OUTPATIENT
Start: 2024-01-18 | End: 2024-01-19

## 2024-01-18 RX ORDER — INSULIN GLARGINE 100 [IU]/ML
15 INJECTION, SOLUTION SUBCUTANEOUS DAILY
Status: DISCONTINUED | OUTPATIENT
Start: 2024-01-19 | End: 2024-01-19 | Stop reason: HOSPADM

## 2024-01-18 RX ADMIN — ENOXAPARIN SODIUM 40 MG: 100 INJECTION SUBCUTANEOUS at 10:13

## 2024-01-18 RX ADMIN — SODIUM CHLORIDE: 9 INJECTION, SOLUTION INTRAVENOUS at 17:12

## 2024-01-18 RX ADMIN — DEXAMETHASONE SODIUM PHOSPHATE 4 MG: 4 INJECTION INTRA-ARTICULAR; INTRALESIONAL; INTRAMUSCULAR; INTRAVENOUS; SOFT TISSUE at 05:00

## 2024-01-18 RX ADMIN — INSULIN GLARGINE 10 UNITS: 100 INJECTION, SOLUTION SUBCUTANEOUS at 09:30

## 2024-01-18 RX ADMIN — GUAIFENESIN 600 MG: 600 TABLET, EXTENDED RELEASE ORAL at 10:13

## 2024-01-18 RX ADMIN — DEXAMETHASONE SODIUM PHOSPHATE 4 MG: 4 INJECTION INTRA-ARTICULAR; INTRALESIONAL; INTRAMUSCULAR; INTRAVENOUS; SOFT TISSUE at 17:12

## 2024-01-18 RX ADMIN — GUAIFENESIN 600 MG: 600 TABLET, EXTENDED RELEASE ORAL at 22:59

## 2024-01-18 RX ADMIN — SODIUM CHLORIDE, PRESERVATIVE FREE 10 ML: 5 INJECTION INTRAVENOUS at 10:13

## 2024-01-18 RX ADMIN — DEXAMETHASONE SODIUM PHOSPHATE 4 MG: 4 INJECTION INTRA-ARTICULAR; INTRALESIONAL; INTRAMUSCULAR; INTRAVENOUS; SOFT TISSUE at 22:59

## 2024-01-18 RX ADMIN — Medication 5 MG: at 23:00

## 2024-01-18 RX ADMIN — INSULIN LISPRO 6 UNITS: 100 INJECTION, SOLUTION INTRAVENOUS; SUBCUTANEOUS at 12:55

## 2024-01-18 RX ADMIN — AMLODIPINE BESYLATE 10 MG: 5 TABLET ORAL at 10:13

## 2024-01-18 RX ADMIN — INSULIN LISPRO 2 UNITS: 100 INJECTION, SOLUTION INTRAVENOUS; SUBCUTANEOUS at 09:30

## 2024-01-18 RX ADMIN — INSULIN GLARGINE 6 UNITS: 100 INJECTION, SOLUTION SUBCUTANEOUS at 17:12

## 2024-01-18 RX ADMIN — DEXAMETHASONE SODIUM PHOSPHATE 4 MG: 4 INJECTION INTRA-ARTICULAR; INTRALESIONAL; INTRAMUSCULAR; INTRAVENOUS; SOFT TISSUE at 10:13

## 2024-01-18 RX ADMIN — INSULIN LISPRO 4 UNITS: 100 INJECTION, SOLUTION INTRAVENOUS; SUBCUTANEOUS at 22:59

## 2024-01-18 ASSESSMENT — ENCOUNTER SYMPTOMS
COUGH: 0
BACK PAIN: 0
SHORTNESS OF BREATH: 0
NAUSEA: 0
CONSTIPATION: 0
SINUS PRESSURE: 0
ABDOMINAL PAIN: 0
DIARRHEA: 0
WHEEZING: 0

## 2024-01-18 NOTE — CARE COORDINATION
0745: Chart reviewed.     Per notes; patient followed via oncology.     Patient has home oxygen for PRN use.     Current Dispo: Home with daughter.     CM will continue to follow patient and recs of medical team.

## 2024-01-18 NOTE — PROGRESS NOTES
Hematology/Oncology   Progress Note    Patient: Patience Porras MRN: [id number]     YOB: 1960  Age: 63 y.o.  Sex: female      Admit Date: 1/12/2024    LOS: 6 days     Chief Complaint: mental status change  HemOnc: lung cancer with brain metastasis    Subjective:   Remains confused, still some difficulty finding words  No new complaints     ROS: unable to reliably obtain due to AMS    Current Facility-Administered Medications:     guaiFENesin (MUCINEX) extended release tablet 600 mg, 600 mg, Oral, BID, Brianna Gayle PA-C, 600 mg at 01/17/24 0806    insulin glargine (LANTUS) injection vial 10 Units, 10 Units, SubCUTAneous, Daily, Brianna Gayle PA-C, 10 Units at 01/17/24 0805    benzonatate (TESSALON) capsule 100 mg, 100 mg, Oral, TID PRN, Brianna Gayle PA-C, 100 mg at 01/16/24 0953    dexAMETHasone (DECADRON) injection 4 mg, 4 mg, IntraVENous, Q6H, Vishnu Rosa MD, 4 mg at 01/18/24 0500    insulin lispro (HUMALOG) injection vial 0-8 Units, 0-8 Units, SubCUTAneous, TID WC, Noe Patel PA-C, 4 Units at 01/17/24 1714    insulin lispro (HUMALOG) injection vial 0-4 Units, 0-4 Units, SubCUTAneous, Nightly, Noe Patel PA-C, 0 Units at 01/16/24 2026    glucose chewable tablet 16 g, 4 tablet, Oral, PRN, Noe Patel PA-C    dextrose bolus 10% 125 mL, 125 mL, IntraVENous, PRN **OR** dextrose bolus 10% 250 mL, 250 mL, IntraVENous, PRN, Noe Patel PA-C    glucagon injection 1 mg, 1 mg, SubCUTAneous, PRN, Noe Patel PA-C    dextrose 10 % infusion, , IntraVENous, Continuous PRN, Noe Patel PA-C    labetalol (NORMODYNE;TRANDATE) injection 10 mg, 10 mg, IntraVENous, Q4H PRN, Mitchell Worthington MD, 10 mg at 01/16/24 2026    sodium chloride flush 0.9 % injection 5-40 mL, 5-40 mL, IntraVENous, 2 times per day, Mitchell Worthington MD, 10 mL at 01/17/24 2150    sodium chloride flush 0.9 % injection 5-40 mL, 5-40 mL, IntraVENous, PRN, Mitchell Worthington MD, 10 mL       Height:          Physical Exam:   Constitutional Ill-appearing, confused   Head Normocephalic; no scars   Eyes Conjunctivae and sclerae are clear and without icterus.    ENMT No oral exudates, ulcers, masses, thrush or mucositis.    Neck Supple without masses or thyromegaly. No jugular venous distension.   Hematologic/Lymphatic No petechiae or purpura.  No tender or palpable lymph nodes in the cervical, supraclavicular, axillary or inguinal area.   Respiratory Lungs are clear to auscultation without rhonchi or wheezing.   Cardiovascular Regular rate and rhythm of heart without murmurs, gallops or rubs.   Chest / Line Site Chest is symmetric with no chest wall deformities.   Abdomen Non-tender, non-distended, no masses, ascites or hepatosplenomegaly. Good bowel sounds. No guarding or rebound tenderness. No pulsatile masses.   Musculoskeletal No tenderness or swelling, normal range of motion    Extremities No visible deformities, no cyanosis, clubbing or edema.    Skin No rashes, scars, or lesions suggestive of malignancy. No petechiae, purpura, or ecchymoses. No excoriations.    Neurologic AMS, confused   Psychiatric AMS     Lab/Data Review:  Recent Labs     01/16/24  0629 01/17/24  0723   WBC 10.4 8.5   HGB 13.8 13.5   HCT 40.7 41.5    278     Recent Labs     01/16/24  0629 01/17/24  0723   * 135*   K 3.5 3.6   CL 99 101   CO2 28 29   BUN 23* 26*     No results for input(s): \"PH\", \"PCO2\", \"PO2\", \"HCO3\", \"FIO2\" in the last 72 hours.  Recent Results (from the past 24 hour(s))   POCT Glucose    Collection Time: 01/17/24 11:37 AM   Result Value Ref Range    POC Glucose 280 (H) 65 - 100 mg/dL    Performed by: Dipesh Chicas    POCT Glucose    Collection Time: 01/17/24  3:48 PM   Result Value Ref Range    POC Glucose 290 (H) 65 - 100 mg/dL    Performed by: Ricardo Castorena    POCT Glucose    Collection Time: 01/18/24  5:21 AM   Result Value Ref Range    POC Glucose 251 (H) 65 - 100 mg/dL    Performed by:  Continue home medications.

## 2024-01-18 NOTE — PROGRESS NOTES
maintenance  - IV decadron  - Spoke to daughter, Елена Cortez, about result findings and overall prognosis. She will be reaching out to family members.    - Consult oncology; they are looking for improvement with steroids and mental status.  They also consulted radiation oncology. They are starting whole brain xrt today.      Altered mental status  - Likely related to brain metastasis   - However unable to rule out underlying psychiatric disorder. IM haldol PRN for now.      Mild JOSEE  - IV fluids started    Cough and sinus pain  - Started Mucinex and Tessalon Perles  -No fever at this time, will continue to monitor patient.    Diabetes  - Uncertain home medications  - POC BS + sliding scale insulin  - Hyperglycemia due to steroids.  Increase Lantus.  - HbA1c       Hypertension   - Continue home medications.    - IV labetalol PRN      COPD  - Not in exacerbation    Low TSH  - T3, T4 pending      Medical Decision Making:   I personally reviewed labs: CBC, CMP  I personally reviewed imaging: None  Toxic drug monitoring: Lovenox for signs of bleeding   Discussed case with: patient and nursing.       Code Status: FULL  DVT Prophylaxis: Lovenox   GI Prophylaxis: None    Subjective:     Chief Complaint / Reason for Physician Visit  Patient seen and examined at bedside.  She is pleasantly confused.  She is asking when she can go home, she is tired of being in the hospital.  Denies any headache, visual changes or other complaints at this time.  Discussed with RN events overnight.       Objective:     VITALS:   Last 24hrs VS reviewed since prior progress note. Most recent are:  Patient Vitals for the past 24 hrs:   BP Temp Temp src Pulse Resp SpO2   01/18/24 0748 (!) 150/90 97.9 °F (36.6 °C) Oral 66 20 96 %   01/18/24 0517 (!) 168/99 98.1 °F (36.7 °C) Oral 67 20 97 %   01/17/24 1555 132/76 99.3 °F (37.4 °C) Oral 70 20 100 %           Intake/Output Summary (Last 24 hours) at 1/18/2024 1423  Last data filed at 1/17/2024  2150  Gross per 24 hour   Intake 740 ml   Output --   Net 740 ml          I had a face to face encounter and independently examined this patient on 1/18/2024, as outlined below:    Review of Systems   Constitutional:  Negative for chills and fever.   HENT:  Negative for sinus pressure.    Eyes:  Negative for visual disturbance.   Respiratory:  Negative for cough, shortness of breath and wheezing.    Cardiovascular:  Negative for chest pain, palpitations and leg swelling.   Gastrointestinal:  Negative for abdominal pain, constipation, diarrhea and nausea.   Genitourinary:  Negative for dysuria, flank pain and frequency.   Musculoskeletal:  Negative for arthralgias and back pain.   Skin:  Negative for rash and wound.   Neurological:  Negative for dizziness, seizures and headaches.   Psychiatric/Behavioral:  The patient is not nervous/anxious.         PHYSICAL EXAM:  Physical Exam  Constitutional:       General: She is not in acute distress.  HENT:      Head: Normocephalic and atraumatic.   Eyes:      Extraocular Movements: Extraocular movements intact.      Pupils: Pupils are equal, round, and reactive to light.   Cardiovascular:      Rate and Rhythm: Normal rate and regular rhythm.      Heart sounds: No murmur heard.     No gallop.   Pulmonary:      Effort: Pulmonary effort is normal.      Breath sounds: No wheezing, rhonchi or rales.   Abdominal:      General: Abdomen is flat. There is no distension.      Palpations: Abdomen is soft.      Tenderness: There is no abdominal tenderness.   Musculoskeletal:         General: Normal range of motion.      Right lower leg: No edema.      Left lower leg: No edema.   Skin:     General: Skin is warm and dry.      Findings: No rash.   Neurological:      Mental Status: She is alert. She is disoriented.      Cranial Nerves: No cranial nerve deficit.   Psychiatric:         Behavior: Behavior normal.          Reviewed most current lab test results and cultures  YES  Reviewed most

## 2024-01-18 NOTE — PROGRESS NOTES
Spiritual Care Assessment/Progress Note  [facility]    Name: Patiecne Porras MRN: [id number]    Age: 63 y.o.     Sex: female   Language: English     Date: 1/18/2024            Total Time Calculated: 23 min              Spiritual Assessment begun in [facility] ONCOLOGY  Service Provided For:: Patient  Referral/Consult From:: Rounding  Encounter Overview/Reason : Initial Encounter    Spiritual beliefs:      [x] Involved in a rajinder tradition/spiritual practice:      [] Supported by a rajinder community:      [] Claims no spiritual orientation:      [] Seeking spiritual identity:           [] Adheres to an individual form of spirituality:      [] Not able to assess:                Identified resources for coping and support system:   Support System: Unknown       [x] Prayer                  [] Devotional reading               [] Music                  [] Guided Imagery     [] Pet visits                                        [] Other: (COMMENT)     Specific area/focus of visit   Encounter:    Crisis:    Spiritual/Emotional needs: Type: Spiritual Support  Ritual, Rites and Sacraments:    Grief, Loss, and Adjustments:    Ethics/Mediation:    Behavioral Health:    Palliative Care:    Advance Care Planning:           Narrative:   Visited patient in room #407. Ms. Porras was present during the visit. She was sitting on the edge of her bed. She stood up and held my hands as I entered the room. She was excited to receive a  visit. She shared about her spiritual life and how she directs her concerns and feelings to God. She expressed gratitude and optimism for her relationship with Víctor Bhavesh. Provided prayer per request, support of presence, attentive listening, and engaging conversation. Advised of  availability.     Fanny Coello DMin.    can be reached by calling  at [facility] [phone]

## 2024-01-18 NOTE — PROGRESS NOTES
2150 Patient refused her night medicine of mucinex, melatonin, and accucheck for blood glucose. Patient allowed me to assess her lung sounds and administer decadron iv with iv flush. Patient is alert to person, and place. Disoriented to time and situation. The patient then became agitated and started yelling.   Patient observer monitor is in room, the monitor attendant's name is Daria.       2330 Patient was lying in bed on right side. Respirations unlabored, resting with eyes closed. Will continue to monitor.     0530 Patient was calm. Able to check vital signs and check blood glucose. Patient able to follow commands, no c/o pain. BP was 168/99, pulse 67. Did not meet parameters for prn labetalol for SBP greater than 140/100.

## 2024-01-19 ENCOUNTER — HOSPITAL ENCOUNTER (OUTPATIENT)
Facility: HOSPITAL | Age: 64
Discharge: HOME | End: 2024-01-22
Attending: RADIOLOGY
Payer: MEDICAID

## 2024-01-19 VITALS
HEIGHT: 66 IN | TEMPERATURE: 97.3 F | OXYGEN SATURATION: 95 % | HEART RATE: 71 BPM | WEIGHT: 190 LBS | RESPIRATION RATE: 20 BRPM | SYSTOLIC BLOOD PRESSURE: 141 MMHG | BODY MASS INDEX: 30.53 KG/M2 | DIASTOLIC BLOOD PRESSURE: 78 MMHG

## 2024-01-19 PROBLEM — N17.9 AKI (ACUTE KIDNEY INJURY) (HCC): Status: ACTIVE | Noted: 2024-01-19

## 2024-01-19 PROBLEM — E11.9 DIABETES MELLITUS (HCC): Status: ACTIVE | Noted: 2024-01-19

## 2024-01-19 PROBLEM — C79.31 METASTATIC CANCER TO BRAIN (HCC): Status: ACTIVE | Noted: 2024-01-19

## 2024-01-19 PROBLEM — I10 HYPERTENSION: Status: ACTIVE | Noted: 2024-01-19

## 2024-01-19 PROBLEM — N17.9 AKI (ACUTE KIDNEY INJURY) (HCC): Status: RESOLVED | Noted: 2024-01-19 | Resolved: 2024-01-19

## 2024-01-19 PROBLEM — R41.82 ALTERED MENTAL STATUS: Status: ACTIVE | Noted: 2024-01-19

## 2024-01-19 PROBLEM — J44.9 COPD (CHRONIC OBSTRUCTIVE PULMONARY DISEASE) (HCC): Status: ACTIVE | Noted: 2024-01-19

## 2024-01-19 LAB
ANION GAP SERPL CALC-SCNC: 8 MMOL/L (ref 5–15)
BASOPHILS # BLD: 0 K/UL (ref 0–0.1)
BASOPHILS NFR BLD: 0 % (ref 0–1)
BUN SERPL-MCNC: 32 MG/DL (ref 6–20)
BUN/CREAT SERPL: 28 (ref 12–20)
CA-I BLD-MCNC: 9.5 MG/DL (ref 8.5–10.1)
CHLORIDE SERPL-SCNC: 100 MMOL/L (ref 97–108)
CO2 SERPL-SCNC: 27 MMOL/L (ref 21–32)
CREAT SERPL-MCNC: 1.13 MG/DL (ref 0.55–1.02)
DIFFERENTIAL METHOD BLD: ABNORMAL
EOSINOPHIL # BLD: 0 K/UL (ref 0–0.4)
EOSINOPHIL NFR BLD: 0 % (ref 0–7)
ERYTHROCYTE [DISTWIDTH] IN BLOOD BY AUTOMATED COUNT: 15 % (ref 11.5–14.5)
GLUCOSE BLD STRIP.AUTO-MCNC: 357 MG/DL (ref 65–100)
GLUCOSE BLD STRIP.AUTO-MCNC: 380 MG/DL (ref 65–100)
GLUCOSE SERPL-MCNC: 413 MG/DL (ref 65–100)
HCT VFR BLD AUTO: 41.7 % (ref 35–47)
HGB BLD-MCNC: 14 G/DL (ref 11.5–16)
IMM GRANULOCYTES # BLD AUTO: 0.1 K/UL (ref 0–0.04)
IMM GRANULOCYTES NFR BLD AUTO: 0 % (ref 0–0.5)
LYMPHOCYTES # BLD: 0.8 K/UL (ref 0.8–3.5)
LYMPHOCYTES NFR BLD: 6 % (ref 12–49)
MCH RBC QN AUTO: 28.7 PG (ref 26–34)
MCHC RBC AUTO-ENTMCNC: 33.6 G/DL (ref 30–36.5)
MCV RBC AUTO: 85.5 FL (ref 80–99)
MONOCYTES # BLD: 0.5 K/UL (ref 0–1)
MONOCYTES NFR BLD: 3 % (ref 5–13)
NEUTS SEG # BLD: 13 K/UL (ref 1.8–8)
NEUTS SEG NFR BLD: 91 % (ref 32–75)
NRBC # BLD: 0 K/UL (ref 0–0.01)
NRBC BLD-RTO: 0 PER 100 WBC
PERFORMED BY:: ABNORMAL
PERFORMED BY:: ABNORMAL
PLATELET # BLD AUTO: 308 K/UL (ref 150–400)
PMV BLD AUTO: 9.5 FL (ref 8.9–12.9)
POTASSIUM SERPL-SCNC: 3.5 MMOL/L (ref 3.5–5.1)
RAD ONC ARIA COURSE FIRST TREATMENT DATE: NORMAL
RAD ONC ARIA COURSE ID: NORMAL
RAD ONC ARIA COURSE INTENT: NORMAL
RAD ONC ARIA COURSE LAST TREATMENT DATE: NORMAL
RAD ONC ARIA COURSE SESSION NUMBER: 2
RAD ONC ARIA COURSE START DATE: NORMAL
RAD ONC ARIA COURSE TREATMENT ELAPSED DAYS: 1
RAD ONC ARIA PLAN FRACTIONS TREATED TO DATE: 2
RAD ONC ARIA PLAN ID: NORMAL
RAD ONC ARIA PLAN PRESCRIBED DOSE PER FRACTION: 2 GY
RAD ONC ARIA PLAN PRIMARY REFERENCE POINT: NORMAL
RAD ONC ARIA PLAN TOTAL FRACTIONS PRESCRIBED: 10
RAD ONC ARIA PLAN TOTAL PRESCRIBED DOSE: 2000 CGY
RAD ONC ARIA REFERENCE POINT DOSAGE GIVEN TO DATE: 4 GY
RAD ONC ARIA REFERENCE POINT ID: NORMAL
RAD ONC ARIA REFERENCE POINT SESSION DOSAGE GIVEN: 2 GY
RBC # BLD AUTO: 4.88 M/UL (ref 3.8–5.2)
SODIUM SERPL-SCNC: 135 MMOL/L (ref 136–145)
WBC # BLD AUTO: 14.3 K/UL (ref 3.6–11)

## 2024-01-19 PROCEDURE — 6370000000 HC RX 637 (ALT 250 FOR IP)

## 2024-01-19 PROCEDURE — 36415 COLL VENOUS BLD VENIPUNCTURE: CPT

## 2024-01-19 PROCEDURE — 82962 GLUCOSE BLOOD TEST: CPT

## 2024-01-19 PROCEDURE — 6360000002 HC RX W HCPCS: Performed by: INTERNAL MEDICINE

## 2024-01-19 PROCEDURE — 77412 RADIATION TX DELIVERY LVL 3: CPT

## 2024-01-19 PROCEDURE — 85025 COMPLETE CBC W/AUTO DIFF WBC: CPT

## 2024-01-19 PROCEDURE — 6370000000 HC RX 637 (ALT 250 FOR IP): Performed by: STUDENT IN AN ORGANIZED HEALTH CARE EDUCATION/TRAINING PROGRAM

## 2024-01-19 PROCEDURE — 6360000002 HC RX W HCPCS: Performed by: HOSPITALIST

## 2024-01-19 PROCEDURE — 80048 BASIC METABOLIC PNL TOTAL CA: CPT

## 2024-01-19 PROCEDURE — 6370000000 HC RX 637 (ALT 250 FOR IP): Performed by: INTERNAL MEDICINE

## 2024-01-19 RX ORDER — METHYLPREDNISOLONE 4 MG/1
TABLET ORAL
Qty: 1 KIT | Refills: 0 | Status: SHIPPED | OUTPATIENT
Start: 2024-01-19 | End: 2024-01-25

## 2024-01-19 RX ORDER — DEXAMETHASONE 4 MG/1
4 TABLET ORAL ONCE
Status: COMPLETED | OUTPATIENT
Start: 2024-01-19 | End: 2024-01-19

## 2024-01-19 RX ORDER — BLOOD-GLUCOSE METER
1 KIT MISCELLANEOUS DAILY
Qty: 1 KIT | Refills: 0 | Status: SHIPPED | OUTPATIENT
Start: 2024-01-19

## 2024-01-19 RX ADMIN — INSULIN LISPRO 8 UNITS: 100 INJECTION, SOLUTION INTRAVENOUS; SUBCUTANEOUS at 08:45

## 2024-01-19 RX ADMIN — AMLODIPINE BESYLATE 10 MG: 5 TABLET ORAL at 08:45

## 2024-01-19 RX ADMIN — ENOXAPARIN SODIUM 40 MG: 100 INJECTION SUBCUTANEOUS at 08:45

## 2024-01-19 RX ADMIN — INSULIN GLARGINE 15 UNITS: 100 INJECTION, SOLUTION SUBCUTANEOUS at 08:44

## 2024-01-19 RX ADMIN — GUAIFENESIN 600 MG: 600 TABLET, EXTENDED RELEASE ORAL at 08:45

## 2024-01-19 RX ADMIN — HYDROCHLOROTHIAZIDE 25 MG: 25 TABLET ORAL at 08:45

## 2024-01-19 RX ADMIN — DEXAMETHASONE 4 MG: 4 TABLET ORAL at 05:02

## 2024-01-19 NOTE — CARE COORDINATION
Chart reviewed.    DCP: Home with daughter. Daughter will provide transportation home.          Transition of Care Plan:    RUR: 13%  Prior Level of Functioning: Assist from family  Disposition: Home w/daughter  If SNF or IPR: Date FOC offered:   Date FOC received:   Accepting facility:   Date authorization started with reference number:   Date authorization received and expires:   Follow up appointments: Discharge summary  DME needed: Already owns necessary DME  Transportation at discharge: Daughter  IM/IMM Medicare/ letter given: N/A  Is patient a  and connected with VA? N/A   If yes, was  transfer form completed and VA notified?   Caregiver Contact: Per primary nurse  Discharge Caregiver contacted prior to discharge? Per primary nurse  Care Conference needed? N/A  Barriers to discharge: N/A

## 2024-01-19 NOTE — PLAN OF CARE
Problem: Discharge Planning  Goal: Discharge to home or other facility with appropriate resources  Outcome: Adequate for Discharge     Problem: Safety - Adult  Goal: Free from fall injury  Outcome: Adequate for Discharge     Problem: Skin/Tissue Integrity  Goal: Absence of new skin breakdown  Description: 1.  Monitor for areas of redness and/or skin breakdown  2.  Assess vascular access sites hourly  3.  Every 4-6 hours minimum:  Change oxygen saturation probe site  4.  Every 4-6 hours:  If on nasal continuous positive airway pressure, respiratory therapy assess nares and determine need for appliance change or resting period.  Outcome: Adequate for Discharge     Problem: Chronic Conditions and Co-morbidities  Goal: Patient's chronic conditions and co-morbidity symptoms are monitored and maintained or improved  Outcome: Adequate for Discharge

## 2024-01-19 NOTE — PROGRESS NOTES
Comprehensive Nutrition Assessment    Type and Reason for Visit:  Initial, RD Nutrition Re-Screen/LOS    Nutrition Recommendations/Plan:   Continue current diet order.   Pt may benefit from high Kcal/PRO ONS if PO intakes decline.   Obtain new weight.   Monitor and record PO intakes, Bms, and weights in I&Os.     Malnutrition Assessment:  Malnutrition Status:  Insufficient data (01/19/24 1055)        Nutrition Assessment:    Admitted for AMS and CT findings of multiple brain masses concerning for metastasis. Oncology following. Documented Pt started on radation therapy on 1/18 (total 10 treatments). Pt confused. Unable to answer interview questions. Per PCA sitter, Pt ate 100% of breakfast this AM. Unsure of admit weight method. Unable to assess weight hx. Recs above. Labs: Na 135, BUN 32, Cr 1.13, POC glucose 199-380 x 24 hrs, WBC 14.3. Meds, decadron, lovenox, haldol, SSI, melatonin.    Nutrition Related Findings:    No visual wasting noted. Last BM 1/17. Per PCA sitter, Pt  no chew/swallow difficulties observed. No edema. Wound Type: None       Current Nutrition Intake & Therapies:    Average Meal Intake: 51-75%, %  Average Supplements Intake: None Ordered  ADULT DIET; Regular    Anthropometric Measures:  Height: 167.6 cm (5' 5.98\")  Ideal Body Weight (IBW): 130 lbs (59 kg)    Current Body Weight: 86.2 kg (190 lb), 146.2 % IBW. Weight Source:  (unknown)  Current BMI (kg/m2): 30.7    Weight Adjustment For: No Adjustment     BMI Categories: Obese Class 1 (BMI 30.0-34.9)    Wt Readings from Last 3 Encounters:   01/12/24 86.2 kg (190 lb)   10/31/23 94.3 kg (208 lb)   02/17/23 95.1 kg (209 lb 10.5 oz)        Estimated Daily Nutrient Needs:  Energy Requirements Based On: Formula  Weight Used for Energy Requirements: Current  Energy (kcal/day): 2141 kcals/day (MSJx1.2x1.3-active CA treatment)  Weight Used for Protein Requirements: Current  Protein (g/day): 103-129 g/day (1.2-1.5 g/day)  Method Used for Fluid  Requirements: 1 ml/kcal  Fluid (ml/day): 2141 ml/day    Nutrition Diagnosis:   Increased nutrient needs related to increase demand for energy/nutrients as evidenced by other (comment) (active CA treatment)    Nutrition Interventions:   Food and/or Nutrient Delivery: Continue Current Diet  Nutrition Education/Counseling: No recommendation at this time  Coordination of Nutrition Care: Continue to monitor while inpatient  Plan of Care discussed with: Pt and PCA    Goals:     Goals: Meet at least 75% of estimated needs, within 7 days       Nutrition Monitoring and Evaluation:   Behavioral-Environmental Outcomes: None Identified  Food/Nutrient Intake Outcomes: Food and Nutrient Intake  Physical Signs/Symptoms Outcomes: Weight, Meal Time Behavior    Discharge Planning:    Too soon to determine     Brianna Santos RD  Contact: [phone]

## 2024-01-19 NOTE — PROGRESS NOTES
RN to bedside and per report from charge ABDIEL Ojeda pt discharged by provider, prior to this RN giving any paperwork.  No IV in place at shift change, and per staff, daughter transported home.  Manager Karen pastor.

## 2024-01-19 NOTE — DISCHARGE SUMMARY
Discharge Summary    Name: Patience Porras  [id number]  YOB: 1960 (Age: 63 y.o.)   Date of Admission: 1/12/2024  Date of Discharge: 1/19/2024  Attending Physician: Sedrick Olivia MD    Discharge Diagnosis:   Principal Problem:    Metastatic lung cancer (metastasis from lung to other site), left (HCC)  Active Problems:    Diabetes mellitus (HCC)    Hypertension    COPD (chronic obstructive pulmonary disease) (HCC)    Metastatic cancer to brain (HCC)    Altered mental status  Resolved Problems:    JOSEE (acute kidney injury) (HCC)       Consultations:  IP CONSULT TO PALLIATIVE CARE  IP CONSULT TO ONCOLOGY  IP CONSULT TO RADIATION ONCOLOGY      Brief Admission History/Reason for Admission Per Mitchell Bedolla Cha, MD:   Altered mental status; multiple brain masses    Brief Hospital Course by Main Problems:   Patience Porras is a 63 y.o. female with COPD, diabetes, hypertension and lung cancer who presented to the ED with chief complaint of altered mental status. No additional history obtained from patient due to poor mentation. No family at bedside. Reportedly EMS, called for constipation. However she did not report constipation or abdominal pain while in the ED.      CT head showed multiple brain masses concerning for metastasis, no acute hemorrhage. Patient admitted for further management. Oncology consult. Patient status post gamma knife currently on immunotherapy for maintenance. She is active with Neurosurgery at [facility] for CNS mets. Started on IV steroids given new altered mental status. Per oncology, will need MRI and XRT to plan next gamma knife. MRI of brain shows multiple peripheral enhancement intracranial metastasis measuring up to 3.4 x 2.7 cm in the anterior right frontal lobe and 2.2 x 2.5 cm in the left temporal lobe.  Associated with moderate to large vasogenic edema and mass effect with up to 3 mm left to right midline brain shift.  No infarct or

## 2024-01-21 NOTE — PROGRESS NOTES
Physician Progress Note      PATIENT:               FORD BALL  CSN #:                  [id number]  :                       1960  ADMIT DATE:       2024 7:24 PM  DISCH DATE:        2024 11:15 AM  RESPONDING  PROVIDER #:        Brianna Gayle          QUERY TEXT:    Pt admitted with brain mets with cerebral edema. Pt noted to have altered   mental status. If possible, please document in the progress notes and   discharge summary if you are evaluating and / or treating any of the   following:    The medical record reflects the following:  Risk Factors:  -63 F presents with altered mental status  -admitted with brain mets, cerebral edema    Clinical Indicators:  -MRI: Multiple peripheral enhancement intracranial metastasis measuring up to   3.4 x 2.7 cm in the anterior right frontal lobe and 2.2 x 2.5 cm in the left   temporal lobe. Associated moderate to large vasogenic edema and mass effect   with up to 3 mm left-to-right midline brain shift  -per Attending documentation, AMS likely r/t brain mets  -documentation notes patient as alert, confused  -per  nurses note, \" Patient refused her night medicine of mucinex,   melatonin, and accucheck for blood glucose. Patient allowed me to assess her   lung sounds and administer decadron iv with iv flush. Patient is alert to   person, and place. Disoriented to time and situation. The patient then became   agitated and started yelling\".    Treatment:  -labs, imaging  -HemOnc, RadOnc consults  -to start WBRT 24  -virtual sitter  -Haldol, Decadron    Thank you,  CATARINO Ashraf, RN, CCDS, CRCR  [email or web address]   [email or web address] or contact via [employer]  Options provided:  -- Encephalopathy due to brain mets with cerebral edema  -- Other - I will add my own diagnosis  -- Disagree - Not applicable / Not valid  -- Disagree - Clinically unable to determine / Unknown  -- Refer to Clinical Documentation

## 2024-01-22 ENCOUNTER — HOSPITAL ENCOUNTER (OUTPATIENT)
Facility: HOSPITAL | Age: 64
Discharge: HOME | End: 2024-01-25
Attending: RADIOLOGY
Payer: MEDICAID

## 2024-01-22 LAB
RAD ONC ARIA COURSE FIRST TREATMENT DATE: NORMAL
RAD ONC ARIA COURSE ID: NORMAL
RAD ONC ARIA COURSE INTENT: NORMAL
RAD ONC ARIA COURSE LAST TREATMENT DATE: NORMAL
RAD ONC ARIA COURSE SESSION NUMBER: 3
RAD ONC ARIA COURSE START DATE: NORMAL
RAD ONC ARIA COURSE TREATMENT ELAPSED DAYS: 4
RAD ONC ARIA PLAN FRACTIONS TREATED TO DATE: 3
RAD ONC ARIA PLAN ID: NORMAL
RAD ONC ARIA PLAN PRESCRIBED DOSE PER FRACTION: 2 GY
RAD ONC ARIA PLAN PRIMARY REFERENCE POINT: NORMAL
RAD ONC ARIA PLAN TOTAL FRACTIONS PRESCRIBED: 10
RAD ONC ARIA PLAN TOTAL PRESCRIBED DOSE: 2000 CGY
RAD ONC ARIA REFERENCE POINT DOSAGE GIVEN TO DATE: 6 GY
RAD ONC ARIA REFERENCE POINT ID: NORMAL
RAD ONC ARIA REFERENCE POINT SESSION DOSAGE GIVEN: 2 GY

## 2024-01-22 PROCEDURE — 77417 THER RADIOLOGY PORT IMAGE(S): CPT

## 2024-01-22 PROCEDURE — 77412 RADIATION TX DELIVERY LVL 3: CPT

## 2024-01-23 ENCOUNTER — HOSPITAL ENCOUNTER (OUTPATIENT)
Facility: HOSPITAL | Age: 64
Discharge: HOME | End: 2024-01-26
Attending: RADIOLOGY
Payer: MEDICAID

## 2024-01-23 LAB
RAD ONC ARIA COURSE FIRST TREATMENT DATE: NORMAL
RAD ONC ARIA COURSE ID: NORMAL
RAD ONC ARIA COURSE INTENT: NORMAL
RAD ONC ARIA COURSE LAST TREATMENT DATE: NORMAL
RAD ONC ARIA COURSE SESSION NUMBER: 4
RAD ONC ARIA COURSE START DATE: NORMAL
RAD ONC ARIA COURSE TREATMENT ELAPSED DAYS: 5
RAD ONC ARIA PLAN FRACTIONS TREATED TO DATE: 4
RAD ONC ARIA PLAN ID: NORMAL
RAD ONC ARIA PLAN PRESCRIBED DOSE PER FRACTION: 2 GY
RAD ONC ARIA PLAN PRIMARY REFERENCE POINT: NORMAL
RAD ONC ARIA PLAN TOTAL FRACTIONS PRESCRIBED: 10
RAD ONC ARIA PLAN TOTAL PRESCRIBED DOSE: 2000 CGY
RAD ONC ARIA REFERENCE POINT DOSAGE GIVEN TO DATE: 8 GY
RAD ONC ARIA REFERENCE POINT ID: NORMAL
RAD ONC ARIA REFERENCE POINT SESSION DOSAGE GIVEN: 2 GY

## 2024-01-23 PROCEDURE — 77412 RADIATION TX DELIVERY LVL 3: CPT

## 2024-01-24 ENCOUNTER — HOSPITAL ENCOUNTER (OUTPATIENT)
Facility: HOSPITAL | Age: 64
Discharge: HOME | End: 2024-01-27
Attending: RADIOLOGY
Payer: MEDICAID

## 2024-01-24 LAB
RAD ONC ARIA COURSE FIRST TREATMENT DATE: NORMAL
RAD ONC ARIA COURSE ID: NORMAL
RAD ONC ARIA COURSE INTENT: NORMAL
RAD ONC ARIA COURSE LAST TREATMENT DATE: NORMAL
RAD ONC ARIA COURSE SESSION NUMBER: 5
RAD ONC ARIA COURSE START DATE: NORMAL
RAD ONC ARIA COURSE TREATMENT ELAPSED DAYS: 6
RAD ONC ARIA PLAN FRACTIONS TREATED TO DATE: 5
RAD ONC ARIA PLAN ID: NORMAL
RAD ONC ARIA PLAN PRESCRIBED DOSE PER FRACTION: 2 GY
RAD ONC ARIA PLAN PRIMARY REFERENCE POINT: NORMAL
RAD ONC ARIA PLAN TOTAL FRACTIONS PRESCRIBED: 10
RAD ONC ARIA PLAN TOTAL PRESCRIBED DOSE: 2000 CGY
RAD ONC ARIA REFERENCE POINT DOSAGE GIVEN TO DATE: 10 GY
RAD ONC ARIA REFERENCE POINT ID: NORMAL
RAD ONC ARIA REFERENCE POINT SESSION DOSAGE GIVEN: 2 GY

## 2024-01-24 PROCEDURE — 77336 RADIATION PHYSICS CONSULT: CPT

## 2024-01-24 PROCEDURE — 77412 RADIATION TX DELIVERY LVL 3: CPT

## 2024-01-25 ENCOUNTER — HOSPITAL ENCOUNTER (OUTPATIENT)
Facility: HOSPITAL | Age: 64
End: 2024-01-25
Attending: RADIOLOGY
Payer: MEDICAID

## 2024-01-25 LAB
RAD ONC ARIA COURSE FIRST TREATMENT DATE: NORMAL
RAD ONC ARIA COURSE ID: NORMAL
RAD ONC ARIA COURSE INTENT: NORMAL
RAD ONC ARIA COURSE LAST TREATMENT DATE: NORMAL
RAD ONC ARIA COURSE SESSION NUMBER: 6
RAD ONC ARIA COURSE START DATE: NORMAL
RAD ONC ARIA COURSE TREATMENT ELAPSED DAYS: 7
RAD ONC ARIA PLAN FRACTIONS TREATED TO DATE: 6
RAD ONC ARIA PLAN ID: NORMAL
RAD ONC ARIA PLAN PRESCRIBED DOSE PER FRACTION: 2 GY
RAD ONC ARIA PLAN PRIMARY REFERENCE POINT: NORMAL
RAD ONC ARIA PLAN TOTAL FRACTIONS PRESCRIBED: 10
RAD ONC ARIA PLAN TOTAL PRESCRIBED DOSE: 2000 CGY
RAD ONC ARIA REFERENCE POINT DOSAGE GIVEN TO DATE: 12 GY
RAD ONC ARIA REFERENCE POINT ID: NORMAL
RAD ONC ARIA REFERENCE POINT SESSION DOSAGE GIVEN: 2 GY

## 2024-01-25 PROCEDURE — 77412 RADIATION TX DELIVERY LVL 3: CPT

## 2024-01-26 ENCOUNTER — HOSPITAL ENCOUNTER (OUTPATIENT)
Facility: HOSPITAL | Age: 64
End: 2024-01-26
Attending: RADIOLOGY
Payer: MEDICAID

## 2024-01-26 LAB
RAD ONC ARIA COURSE FIRST TREATMENT DATE: NORMAL
RAD ONC ARIA COURSE ID: NORMAL
RAD ONC ARIA COURSE INTENT: NORMAL
RAD ONC ARIA COURSE LAST TREATMENT DATE: NORMAL
RAD ONC ARIA COURSE SESSION NUMBER: 7
RAD ONC ARIA COURSE START DATE: NORMAL
RAD ONC ARIA COURSE TREATMENT ELAPSED DAYS: 8
RAD ONC ARIA PLAN FRACTIONS TREATED TO DATE: 7
RAD ONC ARIA PLAN ID: NORMAL
RAD ONC ARIA PLAN PRESCRIBED DOSE PER FRACTION: 2 GY
RAD ONC ARIA PLAN PRIMARY REFERENCE POINT: NORMAL
RAD ONC ARIA PLAN TOTAL FRACTIONS PRESCRIBED: 10
RAD ONC ARIA PLAN TOTAL PRESCRIBED DOSE: 2000 CGY
RAD ONC ARIA REFERENCE POINT DOSAGE GIVEN TO DATE: 14 GY
RAD ONC ARIA REFERENCE POINT ID: NORMAL
RAD ONC ARIA REFERENCE POINT SESSION DOSAGE GIVEN: 2 GY

## 2024-01-26 PROCEDURE — 77412 RADIATION TX DELIVERY LVL 3: CPT

## 2024-01-29 ENCOUNTER — APPOINTMENT (OUTPATIENT)
Facility: HOSPITAL | Age: 64
End: 2024-01-29
Attending: RADIOLOGY
Payer: MEDICAID

## 2024-01-29 LAB
RAD ONC ARIA COURSE FIRST TREATMENT DATE: NORMAL
RAD ONC ARIA COURSE ID: NORMAL
RAD ONC ARIA COURSE INTENT: NORMAL
RAD ONC ARIA COURSE LAST TREATMENT DATE: NORMAL
RAD ONC ARIA COURSE SESSION NUMBER: 8
RAD ONC ARIA COURSE START DATE: NORMAL
RAD ONC ARIA COURSE TREATMENT ELAPSED DAYS: 11
RAD ONC ARIA PLAN FRACTIONS TREATED TO DATE: 8
RAD ONC ARIA PLAN ID: NORMAL
RAD ONC ARIA PLAN PRESCRIBED DOSE PER FRACTION: 2 GY
RAD ONC ARIA PLAN PRIMARY REFERENCE POINT: NORMAL
RAD ONC ARIA PLAN TOTAL FRACTIONS PRESCRIBED: 10
RAD ONC ARIA PLAN TOTAL PRESCRIBED DOSE: 2000 CGY
RAD ONC ARIA REFERENCE POINT DOSAGE GIVEN TO DATE: 16 GY
RAD ONC ARIA REFERENCE POINT ID: NORMAL
RAD ONC ARIA REFERENCE POINT SESSION DOSAGE GIVEN: 2 GY

## 2024-01-29 PROCEDURE — 77412 RADIATION TX DELIVERY LVL 3: CPT

## 2024-01-29 PROCEDURE — 77417 THER RADIOLOGY PORT IMAGE(S): CPT

## 2024-01-30 ENCOUNTER — APPOINTMENT (OUTPATIENT)
Facility: HOSPITAL | Age: 64
End: 2024-01-30
Attending: RADIOLOGY
Payer: MEDICAID

## 2024-01-30 LAB
RAD ONC ARIA COURSE FIRST TREATMENT DATE: NORMAL
RAD ONC ARIA COURSE ID: NORMAL
RAD ONC ARIA COURSE INTENT: NORMAL
RAD ONC ARIA COURSE LAST TREATMENT DATE: NORMAL
RAD ONC ARIA COURSE SESSION NUMBER: 9
RAD ONC ARIA COURSE START DATE: NORMAL
RAD ONC ARIA COURSE TREATMENT ELAPSED DAYS: 12
RAD ONC ARIA PLAN FRACTIONS TREATED TO DATE: 9
RAD ONC ARIA PLAN ID: NORMAL
RAD ONC ARIA PLAN PRESCRIBED DOSE PER FRACTION: 2 GY
RAD ONC ARIA PLAN PRIMARY REFERENCE POINT: NORMAL
RAD ONC ARIA PLAN TOTAL FRACTIONS PRESCRIBED: 10
RAD ONC ARIA PLAN TOTAL PRESCRIBED DOSE: 2000 CGY
RAD ONC ARIA REFERENCE POINT DOSAGE GIVEN TO DATE: 18 GY
RAD ONC ARIA REFERENCE POINT ID: NORMAL
RAD ONC ARIA REFERENCE POINT SESSION DOSAGE GIVEN: 2 GY

## 2024-01-30 PROCEDURE — 77412 RADIATION TX DELIVERY LVL 3: CPT

## 2024-01-31 ENCOUNTER — APPOINTMENT (OUTPATIENT)
Facility: HOSPITAL | Age: 64
End: 2024-01-31
Attending: RADIOLOGY
Payer: MEDICAID

## 2024-01-31 LAB
RAD ONC ARIA COURSE FIRST TREATMENT DATE: NORMAL
RAD ONC ARIA COURSE ID: NORMAL
RAD ONC ARIA COURSE INTENT: NORMAL
RAD ONC ARIA COURSE LAST TREATMENT DATE: NORMAL
RAD ONC ARIA COURSE SESSION NUMBER: 10
RAD ONC ARIA COURSE START DATE: NORMAL
RAD ONC ARIA COURSE TREATMENT ELAPSED DAYS: 13
RAD ONC ARIA PLAN FRACTIONS TREATED TO DATE: 10
RAD ONC ARIA PLAN ID: NORMAL
RAD ONC ARIA PLAN PRESCRIBED DOSE PER FRACTION: 2 GY
RAD ONC ARIA PLAN PRIMARY REFERENCE POINT: NORMAL
RAD ONC ARIA PLAN TOTAL FRACTIONS PRESCRIBED: 10
RAD ONC ARIA PLAN TOTAL PRESCRIBED DOSE: 2000 CGY
RAD ONC ARIA REFERENCE POINT DOSAGE GIVEN TO DATE: 20 GY
RAD ONC ARIA REFERENCE POINT ID: NORMAL
RAD ONC ARIA REFERENCE POINT SESSION DOSAGE GIVEN: 2 GY

## 2024-01-31 PROCEDURE — 77336 RADIATION PHYSICS CONSULT: CPT

## 2024-01-31 PROCEDURE — 77412 RADIATION TX DELIVERY LVL 3: CPT

## 2024-02-28 ENCOUNTER — HOSPITAL ENCOUNTER (OUTPATIENT)
Facility: HOSPITAL | Age: 64
Discharge: HOME | End: 2024-03-02
Attending: RADIOLOGY

## 2024-02-28 VITALS
DIASTOLIC BLOOD PRESSURE: 73 MMHG | SYSTOLIC BLOOD PRESSURE: 105 MMHG | TEMPERATURE: 98.5 F | BODY MASS INDEX: 29.1 KG/M2 | WEIGHT: 180.2 LBS | HEART RATE: 77 BPM | OXYGEN SATURATION: 95 % | RESPIRATION RATE: 18 BRPM

## 2024-02-28 DIAGNOSIS — C79.31 SECONDARY CANCER OF BRAIN (HCC): Primary | ICD-10-CM

## 2024-02-28 ASSESSMENT — PAIN SCALES - GENERAL: PAINLEVEL_OUTOF10: 0

## 2024-02-28 NOTE — PROGRESS NOTES
[facility]    Radiation Oncology Follow Up    Patience Porras  [id number]  1960     Diagnosis   Metastatic lung cancer. Brain mets    AJCC Staging has been reviewed.  Oncologic History   Thoracic  chemo/xrt 5/2023, whole brain xrt 1/2024    Interval History   Ms. Porras arrives today for routine follow up 1 month  from end of treatment.  She completed her whole brain radiotherapy 1 month ago.   Since then, she has been doing well. Energy has returned.   No neuro symptoms.    Sees Dr. Barrett next month        Review of Systems:  A complete review of systems was obtained and negative except as described above.    Interval Imaging/Labs     Above imaging/lab reports were reviewed.  Allergies and Medications     Allergies   Allergen Reactions    Lisinopril      Other reaction(s): Unknown (comments)    Seroquel [Quetiapine] Palpitations       Current Outpatient Medications   Medication Instructions    albuterol sulfate HFA (PROVENTIL;VENTOLIN;PROAIR) 108 (90 Base) MCG/ACT inhaler 2 puffs, Inhalation, EVERY 4 HOURS PRN    amLODIPine (NORVASC) 10 mg, Oral, DAILY    glucose monitoring kit 1 kit, Does not apply, DAILY    hydroCHLOROthiazide (HYDRODIURIL) 25 mg, Oral, DAILY    ipratropium-albuterol (DUONEB) 0.5-2.5 (3) MG/3ML SOLN nebulizer solution 3 mLs, Inhalation, EVERY 6 HOURS PRN        Physical Exam:   Vitals: There were no vitals taken for this visit.   Performance Status: ECOG 1, No physically strenuous activity, but ambulatory and able to carry out light or sedentary work (eg office work, light house work)  Constitutional: Pleasant, sitting comfortably in no acute distress.   HEENT: Moist mucous membranes.  Cardiac: Good peripheral perfusion, no upper or lower extremity edema bilaterally.  Pulmonary: No increased work of breathing. Symmetric chest rise  Skin: Warm, dry, no rashes noted.  Neurologic: Alert and oriented.CN 2-12 intact. Gait wnl.    Psychiatric:

## 2024-03-30 ENCOUNTER — HOSPITAL ENCOUNTER (EMERGENCY)
Facility: HOSPITAL | Age: 64
Discharge: HOME | End: 2024-03-30
Attending: STUDENT IN AN ORGANIZED HEALTH CARE EDUCATION/TRAINING PROGRAM
Payer: MEDICAID

## 2024-03-30 ENCOUNTER — APPOINTMENT (OUTPATIENT)
Facility: HOSPITAL | Age: 64
End: 2024-03-30
Payer: MEDICAID

## 2024-03-30 VITALS
SYSTOLIC BLOOD PRESSURE: 134 MMHG | TEMPERATURE: 98.6 F | HEIGHT: 65 IN | DIASTOLIC BLOOD PRESSURE: 64 MMHG | OXYGEN SATURATION: 96 % | HEART RATE: 72 BPM | BODY MASS INDEX: 30.71 KG/M2 | WEIGHT: 184.3 LBS | RESPIRATION RATE: 21 BRPM

## 2024-03-30 DIAGNOSIS — C34.92 METASTATIC LUNG CANCER (METASTASIS FROM LUNG TO OTHER SITE), LEFT (HCC): ICD-10-CM

## 2024-03-30 DIAGNOSIS — R56.9 SEIZURE (HCC): Primary | ICD-10-CM

## 2024-03-30 LAB
ALBUMIN SERPL-MCNC: 2.9 G/DL (ref 3.5–5)
ALBUMIN/GLOB SERPL: 0.7 (ref 1.1–2.2)
ALP SERPL-CCNC: 222 U/L (ref 45–117)
ALT SERPL-CCNC: 28 U/L (ref 12–78)
ANION GAP SERPL CALC-SCNC: 15 MMOL/L (ref 5–15)
AST SERPL-CCNC: 32 U/L (ref 15–37)
BASOPHILS # BLD: 0 K/UL (ref 0–0.1)
BASOPHILS NFR BLD: 0 % (ref 0–1)
BILIRUB SERPL-MCNC: 0.4 MG/DL (ref 0.2–1)
BUN SERPL-MCNC: 14 MG/DL (ref 6–20)
BUN/CREAT SERPL: 12 (ref 12–20)
CALCIUM SERPL-MCNC: 8.8 MG/DL (ref 8.5–10.1)
CHLORIDE SERPL-SCNC: 107 MMOL/L (ref 97–108)
CO2 SERPL-SCNC: 18 MMOL/L (ref 21–32)
COMMENT:: NORMAL
CREAT SERPL-MCNC: 1.14 MG/DL (ref 0.55–1.02)
DIFFERENTIAL METHOD BLD: ABNORMAL
EOSINOPHIL # BLD: 0.1 K/UL (ref 0–0.4)
EOSINOPHIL NFR BLD: 1 % (ref 0–7)
ERYTHROCYTE [DISTWIDTH] IN BLOOD BY AUTOMATED COUNT: 16.1 % (ref 11.5–14.5)
GLOBULIN SER CALC-MCNC: 4.4 G/DL (ref 2–4)
GLUCOSE BLD STRIP.AUTO-MCNC: 219 MG/DL (ref 65–117)
GLUCOSE SERPL-MCNC: 230 MG/DL (ref 65–100)
HCT VFR BLD AUTO: 33.6 % (ref 35–47)
HGB BLD-MCNC: 10.6 G/DL (ref 11.5–16)
IMM GRANULOCYTES # BLD AUTO: 0.1 K/UL (ref 0–0.04)
IMM GRANULOCYTES NFR BLD AUTO: 1 % (ref 0–0.5)
INR PPP: 1 (ref 0.9–1.1)
LYMPHOCYTES # BLD: 1 K/UL (ref 0.8–3.5)
LYMPHOCYTES NFR BLD: 10 % (ref 12–49)
MAGNESIUM SERPL-MCNC: 1.9 MG/DL (ref 1.6–2.4)
MCH RBC QN AUTO: 30.4 PG (ref 26–34)
MCHC RBC AUTO-ENTMCNC: 31.5 G/DL (ref 30–36.5)
MCV RBC AUTO: 96.3 FL (ref 80–99)
MONOCYTES # BLD: 1 K/UL (ref 0–1)
MONOCYTES NFR BLD: 10 % (ref 5–13)
NEUTS SEG # BLD: 8.3 K/UL (ref 1.8–8)
NEUTS SEG NFR BLD: 78 % (ref 32–75)
NRBC # BLD: 0 K/UL (ref 0–0.01)
NRBC BLD-RTO: 0 PER 100 WBC
PLATELET # BLD AUTO: 230 K/UL (ref 150–400)
PMV BLD AUTO: 9.4 FL (ref 8.9–12.9)
POTASSIUM SERPL-SCNC: 3.2 MMOL/L (ref 3.5–5.1)
PROT SERPL-MCNC: 7.3 G/DL (ref 6.4–8.2)
PROTHROMBIN TIME: 10.4 SEC (ref 9–11.1)
RBC # BLD AUTO: 3.49 M/UL (ref 3.8–5.2)
SERVICE CMNT-IMP: ABNORMAL
SODIUM SERPL-SCNC: 140 MMOL/L (ref 136–145)
SPECIMEN HOLD: NORMAL
TROPONIN I SERPL HS-MCNC: 32 NG/L (ref 0–51)
WBC # BLD AUTO: 10.6 K/UL (ref 3.6–11)

## 2024-03-30 PROCEDURE — 70498 CT ANGIOGRAPHY NECK: CPT

## 2024-03-30 PROCEDURE — 83735 ASSAY OF MAGNESIUM: CPT

## 2024-03-30 PROCEDURE — 96374 THER/PROPH/DIAG INJ IV PUSH: CPT

## 2024-03-30 PROCEDURE — 85610 PROTHROMBIN TIME: CPT

## 2024-03-30 PROCEDURE — 84484 ASSAY OF TROPONIN QUANT: CPT

## 2024-03-30 PROCEDURE — 6360000002 HC RX W HCPCS: Performed by: STUDENT IN AN ORGANIZED HEALTH CARE EDUCATION/TRAINING PROGRAM

## 2024-03-30 PROCEDURE — 70450 CT HEAD/BRAIN W/O DYE: CPT

## 2024-03-30 PROCEDURE — 0042T CT BRAIN PERFUSION: CPT

## 2024-03-30 PROCEDURE — 36415 COLL VENOUS BLD VENIPUNCTURE: CPT

## 2024-03-30 PROCEDURE — 99285 EMERGENCY DEPT VISIT HI MDM: CPT

## 2024-03-30 PROCEDURE — 6360000004 HC RX CONTRAST MEDICATION: Performed by: STUDENT IN AN ORGANIZED HEALTH CARE EDUCATION/TRAINING PROGRAM

## 2024-03-30 PROCEDURE — 85025 COMPLETE CBC W/AUTO DIFF WBC: CPT

## 2024-03-30 PROCEDURE — 82962 GLUCOSE BLOOD TEST: CPT

## 2024-03-30 PROCEDURE — 80053 COMPREHEN METABOLIC PANEL: CPT

## 2024-03-30 RX ORDER — DEXAMETHASONE SODIUM PHOSPHATE 10 MG/ML
10 INJECTION, SOLUTION INTRAMUSCULAR; INTRAVENOUS ONCE
Status: COMPLETED | OUTPATIENT
Start: 2024-03-30 | End: 2024-03-30

## 2024-03-30 RX ORDER — LEVETIRACETAM 500 MG/5ML
1500 INJECTION, SOLUTION, CONCENTRATE INTRAVENOUS ONCE
Status: DISCONTINUED | OUTPATIENT
Start: 2024-03-30 | End: 2024-03-30

## 2024-03-30 RX ORDER — OXYCODONE HYDROCHLORIDE AND ACETAMINOPHEN 5; 325 MG/1; MG/1
1 TABLET ORAL EVERY 8 HOURS PRN
Qty: 9 TABLET | Refills: 0 | Status: SHIPPED | OUTPATIENT
Start: 2024-03-30 | End: 2024-04-02

## 2024-03-30 RX ADMIN — IOPAMIDOL 100 ML: 755 INJECTION, SOLUTION INTRAVENOUS at 20:12

## 2024-03-30 RX ADMIN — DEXAMETHASONE SODIUM PHOSPHATE 10 MG: 10 INJECTION, SOLUTION INTRAMUSCULAR; INTRAVENOUS at 20:13

## 2024-03-30 NOTE — ED NOTES
1948: Code stroke level 2 called at this time-patient neglecting right side    2032: Patient emergency contact called at this time. Daughter stated that her moms LNK was 1600 3/30. She found her mom on her knees attempting to go to the bathroom. Daughter states that her mom is in the process of being put on hospice and states that she would like no care to be given other then comfort. MD Marion pastor MD to contact Daughter for care plan.    2040: Per MD Schultz, patient to be discharged no further care to be provided.    2100: H2H to  patient at 2230, Daughter is aware

## 2024-03-31 LAB
EKG ATRIAL RATE: 93 BPM
EKG DIAGNOSIS: NORMAL
EKG P AXIS: 68 DEGREES
EKG P-R INTERVAL: 136 MS
EKG Q-T INTERVAL: 408 MS
EKG QRS DURATION: 80 MS
EKG QTC CALCULATION (BAZETT): 507 MS
EKG R AXIS: -23 DEGREES
EKG T AXIS: 61 DEGREES
EKG VENTRICULAR RATE: 93 BPM

## 2024-03-31 NOTE — ED PROVIDER NOTES
EMERGENCY DEPARTMENT HISTORY AND PHYSICAL EXAM      Date: 3/30/2024  Patient Name: Patience Porras    History of Presenting Illness     Chief Complaint   Patient presents with    Seizures     Patient presents via EMS with cc of weakness, patient was ambulatory all day got home and became weak. EMS arrived and patient began to have tonic clonic like seizure EMS administered 5 mg of Versed. Patient is now in a post ictal state. Patient on 2L NC at this time. No hx of seizures. Non-verbal baseline         HPI: History From: EMS, family , History limited by: Altered mental status, nonverbal  Patience Porras, 63 y.o. female presents to the ED with cc of seizure.  Per EMS, she was initially having \"spasm\" of her right leg.  She is otherwise alert and appropriate on their arrival.  And route, she had a 2-1/2-minute generalized tonic-clonic seizure.  She was given Versed with subsequent resolution of seizure-like activity.  Patient is alert but postictal appearing, not cooperative with questioning.  Per family, she was found trying to go to the bathroom, had a fall onto her knees.  She has a known history of metastatic lung cancer, with metastases to the brain, last got chemotherapy a month ago.        There are no other complaints, changes, or physical findings at this time.    PCP: No primary care provider on file.    No current facility-administered medications on file prior to encounter.     Current Outpatient Medications on File Prior to Encounter   Medication Sig Dispense Refill    glucose monitoring kit 1 kit by Does not apply route daily 1 kit 0    albuterol sulfate HFA (PROVENTIL;VENTOLIN;PROAIR) 108 (90 Base) MCG/ACT inhaler Inhale 2 puffs into the lungs every 4 hours as needed for Wheezing or Shortness of Breath 1 each 0    amLODIPine (NORVASC) 10 MG tablet Take 1 tablet by mouth daily      hydroCHLOROthiazide (HYDRODIURIL) 25 MG tablet Take 1 tablet by mouth daily      ipratropium-albuterol (DUONEB) 0.5-2.5